# Patient Record
Sex: FEMALE | Race: WHITE | NOT HISPANIC OR LATINO | Employment: FULL TIME | URBAN - METROPOLITAN AREA
[De-identification: names, ages, dates, MRNs, and addresses within clinical notes are randomized per-mention and may not be internally consistent; named-entity substitution may affect disease eponyms.]

---

## 2023-11-27 ENCOUNTER — OFFICE VISIT (OUTPATIENT)
Dept: NEUROLOGY | Facility: CLINIC | Age: 38
End: 2023-11-27
Payer: COMMERCIAL

## 2023-11-27 VITALS
TEMPERATURE: 99.5 F | OXYGEN SATURATION: 97 % | SYSTOLIC BLOOD PRESSURE: 122 MMHG | WEIGHT: 197.1 LBS | HEART RATE: 100 BPM | BODY MASS INDEX: 29.11 KG/M2 | DIASTOLIC BLOOD PRESSURE: 76 MMHG

## 2023-11-27 DIAGNOSIS — Z3A.01 LESS THAN 8 WEEKS GESTATION OF PREGNANCY: ICD-10-CM

## 2023-11-27 DIAGNOSIS — G43.109 MIGRAINE WITH AURA AND WITHOUT STATUS MIGRAINOSUS, NOT INTRACTABLE: Primary | ICD-10-CM

## 2023-11-27 PROCEDURE — 99213 OFFICE O/P EST LOW 20 MIN: CPT

## 2023-11-27 NOTE — PROGRESS NOTES
[facility]  PATIENT:  Diego Vieira  MRN:  [id number]  :  1985  DATE OF SERVICE:  2023      Assessment/Plan:     Migraine with aura and without status migrainosus in pregnancy:    I had the pleasure of seeing Harjinder Wilhelm today in the office at [facility] and by phone. She is presenting for an office visit follow-up in regard to her migraine headaches. Patient had been previously followed by Dr. Mdayson Yañez in regards to treating her migraine headaches. At the last visit, there was some discussion regards to the patient becoming pregnant. Patient reports that at today's visit she is about 7 weeks gestation into her pregnancy. She reports that she had stopped taking all of her medications including naproxen and rizatriptan. She still continues to take a prenatal vitamin that has vitamin B2 in it, she also continues magnesium supplementation as well outside of this. Encouraged the patient that it is perfectly fine to continue with magnesium and B2 supplementation at this time. Patient states however she has only had 2 significant migraines since her last appointment. She notes that the naproxen was helping more so at relieving the headaches and the rizatriptan, however she is not able to take the naproxen now of course during pregnancy. Had advised her that she could just take about a 1000 mg of Tylenol she did have a significant migraine headache and see how that helps to lemonade. If she is having difficulty with her migraines or she has any increase in regard to her migraine frequency in the future, she can certainly reach out and let us know. Would be happy to assist her with any additional abortive or preventative medication if need be. At this time, patient should just continue with her supplementation and using Tylenol if needed and we will see how she does over the course of the next few months.   Plan is to follow-up with Dr. Madyson Yañez in about 6 months time. Patient Instructions:    Headache Calendar  Please maintain a headache calendar  Consider using phone applications such as Migraine Rafael or Migraine Diary    Headache/migraine treatment:     Rescue medications (for immediate treatment of a headache):     - Okay to take Tylenol 1000 mg at the onset of migraine if needed. Can repeat every 6 hours as need be for a migraine headache. - Avoid NSAIDs right now during pregnancy. - Continue current prenatal regimen and following with OB/GYN for pregnancy at this time. Over the counter preventive supplements for headaches/migraines (if you try, try for 3 months straight)  (to take every day to help prevent headaches - not to take at the time of headache): There are combo pills online of these - none of which regulated by FDA and double check dosing - take appropriate dose only once a day- prevent a migraine, migravent, mind ease, migrelief   [x] Magnesium 400mg daily (If any diarrhea or upset stomach, decrease dose  as tolerated)  [x] Riboflavin (Vitamin B2) 400mg daily (may make your urine bright/neon yellow)    Lifestyle Recommendations:  [x] SLEEP - Maintain a regular sleep schedule: Adults need at least 7-8 hours of uninterrupted a night. Maintain good sleep hygiene:  Going to bed and waking up at consistent times, avoiding excessive daytime naps, avoiding caffeinated beverages in the evening, avoid excessive stimulation in the evening and generally using bed primarily for sleeping. One hour before bedtime would recommend turning lights down lower, decreasing your activity (may read quietly, listen to music at a low volume). When you get into bed, should eliminate all technology (no texting, emailing, playing with your phone, iPad or tablet in bed). [x] HYDRATION - Maintain good hydration. Drink  2L of fluid a day (4 typical small water bottles)  [x] DIET - Maintain good nutrition.  In particular don't skip meals and try and eat healthy balanced meals regularly. [x] TRIGGERS - Look for other triggers and avoid them: Limit caffeine to 1-2 cups a day or less. Avoid dietary triggers that you have noticed bring on your headaches (this could include aged cheese, peanuts, MSG, aspartame and nitrates). [x] EXERCISE - physical exercise as we all know is good for you in many ways, and not only is good for your heart, but also is beneficial for your mental health, cognitive health and  chronic pain/headaches. I would encourage at the least 5 days of physical exercise weekly for at least 30 minutes. Education and Follow-up  [x] Please call with any questions or concerns. Of course if any new concerning symptoms go to the emergency department. [x] Follow up with 6 months with Dr. Romie Anderson      History of Present Illness: For Review: We had the pleasure of evaluating Amarjit Mitchell in neurological follow up  today for headaches. As you know,  she is a 30 y.o.   female with a past history of migraine headaches. Patient was last seen in the office at [facility] on 05/31/2020 by Dr. Romie Anderson. When the patient was last seen in the office, noted that she was doing exceptionally well with magnesium and B2 supplementation. Reporting that the frequency and intensity of her headaches had decreased. Patient was either using naproxen or rizatriptan at the time to help eliminate her headaches which seem to be working. It was noted at the last visit that the patient was planning on pregnancy after getting  in September. She had been recommended to provide us with any updates in regards to this in the future. There is recommended if she did become pregnant that she should talk with her OB/GYN about future treatment options if needed. At this time, still recommended to continue with naproxen and rizatriptan management for abortive therapy of her headaches.   Also recommended to continue with magnesium and B2 supplementation as well.        Current medical illnesses: migraines and anxiety       What medications do you take or have you taken for your headaches? Current Preventive:   Mg and B2  Current Abortive:   Maxalt 10 mg,     Prior Preventive:   Aimovig (stopped due to planning for pregnancy), Topamax (did not help), Candesartan (light headed), Gabapentin (worked initially, but then stopped)   Prior Abortive:   Compazine (did not help)     Interval updates as of 11/27/2023:    Still taking magnesium and prenatals have B2 in them. Maxalt did not work the greatest, was using Naproxen which was working but not taking now. Not taking Maxalt either with pregnancy. Recommended for the time being we stop taking Naproxen and Maxalt and just continue with Tylenol for abortive therapy if need be. 7 weeks gestation at this point in time. How often do the headaches occur? 2 migraines in total since last visit   Are you ever headache free? yes    What time of the day do the headaches start? Morning time usually     How long do the headaches last?   Usually a half an hour with naproxen to get rid of the migraine. Describe your usual headache ? Stabbing     Where is your headache located? Either temple on either side with additional neck pain     What is the intensity of pain? Average: 6 or 7/10     Associated symptoms:   Photophobia, phonophobia  Problem with concentration  light-headed or dizzy, stiff or sore neck,   prefer to be alone and in a dark room    Headache history with updates:  Headache are worse if the patient: bending over would make worse     Any positional change headaches? No positional change headaches     Headache triggers: weather changes, lack of sleep, dietary changes, lack of exercise     Aura/warning and how long does it last ? Yes, one aura in the past. Geometric patterns out of her peripheral vision. Was about two years ago when she had one. 15-30 minutes before pain came on.      What time of the year do headaches occur more frequently? are usually worse in the spring    Have you seen someone else for headaches or pain? Yes, had seen many neurologists in [place] and went to [place] in [facility]. Are you current pregnant or planning on getting pregnant? Yes    Have you ever had any Brain imaging? Yes, had been many years ago in the past.     Reviewed old notes from physician seen in the past- see above HPI for summary of previous encounters. Past Medical History:   Diagnosis Date    Cluster headache August 2009    Headache, tension-type August 2005    Migraine August 2009       Patient Active Problem List   Diagnosis    Migraine with aura and without status migrainosus, not intractable       Medications:      Current Outpatient Medications   Medication Sig Dispense Refill    Ascorbic Acid (vitamin C) 100 MG tablet Take 100 mg by mouth daily      Drospirenone 4 MG TABS Take 1 tablet by mouth in the morning (Patient not taking: Reported on 4/12/2023) 28 tablet 11    MAGNESIUM PO Take by mouth      naproxen (NAPROSYN) 500 mg tablet Take 1 tablet (500 mg total) by mouth daily as needed (migraine) 15 tablet 2    Riboflavin (VITAMIN B2 PO) Take by mouth      rizatriptan (MAXALT-MLT) 10 mg disintegrating tablet Take 1 tablet (10 mg total) by mouth once as needed for migraine for up to 1 dose May repeat in 2 hours if needed 10 tablet 3    sertraline (ZOLOFT) 50 mg tablet Take 1.5 tabs po daily for a total of 75 mg daily; avoid regular use of nsaids while taking 135 tablet 0     No current facility-administered medications for this visit.         Allergies:    No Known Allergies    Family History:     Family History   Problem Relation Age of Onset    Migraines Mother     Asthma Mother     Diabetes Mother     Dementia Maternal Grandmother     Migraines Maternal Grandmother     Cancer Father         prostate    Diabetes Father        Social History:     Social History     Socioeconomic History    Marital status: /Civil [employer]     Spouse name: Not on file    Number of children: Not on file    Years of education: Not on file    Highest education level: Not on file   Occupational History    Not on file   Tobacco Use    Smoking status: Never    Smokeless tobacco: Never   Vaping Use    Vaping Use: Never used   Substance and Sexual Activity    Alcohol use: Yes     Alcohol/week: 2.0 standard drinks of alcohol     Types: 2 Cans of beer per week     Comment: socially    Drug use: Yes     Types: Marijuana    Sexual activity: Yes     Partners: Male     Comment: birth control   Other Topics Concern    Not on file   Social History Narrative    Not on file     Social Determinants of Health     Financial Resource Strain: Not on file   Food Insecurity: Not on file   Transportation Needs: Not on file   Physical Activity: Not on file   Stress: Not on file   Social Connections: Not on file   Intimate Partner Violence: Not on file   Housing Stability: Not on file         Objective:     Physical Exam:                                                                 Vitals:            Constitutional:    There were no vitals taken for this visit. BP Readings from Last 3 Encounters:   06/07/23 119/74   05/31/23 116/78   04/12/23 120/84     Pulse Readings from Last 3 Encounters:   06/07/23 86   05/31/23 79   04/12/23 84         Well developed, well nourished, well groomed. No dysmorphic features. Psychiatric:  Normal behavior and appropriate affect        Neurological Examination:     Mental status/cognitive function:   Orientated to time, place and person. Recent and remote memory intact. Attention span and concentration as well as fund of knowledge are appropriate for age. Normal language and spontaneous speech. Cranial Nerves:  II-visual fields full. III, IV, VI-Pupils were equal, round, and reactive to light and accomodation. Extraocular movements were full and conjugate without nystagmus.  Conjugate gaze, normal smooth pursuits, normal saccades   V-facial sensation symmetric. VII-facial expression symmetric, intact forehead wrinkle, strong eye closure, symmetric smile    VIII-hearing grossly intact bilaterally   IX, X-palate elevation symmetric, no dysarthria. XI-shoulder shrug strength intact    XII-tongue protrusion midline. Motor Exam: symmetric bulk and tone throughout, no pronator drift. Power/strength 5/5 bilateral upper and lower extremities, no atrophy, fasciculations or abnormal movements noted. Sensory: grossly intact light touch in all extremities. Reflexes: brachioradialis 2+, biceps 2+, knee 2+ bilaterally  Coordination: Finger nose finger intact bilaterally, no apparent dysmetria, ataxia or tremor noted  Gait: steady casual and tandem gait. Review of Systems:     Review of Systems   Constitutional:  Negative for appetite change, fatigue and fever. HENT: Negative. Negative for hearing loss, tinnitus, trouble swallowing and voice change. Eyes: Negative. Negative for photophobia, pain and visual disturbance. Respiratory: Negative. Negative for shortness of breath. Cardiovascular: Negative. Negative for palpitations. Gastrointestinal: Negative. Negative for nausea and vomiting. Endocrine: Negative. Negative for cold intolerance. Genitourinary: Negative. Negative for dysuria, frequency and urgency. Musculoskeletal:  Negative for back pain, gait problem, myalgias and neck pain. Skin: Negative. Negative for rash. Allergic/Immunologic: Negative. Neurological: Negative. Negative for dizziness, tremors, seizures, syncope, facial asymmetry, speech difficulty, weakness, light-headedness, numbness and headaches. Hematological: Negative. Does not bruise/bleed easily. Psychiatric/Behavioral: Negative. Negative for confusion, hallucinations and sleep disturbance. All other systems reviewed and are negative.       I personally reviewed the ROS entered by the MA    I spent 20 minutes in total time for this visit.     Author:  Tanvir Ortiz PA-C 11/27/2023 7:35 AM

## 2023-11-27 NOTE — PATIENT INSTRUCTIONS
Patient Instructions:    Headache Calendar  Please maintain a headache calendar  Consider using phone applications such as Migraine Rafael or Migraine Diary    Headache/migraine treatment:     Rescue medications (for immediate treatment of a headache):     - Okay to take Tylenol 1000 mg at the onset of migraine if needed. Can repeat every 6 hours as need be for a migraine headache. - Avoid NSAIDs right now during pregnancy. - Continue current prenatal regimen and following with OB/GYN for pregnancy at this time. Over the counter preventive supplements for headaches/migraines (if you try, try for 3 months straight)  (to take every day to help prevent headaches - not to take at the time of headache): There are combo pills online of these - none of which regulated by FDA and double check dosing - take appropriate dose only once a day- prevent a migraine, migravent, mind ease, migrelief   [x] Magnesium 400mg daily (If any diarrhea or upset stomach, decrease dose  as tolerated)  [x] Riboflavin (Vitamin B2) 400mg daily (may make your urine bright/neon yellow)    Lifestyle Recommendations:  [x] SLEEP - Maintain a regular sleep schedule: Adults need at least 7-8 hours of uninterrupted a night. Maintain good sleep hygiene:  Going to bed and waking up at consistent times, avoiding excessive daytime naps, avoiding caffeinated beverages in the evening, avoid excessive stimulation in the evening and generally using bed primarily for sleeping. One hour before bedtime would recommend turning lights down lower, decreasing your activity (may read quietly, listen to music at a low volume). When you get into bed, should eliminate all technology (no texting, emailing, playing with your phone, iPad or tablet in bed). [x] HYDRATION - Maintain good hydration. Drink  2L of fluid a day (4 typical small water bottles)  [x] DIET - Maintain good nutrition.  In particular don't skip meals and try and eat healthy balanced meals regularly. [x] TRIGGERS - Look for other triggers and avoid them: Limit caffeine to 1-2 cups a day or less. Avoid dietary triggers that you have noticed bring on your headaches (this could include aged cheese, peanuts, MSG, aspartame and nitrates). [x] EXERCISE - physical exercise as we all know is good for you in many ways, and not only is good for your heart, but also is beneficial for your mental health, cognitive health and  chronic pain/headaches. I would encourage at the least 5 days of physical exercise weekly for at least 30 minutes. Education and Follow-up  [x] Please call with any questions or concerns. Of course if any new concerning symptoms go to the emergency department.   [x] Follow up with 6 months with Dr. Adia Garcia

## 2023-12-07 ENCOUNTER — ULTRASOUND (OUTPATIENT)
Dept: OBGYN CLINIC | Facility: CLINIC | Age: 38
End: 2023-12-07
Payer: COMMERCIAL

## 2023-12-07 ENCOUNTER — TELEPHONE (OUTPATIENT)
Dept: OBGYN CLINIC | Facility: CLINIC | Age: 38
End: 2023-12-07

## 2023-12-07 DIAGNOSIS — N91.1 SECONDARY AMENORRHEA: ICD-10-CM

## 2023-12-07 DIAGNOSIS — O21.9 NAUSEA/VOMITING IN PREGNANCY: ICD-10-CM

## 2023-12-07 DIAGNOSIS — Z34.90 EARLY STAGE OF PREGNANCY: Primary | ICD-10-CM

## 2023-12-07 PROCEDURE — 76817 TRANSVAGINAL US OBSTETRIC: CPT | Performed by: STUDENT IN AN ORGANIZED HEALTH CARE EDUCATION/TRAINING PROGRAM

## 2023-12-07 PROCEDURE — 99213 OFFICE O/P EST LOW 20 MIN: CPT | Performed by: STUDENT IN AN ORGANIZED HEALTH CARE EDUCATION/TRAINING PROGRAM

## 2023-12-07 NOTE — PROGRESS NOTES
Ultrasound Probe Disinfection    A transvaginal ultrasound was performed. Prior to use, disinfection was performed with High Level Disinfection Process ([employer]). Probe serial number. Probe serial number: [id number]    Nausea/vomiting in pregnancy  -recommend unisom and vit B 6     Early stage of pregnancy  37yo  at 8.5wks by LMP consistent with BSUS today presents for dating US, RAMILA 24    Pt denies pelvic cramping, vaginal bleeding, vaginal discharge.  Endorses occasional nausea and vomiting.     -continue PNVs   -bleeding precautions provided   -return for initial PNV and MFM US     ROS: denies headaches, changes in vision, chest pain, palpitations, shortness of breath, nausea, vomiting, fevers, chills     PE:  General: alert and oriented, resting comfortably, no acute distress  Cardiac: regular rate  Pulmonary: no respiratory distress  Abdomen: soft, nondistended, no rebound or guarding, nontender   : external vulva without lesions, redness, tenderness   Extremities: no edema or calf tenderness bilaterally

## 2023-12-07 NOTE — ASSESSMENT & PLAN NOTE
39yo  at 8.5wks by LMP consistent with BSUS today presents for dating US, RAMILA 24    Pt denies pelvic cramping, vaginal bleeding, vaginal discharge.  Endorses occasional nausea and vomiting.     -continue PNVs   -bleeding precautions provided   -return for initial PNV and MFM US

## 2023-12-11 ENCOUNTER — TELEPHONE (OUTPATIENT)
Facility: HOSPITAL | Age: 38
End: 2023-12-11

## 2023-12-11 NOTE — TELEPHONE ENCOUNTER
Called patient to schedule MFM appointment, based on referral issued to Maternal Fetal Medicine by [facility] office. Spoke w/PT and she stated she would like to hold off on scheduling for now. She is in the middle of switching OB's and wants to establish care before scheduling. PT verbalized understanding that she can call back to schedule at any time when she establishes care.

## 2023-12-14 ENCOUNTER — INITIAL PRENATAL (OUTPATIENT)
Dept: OBGYN CLINIC | Facility: CLINIC | Age: 38
End: 2023-12-14

## 2023-12-14 VITALS
RESPIRATION RATE: 18 BRPM | BODY MASS INDEX: 28.82 KG/M2 | HEIGHT: 69 IN | OXYGEN SATURATION: 99 % | SYSTOLIC BLOOD PRESSURE: 122 MMHG | DIASTOLIC BLOOD PRESSURE: 78 MMHG | HEART RATE: 84 BPM | WEIGHT: 194.6 LBS

## 2023-12-14 DIAGNOSIS — Z34.91 NORMAL PREGNANCY IN FIRST TRIMESTER: ICD-10-CM

## 2023-12-14 DIAGNOSIS — Z3A.09 9 WEEKS GESTATION OF PREGNANCY: Primary | ICD-10-CM

## 2023-12-14 PROCEDURE — NOBC

## 2023-12-14 NOTE — PROGRESS NOTES
Manoj Turner presents for OB intake interview. She is a pleasant 38yr old . She is currently taking Zoloft 50mg daily. She offers no concerns during today's visit. Patient oriented to practice, different practice providers, different practice locations. Reviewed expected prenatal visit schedule. Reviewed date and time of next scheduled appointment. PLAN-  -Prenatal labs ordered  -Referral given for MFM  -Reviewed Genetic Testing options  -Patient to contact us with any concerns  -RTO for OB visit with pap and cultures      OB History          2    Para        Term                AB   1    Living             SAB        IAB   1    Ectopic        Multiple        Live Births               Obstetric Comments   Menarche 17               Hx of  delivery prior to 36 weeks 6 days: No     Last Menstrual Period: Patient's last menstrual period was 10/07/2023. Confirmation ultrasound done on 2023: 8w5d       Estimated Date of Delivery: 2024                Signs/Symptoms of Pregnancy                            Breast tenderness:  YES              Fatigue:   YES              Cramping:   YES              Nausea:  YES  Vomiting:   YES     Pregravid BMI: Body mass index is 28.78  Discussed appropriate weight gain in pregnancy based on pre-gravid BMI.      Diabetes              Pregestational DM:  NO              hx of GDM: NO              BMI >35: NO              first degree relative with type 2 diabetes: YES              hx of PCOS: NO              current metformin use: NO               prior hx of LGA/macrosomia: NO                   Hypertension              Hx of chronic HTN: NO              hx of gestational HTN: NO              hx of preeclampsia, eclampsia, or HELLP syndrome: NO              Family h/o preeclampsia: NO              Age 28 or older: YES              Multifetal gestation: NO  Type 1 or Type 2 DM: NO  Renal Disease: NO  Autoimmune disease (systemic lupus erythematosus, antiphospholipid antibody syndrome): NO  Nulliparity: YES  Obesity (BMI over 30): NO  More than 10 year pregnancy interval: NO  Previous IUGR, low birthweight or small for gestational age:NO        Infection Screening              Does the pt have a hx of MRSA? NO              Recent travel outside of [place]?  NO       Immunizations:              Influenza vaccine given today: NO (vaccine given 10/10/23)              Discussed Tdap vaccine administration at 27-28 weeks   COVID vaccine discussed   RSV vaccine discussed     Interview education              [facility] Pregnancy Essentials Book reviewed and discussed                          Handouts given                          Nutrition During Pregnancy  Prenatal Genetic Screening Tests                          Immunization & Pregnancy                          Medications & Pregnancy                          Warning Signs During Pregnancy                          Baby and Me support center                          [facility] Childbirth and Parenting Classes  [address] in Pregnancy    Dental visit with last 6 months - YES  PHQ-2/9 score: 0         MyChart activated (not 1518 years of age)?: YES

## 2023-12-15 ENCOUNTER — APPOINTMENT (OUTPATIENT)
Dept: LAB | Facility: CLINIC | Age: 38
End: 2023-12-15
Payer: COMMERCIAL

## 2023-12-15 DIAGNOSIS — Z34.91 NORMAL PREGNANCY IN FIRST TRIMESTER: ICD-10-CM

## 2023-12-15 DIAGNOSIS — Z3A.09 9 WEEKS GESTATION OF PREGNANCY: ICD-10-CM

## 2023-12-15 LAB
ABO GROUP BLD: NORMAL
BASOPHILS # BLD AUTO: 0.02 THOUSANDS/ÂΜL (ref 0–0.1)
BASOPHILS NFR BLD AUTO: 0 % (ref 0–1)
BLD GP AB SCN SERPL QL: NEGATIVE
EOSINOPHIL # BLD AUTO: 0.16 THOUSAND/ÂΜL (ref 0–0.61)
EOSINOPHIL NFR BLD AUTO: 2 % (ref 0–6)
ERYTHROCYTE [DISTWIDTH] IN BLOOD BY AUTOMATED COUNT: 11.6 % (ref 11.6–15.1)
HBV SURFACE AG SER QL: NORMAL
HCT VFR BLD AUTO: 39.9 % (ref 34.8–46.1)
HCV AB SER QL: NORMAL
HGB BLD-MCNC: 13.6 G/DL (ref 11.5–15.4)
HIV 1+2 AB+HIV1 P24 AG SERPL QL IA: NORMAL
HIV 2 AB SERPL QL IA: NORMAL
HIV1 AB SERPL QL IA: NORMAL
HIV1 P24 AG SERPL QL IA: NORMAL
IMM GRANULOCYTES # BLD AUTO: 0.02 THOUSAND/UL (ref 0–0.2)
IMM GRANULOCYTES NFR BLD AUTO: 0 % (ref 0–2)
LYMPHOCYTES # BLD AUTO: 1.6 THOUSANDS/ÂΜL (ref 0.6–4.47)
LYMPHOCYTES NFR BLD AUTO: 23 % (ref 14–44)
MCH RBC QN AUTO: 30.7 PG (ref 26.8–34.3)
MCHC RBC AUTO-ENTMCNC: 34.1 G/DL (ref 31.4–37.4)
MCV RBC AUTO: 90 FL (ref 82–98)
MONOCYTES # BLD AUTO: 0.55 THOUSAND/ÂΜL (ref 0.17–1.22)
MONOCYTES NFR BLD AUTO: 8 % (ref 4–12)
NEUTROPHILS # BLD AUTO: 4.71 THOUSANDS/ÂΜL (ref 1.85–7.62)
NEUTS SEG NFR BLD AUTO: 67 % (ref 43–75)
NRBC BLD AUTO-RTO: 0 /100 WBCS
PLATELET # BLD AUTO: 289 THOUSANDS/UL (ref 149–390)
PMV BLD AUTO: 9.1 FL (ref 8.9–12.7)
RBC # BLD AUTO: 4.43 MILLION/UL (ref 3.81–5.12)
RH BLD: POSITIVE
RUBV IGG SERPL IA-ACNC: <10 IU/ML
SPECIMEN EXPIRATION DATE: NORMAL
TREPONEMA PALLIDUM IGG+IGM AB [PRESENCE] IN SERUM OR PLASMA BY IMMUNOASSAY: NORMAL
VZV IGG SER QL IA: ABNORMAL
WBC # BLD AUTO: 7.06 THOUSAND/UL (ref 4.31–10.16)

## 2023-12-15 PROCEDURE — 87086 URINE CULTURE/COLONY COUNT: CPT

## 2023-12-15 PROCEDURE — 85025 COMPLETE CBC W/AUTO DIFF WBC: CPT

## 2023-12-15 PROCEDURE — 87340 HEPATITIS B SURFACE AG IA: CPT

## 2023-12-15 PROCEDURE — 86901 BLOOD TYPING SEROLOGIC RH(D): CPT

## 2023-12-15 PROCEDURE — 86803 HEPATITIS C AB TEST: CPT

## 2023-12-15 PROCEDURE — 87389 HIV-1 AG W/HIV-1&-2 AB AG IA: CPT

## 2023-12-15 PROCEDURE — 36415 COLL VENOUS BLD VENIPUNCTURE: CPT

## 2023-12-15 PROCEDURE — 86762 RUBELLA ANTIBODY: CPT

## 2023-12-15 PROCEDURE — 86787 VARICELLA-ZOSTER ANTIBODY: CPT

## 2023-12-15 PROCEDURE — 86850 RBC ANTIBODY SCREEN: CPT

## 2023-12-15 PROCEDURE — 86780 TREPONEMA PALLIDUM: CPT

## 2023-12-15 PROCEDURE — 86900 BLOOD TYPING SEROLOGIC ABO: CPT

## 2023-12-17 LAB — BACTERIA UR CULT: NORMAL

## 2023-12-18 NOTE — RESULT ENCOUNTER NOTE
Overall your prenatal labs look very normal except you are not immune to chickenpox or rubella.  You should have a booster to both of these after you delivery

## 2023-12-20 ENCOUNTER — TELEMEDICINE (OUTPATIENT)
Dept: PERINATAL CARE | Facility: CLINIC | Age: 38
End: 2023-12-20

## 2023-12-20 DIAGNOSIS — Z31.430 ENCOUNTER OF FEMALE FOR TESTING FOR GENETIC DISEASE CARRIER STATUS FOR PROCREATIVE MANAGEMENT: ICD-10-CM

## 2023-12-20 DIAGNOSIS — Z31.5 ENCOUNTER FOR PROCREATIVE GENETIC COUNSELING: ICD-10-CM

## 2023-12-20 DIAGNOSIS — O09.521 MULTIGRAVIDA OF ADVANCED MATERNAL AGE IN FIRST TRIMESTER: Primary | ICD-10-CM

## 2023-12-20 PROCEDURE — NC001 PR NO CHARGE: Performed by: GENETIC COUNSELOR, MS

## 2023-12-20 NOTE — PROGRESS NOTES
Genetic Counseling   High-Risk Gestation Note    Appointment Date:  2023  Referred By: Irasema Nuñez*  YOB: 1985  Partner:  Venancio Johnston  Indication for Visit:  advanced maternal age and ethnicity  Pregnancy History:   Estimated Date of Delivery: 24  Estimated Gestational Age: 10w4d    Virtual Regular Visit    Verification of patient location:    Patient is located at Home in the following state in which I hold an active license [place]      Assessment/Plan:    Problem List Items Addressed This Visit    None  Visit Diagnoses       Multigravida of advanced maternal age in first trimester    -  Primary    Encounter of female for testing for genetic disease carrier status for procreative management        Encounter for procreative genetic counseling                     Reason for visit is   Chief Complaint   Patient presents with    Virtual Regular Visit          Encounter provider Kathy Wilson    Provider located at [address] [address]  [phone]      Recent Visits  No visits were found meeting these conditions.  Showing recent visits within past 7 days and meeting all other requirements  Today's Visits  Date Type Provider Dept   23 Telemedicine Kathy Wilson [facility]   Showing today's visits and meeting all other requirements  Future Appointments  No visits were found meeting these conditions.  Showing future appointments within next 150 days and meeting all other requirements       The patient was identified by name and date of birth. Priya Johnston was informed that this is a telemedicine visit and that the visit is being conducted through the Epic Embedded platform. She agrees to proceed..  My office door was closed. No one else was in the room.  She acknowledged consent and understanding of privacy and security of the video platform. The patient has agreed to participate and  understands they can discontinue the visit at any time.      Sylvia Powers is a 38 y.o. female who presented with her partner for genetic counseling to discuss age related risks for chromosome abnormalities.    The risk of Down syndrome at age 38 at delivery is 1/308, and the risk for any chromosomal abnormality at this age is 1/149.  The differences between full chromosome aneuploidies and copy number variants (microdeletions and microduplications) was also discussed.  We reviewed that copy number variants occur in about 0.4% of all pregnancies.  Therefore, for patients under age 36 the risk for copy number variants is higher than the risk for Down syndrome.      The risks, benefits, and limitations of amniocentesis were discussed with the patient.  Amniocentesis is performed starting at 16 weeks gestation, using direct real time ultrasound visualization to avoid both the fetus and the placenta.  Once amniotic fluid is withdrawn, laboratory analysis is performed and amniotic fluid alpha-fetoprotein, as well as chromosome and/or microarray analysis is undertaken.  The risk of genetic amniocentesis includes, but is not limited to less than 1 in 300 pregnancy loss rate or  delivery rate if 23 weeks or greater, infection, bleeding, rupture of membranes, failure of cells to grow, karyotype error, laboratory error, etc.  Occasionally a repeat amniocentesis is necessary due to cell culture failure.  Chromosome/microarray analysis from amniocentesis is 99.9% accurate and alpha-fetoprotein analysis can detect approximately 95% of open neural tube defects.    Chorionic villus sampling (CVS) is another diagnostic testing option that is available earlier than amniocentesis, between 10-14 weeks gestation.  Like amniocentesis, CVS is 99% accurate for detecting chromosomal problems.  Unlike amniocentesis, CVS cannot detect alpha-fetoprotein levels in order to determine the risk for open neural tube defects.  [facility]  testing would need to be performed at 15-20 weeks gestation for this purpose.  The risk of CVS includes, but is not limited to, less than a 1 in 300 risk for pregnancy loss.  There is also a 1% risk for maternal cell contamination and cell culture failure, in which case the CVS would need to be followed-up with amniocentesis.    We reviewed the testing option of cell free fetal DNA screening (also known as noninvasive prenatal testing or NIPT).  We discussed that it is a serum test to identify fragments of placental DNA in maternal blood.  We reviewed the benefits and limitations of cell free fetal DNA screening in detecting Down syndrome, Trisomy 13, Trisomy 18 and sex chromosome aneuploidies.  We also discussed that cell free fetal DNA screening does not detect additional chromosomal abnormalities and the possibility of a failed test result.  As cell free fetal DNA screening does not detect open neural tube defects, MSAFP screening is available at 15-20 weeks gestation.    Sequential screening consists of first trimester measurement of nuchal translucency combined with first trimester biochemical analysis, as well as second trimester biochemical analysis.  It is able to detect approximately 95% of pregnancies in which the fetus has Down syndrome, 90% of pregnancies in which the fetus has trisomy 18 and 80% of pregnancies which the fetus has an open neural tube defect.  It can also indirectly identify other chromosomal abnormalities, copy number variants, genetic syndromes, or adverse pregnancy outcomes if serum analyte levels are abnormal.    We discussed the availability of an ultrasound between 11-14 weeks gestation to measure the nuchal translucency (NT), which can assess for chromosome abnormalities, cardiac defects, and other adverse pregnancy outcomes.  We reviewed that level II anatomy ultrasound is typically performed at approximately 20 weeks gestation.  Level II ultrasound evaluation is between 60-80%  accurate in detecting major physical birth defects and variations in fetal development that may be associated with chromosome/genetic abnormalities.  Level II ultrasound evaluation is not able to detect all birth defects or health problems.    After discussing the available prenatal screening and testing options Priya elected to pursue cell free fetal DNA screening.  A Nurse visit for an [facility] blood draw was scheduled for 12/21/23 at our [facility] location. Results take approximately 7-10 days.  She was informed that the results will disclose the fetal sex and will be available in her MyChart to review.  The patient declined CVS and amniocentesis secondary to procedural related complications.  She may reconsider diagnostic testing should the cell free fetal DNA screening come back abnormal.  Priya is also planning on pursuing NT ultrasound, MSAFP screening and Level II ultrasound at the appropriate times.    Histories for the patient and her partner's family were taken during our session and was noncontributory.  The family history was not significant for genetic diseases or disorders, intellectual disability, birth defects, fetal loss, or consanguinity.    Patient reports being of [place] descent and that her  is of Ashkenazi Jew ([place]) descent.  We reviewed that certain diseases are more common in individuals of Ashkenazi Jew descent and therefore couples are at an increased risk of offspring having one of these conditions.  Many of these disorders are lethal in childhood or carry a significant risk for morbidity and mortality.  These diseases are inherited in an autosomal recessive pattern, thus both parents must carry a gene mutation in order for there to be a risk for the pregnancy to be affected.  Carrier screening based on Ashkenazi Jew ethnic background was offered to Venancio and Priya.  The benefits and limitations of ACOG standard screening for Cystic  fibrosis (CF), Spinal muscular atrophy (SMA), and hemoglobinopathy was discussed.  We also reviewed the option of expanded carrier screening.  We discussed that the panels can test for carrier status for over 500 autosomal recessive and X-linked diseases (including the recommended Mandaen conditions). All of the diseases included on the panel are life threatening, life limiting, or have treatments available. The couple were unsure of which carrier screening they would pursue and elected to further consider the options, sent via [employer].  Should they decide to have any of the testing performed they will contact me directly.    Lastly, we discussed the fact that everyone in the general population regardless of age, family history, or medical background has approximately a 3-5% risk of having a child with some type of congenital anomaly, genetic disease or intellectual disability. Currently there are no tests available to rule out all birth defects or health problems.    Priya was provided with our contact information.  I encouraged her to call with any questions or concerns.    Plan/Tests Ordered:  1) Patient declined CVS, amniocentesis, and Sequential screen.  2) Patient elected NIPS - [facility] blood draw scheduled for 12/21/23 at our [facility] location.  3) Patient and partner considering carrier screening options - will reach out to [facility] if interested in testing.  4) NT ultrasound scheduled for 1/8/24.  5) MSAFP screening at 15-20 weeks gestation - to be ordered by patient OB office.  6) Level II anatomy ultrasound at approximately 20 weeks gestation.      HPI     Past Medical History:   Diagnosis Date    Cluster headache August 2009    Headache, tension-type August 2005    Migraine August 2009    Varicella     disease       Past Surgical History:   Procedure Laterality Date    WISDOM TOOTH EXTRACTION N/A 2007       Current Outpatient Medications   Medication Sig Dispense Refill    MAGNESIUM PO Take by mouth       [id number] (PRENA1 PO) Take by mouth      sertraline (ZOLOFT) 50 mg tablet Take 1.5 tabs po daily for a total of 75 mg daily; avoid regular use of nsaids while taking 135 tablet 0     No current facility-administered medications for this visit.        No Known Allergies    Review of Systems    Video Exam    There were no vitals filed for this visit.    Physical Exam     Visit Time  Total Visit Duration: 60 minutes

## 2023-12-21 ENCOUNTER — TELEPHONE (OUTPATIENT)
Dept: OBGYN CLINIC | Facility: CLINIC | Age: 38
End: 2023-12-21

## 2023-12-21 ENCOUNTER — CLINICAL SUPPORT (OUTPATIENT)
Facility: HOSPITAL | Age: 38
End: 2023-12-21

## 2023-12-21 DIAGNOSIS — O09.511 SUPERVISION OF ELDERLY PRIMIGRAVIDA IN FIRST TRIMESTER: Primary | ICD-10-CM

## 2023-12-21 NOTE — TELEPHONE ENCOUNTER
Patient called, stating that she feels like she isn't emptying her bladder fully, like she has to constantly urinate. She said that it doesn't feel like she has a UTI. Aware that anatomically, her bladder is directly under her uterus and that as her uterus is growing with the baby, it can put extra pressure on her. Recommended to do the cat / cow to help alleviate any pelvic pressure. Recommended to send for a urine culture and pelvic floor PT but patient declined on them at this time. Patient is agreeable to call back if her symptoms do not improve and will consider the other recommended interventions at that time.

## 2023-12-21 NOTE — PROGRESS NOTES
"Attempted to draw pts Inv[facility]ae blood kit.  Unsuccessful attempt x1 in pts right AC.  PT states she has a history of passing out and was not feeling well.  Blood draw attempt discontinued and pt was monitored in a recliner until pt felt better.  Pt provided Kit, with test form and fed ex  to have specimen drawn at out pt lab.  Pt states she may attempt to go today.      Patient chose to have [facility]ae Non-invasive Prenatal Screen with fetal sex.   Patient choose billed through insurance.   Patient assistance program (PAP) application provided to patient no.  PAP sent with specimen No.     Patient given brochure and is aware [employer] will contact patients insurance and coordinate coverage.  Patient made aware she will receive a text message and e-mail from [employer].   Patient informed text message will come from area code  \"415\".   Provided [employer] Client Services # [phone] and web site at [email or web address].    Blood collection tubes labeled with patient identifiers (name, date of birth).     Printed [facility]ae lab order and test kit given with FED EX packaging (Tracking # [id number]). Patient instructed to take to a [facility] outpatient lab for blood collection.  Requested patient notify M (via phone call or [employer] message) when blood collected so office can follow up on results.     Copy of lab order scanned to Epic media.    Maternal Fetal Medicine will have results in approximately 7-10 business days and will call patient or notify via [employer] [address].  Patient aware viewing lab result online will reveal fetal sex If ordered.    Patient verbalized understanding of all instructions and no questions at this time.          "

## 2023-12-22 ENCOUNTER — APPOINTMENT (OUTPATIENT)
Dept: LAB | Facility: CLINIC | Age: 38
End: 2023-12-22
Payer: COMMERCIAL

## 2023-12-22 ENCOUNTER — TRANSCRIBE ORDERS (OUTPATIENT)
Dept: LAB | Facility: CLINIC | Age: 38
End: 2023-12-22

## 2023-12-22 DIAGNOSIS — O09.511 SUPERVISION OF ELDERLY PRIMIGRAVIDA IN FIRST TRIMESTER: ICD-10-CM

## 2023-12-22 DIAGNOSIS — O09.511 SUPERVISION OF ELDERLY PRIMIGRAVIDA IN FIRST TRIMESTER: Primary | ICD-10-CM

## 2023-12-22 PROCEDURE — 36415 COLL VENOUS BLD VENIPUNCTURE: CPT

## 2023-12-23 ENCOUNTER — TELEPHONE (OUTPATIENT)
Dept: OTHER | Facility: OTHER | Age: 38
End: 2023-12-23

## 2023-12-23 NOTE — TELEPHONE ENCOUNTER
Pt wanted to make the office aware that she is in the ER in [place]. She said she was unable to urinate and they can't figure out why.

## 2023-12-24 DIAGNOSIS — F41.8 DEPRESSION WITH ANXIETY: ICD-10-CM

## 2023-12-26 NOTE — TELEPHONE ENCOUNTER
Patient states her ob/gyn is aware of the prescription and dosage and states it would be worse to discontinue rather than take the medication. Pt is unsure if you need a statement from OB in writing? Please advise.

## 2023-12-26 NOTE — TELEPHONE ENCOUNTER
Please call pt  It looks like she is pregnant  She is asking for a refill on her zoloft  Please ask her to discuss refill with her ob/gyn dr at this time  Thanks.

## 2023-12-29 ENCOUNTER — ROUTINE PRENATAL (OUTPATIENT)
Dept: OBGYN CLINIC | Facility: CLINIC | Age: 38
End: 2023-12-29

## 2023-12-29 VITALS — WEIGHT: 192.2 LBS | DIASTOLIC BLOOD PRESSURE: 70 MMHG | BODY MASS INDEX: 28.38 KG/M2 | SYSTOLIC BLOOD PRESSURE: 122 MMHG

## 2023-12-29 DIAGNOSIS — Z3A.11 11 WEEKS GESTATION OF PREGNANCY: Primary | ICD-10-CM

## 2023-12-29 DIAGNOSIS — R33.9 URINARY RETENTION: ICD-10-CM

## 2023-12-29 DIAGNOSIS — O34.531: ICD-10-CM

## 2023-12-29 DIAGNOSIS — O09.521 MULTIGRAVIDA OF ADVANCED MATERNAL AGE IN FIRST TRIMESTER: ICD-10-CM

## 2023-12-29 PROCEDURE — PNV: Performed by: OBSTETRICS & GYNECOLOGY

## 2023-12-29 NOTE — PROGRESS NOTES
38 y.o.  female at 11w6d (Estimated Date of Delivery: 24) for PNV.    Pre-Shilo Vitals      Flowsheet Row Most Recent Value   Prenatal Assessment    Fetal Heart Rate 160   Movement Absent   Prenatal Vitals    Blood Pressure 122/70   Weight - Scale 87.2 kg (192 lb 3.2 oz)   Urine Albumin/Glucose    Dilation/Effacement/Station    Cervical Dilation 0   Vaginal Drainage    Edema           TWG: -1.27 kg (-2 lb 12.8 oz)    Leakage of fluid: no  Vaginal bleeding: no  Contractions/Cramping: no  Fetal movement: no      Patient being seen for f/u due to urinary retention. She was out of town and had to present to ED due to inability to empty her bladder. She was sent home with a catheter that was removed Tuesday. She is able to urinate, but does not feel she empties her bladder completely. She was told in the past that she has a retroflexed uterus. We discussed uterine incarceration. She does not have much pain outside of when she can't urinate. She does report pressure in vaginal area. On exam, the uterus feels retroflexed but mobile. Will monitor closely. Consider pelvic floor PT (will send patient information, occurred to me after she left that she may benefit).  If symptoms recur, patient to call office. Discussed self cath at home earlier this week, patient would not be comfortable with this.     She has NIPT pending. She has NT scan scheduled.     RTO in 2 weeks.

## 2023-12-29 NOTE — PATIENT INSTRUCTIONS
Cory Powers,     After you left, I thought that you might benefit from seeing our pelvic floor team. Here is there information, I will place a consult order.     [email or web address]    Physical Therapy at [facility] team of trained female therapists offer a wide variety of services designed to address the special healthcare needs of women. Our specially trained clinicians work with you to address your concerns and come beside you to support and encourage you through the healing process. Our offices are designed to keep your sensitive treatments private at all times. We are here to ensure your comfort and mentor you through our pelvic floor therapy programs at your pace.    Our female team of experts treat the following conditions faced by women:      Urinary or bowel incontinence  Urinary urgency or frequency  Painful Bonneau  Painful Bladder Syndrome  Overactive Bladder  Constipation  Pelvic Girdle Pain             Sacroiliac Dysfunction   Gynecological Cancers    Pregnancy & Postpartum related discomfort  Buttock/Tailbone Pain                       Lumbar/Thoracic Pain  Hip Abnormalities/Pain    Scar Adhesions  Diastasis Recti  Osteoporosis          /Episiotomy Recovery  Vulvodynia  Pelvic Pain

## 2024-01-05 NOTE — PATIENT INSTRUCTIONS
Thank you for choosing us for your  care today.  If you have any questions about your ultrasound or care, please do not hesitate to contact us or your primary obstetrician.        Some general instructions for your pregnancy are:    Exercise: Aim for 22 minutes per day (150 minutes per week) of regular exercise.  Walking is great!  Nutrition: Choose healthy sources of calcium, iron, and protein.  Learn about Preeclampsia: preeclampsia is a common, serious high blood pressure complication in pregnancy.  A blood pressure of 140mmHg (systolic or top number) or 90mmHg (diastolic or bottom number) is not normal and needs evaluation by your doctor.  Aspirin is sometimes prescribed in early pregnancy to prevent preeclampsia in women with risk factors - ask your obstetrician if you should be on this medication.  For more resources, visit:  https://www.highriskpregnancyinfo.org/preeclampsia  Consider the RSV vaccine (Abrysvo) between 32 weeks 0 days and 36 weeks 6 days to protect your baby.  If you smoke, try to reduce how many cigarettes you smoke or try to quit completely.  Do not vape.    Other warning signs to watch out for in pregnancy or postpartum: chest pain, obstructed breathing or shortness of breath, seizures, thoughts of hurting yourself or your baby, bleeding, a painful or swollen leg, fever, or headache (see [facility] POST-[facility] Warning Signs campaign).  If these happen call 911.  Itching is also not normal in pregnancy and if you experience this, especially over your hands and feet, potentially worse at night, notify your doctors.    Low dose aspirin, when started early in pregnancy, can reduce your risk of (prevent) preeclampsia.  General dose recommendation is 81mg or 162mg daily.  Side effects are generally none to mild, however, if you experience what you think may be an allergic reaction after starting aspirin, immediately stop it and notify your doctor.  If you have asthma or acid reflux and notice  an increase in symptom frequency or severity, consider stopping this medication, and notify your doctor.  If you have had bariatric surgery, you may need a different dose of medication with or without acid-reducing medication.  We advise routine discontinuation of this medication at 36 weeks.  For more resources, visit:  https://[email or web address]/fact-sheets/low-dose-aspirin/  https://www.preeclampsia.org/aspirin  https://www.highriskpregnancyinfo.org/preeclampsia

## 2024-01-08 ENCOUNTER — ROUTINE PRENATAL (OUTPATIENT)
Facility: HOSPITAL | Age: 39
End: 2024-01-08
Attending: OBSTETRICS & GYNECOLOGY
Payer: COMMERCIAL

## 2024-01-08 VITALS
WEIGHT: 190.2 LBS | HEIGHT: 69 IN | BODY MASS INDEX: 28.17 KG/M2 | DIASTOLIC BLOOD PRESSURE: 76 MMHG | HEART RATE: 118 BPM | SYSTOLIC BLOOD PRESSURE: 120 MMHG

## 2024-01-08 DIAGNOSIS — O09.521 MULTIGRAVIDA OF ADVANCED MATERNAL AGE IN FIRST TRIMESTER: ICD-10-CM

## 2024-01-08 DIAGNOSIS — Z3A.09 9 WEEKS GESTATION OF PREGNANCY: ICD-10-CM

## 2024-01-08 DIAGNOSIS — Z3A.13 13 WEEKS GESTATION OF PREGNANCY: ICD-10-CM

## 2024-01-08 DIAGNOSIS — Z36.82 ENCOUNTER FOR ANTENATAL SCREENING FOR NUCHAL TRANSLUCENCY: Primary | ICD-10-CM

## 2024-01-08 DIAGNOSIS — Z34.91 NORMAL PREGNANCY IN FIRST TRIMESTER: ICD-10-CM

## 2024-01-08 DIAGNOSIS — O34.511 INCARCERATED GRAVID UTERUS IN FIRST TRIMESTER: ICD-10-CM

## 2024-01-08 PROCEDURE — 76813 OB US NUCHAL MEAS 1 GEST: CPT | Performed by: OBSTETRICS & GYNECOLOGY

## 2024-01-08 PROCEDURE — 99203 OFFICE O/P NEW LOW 30 MIN: CPT | Performed by: OBSTETRICS & GYNECOLOGY

## 2024-01-08 PROCEDURE — 76801 OB US < 14 WKS SINGLE FETUS: CPT | Performed by: OBSTETRICS & GYNECOLOGY

## 2024-01-08 NOTE — LETTER
"   Date: 2024    Irasema Nuñez MD   [facility] [address]    Patient: Priya Johnston   YOB: 1985   Date of Visit: 2024   Gestational age 12w6d   Nature of this communication: Routine though please note she no longer has inability to void       Dear Irasema,    This patient was seen recently in our  office.  Please see ultrasound report under \"OB Procedures\" tab.  Please don't hesitate to contact our office with any concerns or questions.      Sincerely,      Vita Neumann MD  Attending Physician, Maternal-Fetal Medicine  [facility]      "

## 2024-01-08 NOTE — PROGRESS NOTES
"[facility]: Ms. Johnston was seen today for nuchal translucency ultrasound.  See ultrasound report under \"OB Procedures\" tab.      MDM:   I. Diagnoses/Problems addressed:  Acute uncomplicated illness/injury: possible history of uterine incarceration (ex. GDMA1, MO, UTI)  II.  Data: I reviewed NIPS lab tests ordered by another provider.  III.  Risk of morbidity: Moderate (Rx management)    Please don't hesitate to contact our office with any concerns or questions.  -Vita Neumann MD    "

## 2024-01-10 ENCOUNTER — ROUTINE PRENATAL (OUTPATIENT)
Dept: OBGYN CLINIC | Facility: CLINIC | Age: 39
End: 2024-01-10

## 2024-01-10 VITALS — DIASTOLIC BLOOD PRESSURE: 80 MMHG | SYSTOLIC BLOOD PRESSURE: 120 MMHG | WEIGHT: 191.8 LBS | BODY MASS INDEX: 28.32 KG/M2

## 2024-01-10 DIAGNOSIS — O09.521 MULTIGRAVIDA OF ADVANCED MATERNAL AGE IN FIRST TRIMESTER: Primary | ICD-10-CM

## 2024-01-10 DIAGNOSIS — O34.511 INCARCERATED GRAVID UTERUS IN FIRST TRIMESTER: ICD-10-CM

## 2024-01-10 DIAGNOSIS — Z3A.13 13 WEEKS GESTATION OF PREGNANCY: ICD-10-CM

## 2024-01-10 PROCEDURE — 87491 CHLMYD TRACH DNA AMP PROBE: CPT | Performed by: OBSTETRICS & GYNECOLOGY

## 2024-01-10 PROCEDURE — G0145 SCR C/V CYTO,THINLAYER,RESCR: HCPCS | Performed by: OBSTETRICS & GYNECOLOGY

## 2024-01-10 PROCEDURE — G0476 HPV COMBO ASSAY CA SCREEN: HCPCS | Performed by: OBSTETRICS & GYNECOLOGY

## 2024-01-10 PROCEDURE — PNV: Performed by: OBSTETRICS & GYNECOLOGY

## 2024-01-10 PROCEDURE — 87591 N.GONORRHOEAE DNA AMP PROB: CPT | Performed by: OBSTETRICS & GYNECOLOGY

## 2024-01-10 NOTE — PROGRESS NOTES
Routine prenatal 13 weeks, pap and gc due. Pt would like clarification on uterine incarceration and pelvic floor pt and labs (rubella and varicella). Denies vb,lof,and ctx.

## 2024-01-10 NOTE — TELEPHONE ENCOUNTER
PLEASE CALL PT  I HAVE SENT IN REFILL ON HER ZOLOFT - AS OB/GYN  STATED OK TO CONTINUE WITH MED THRU PREGNANCY (SEE BELOW)  CONFIRM THAT PT IS STILL TAKING 75 MG DAILY (BY TAKING 1 1/2 OF THE 50 MG TABS)  AND THEN ASK HER TO SCHEDULE A FOLLOWUP VISIT WITH ME WITHIN THE NEXT MONTH  AS I LAST SAW HER IN 5/2023 AND ASKED HER TO RETURN IN 6 MOS  I WOULD LIKE TO ASSESS EFFICACY OF ZOLOFT IN PREGNANCY AS WELL AS ANY OTHER POTENTIAL CONCERNS.      I received a staff note from pt's ob/gyn that pt can safely continue zoloft thru her pregnancy    I have cc and pasted:    Leigh Estrella, DO Carole Jiménez, Priya Lorenzo can and should continue her Zoloft through pregnancy. If you are not comfortable filling it, I am happy to send it in for her. Zoloft is the ssri with the most information/studies related to pregnancy and if patient's are stable on it prior to pregnancy, we will continue it.    Thanks,  Leigh Estrella

## 2024-01-10 NOTE — PROGRESS NOTES
38 y.o.  female at 13w4d (Estimated Date of Delivery: 24) for PNV.    Pre-Shilo Vitals      Flowsheet Row Most Recent Value   Prenatal Assessment    Fetal Heart Rate 160   Movement Absent   Prenatal Vitals    Blood Pressure 120/80   Weight - Scale 87 kg (191 lb 12.8 oz)   Urine Albumin/Glucose    Dilation/Effacement/Station    Vaginal Drainage    Edema           TWG: -1.452 kg (-3 lb 3.2 oz)    Cramping: no  Bleeding: no  LOF: no  NT/13 week scan scheduled: yes  AFP ordered if indicated: yes  Prenatal labs complete (including Heb B, HIV): yes; date completed 12/15/2023  Flu vaccine up to date: yes  Covid vaccine up to date: yes    Pap collected: yes  GC collected:yes  Pelvis feels adequate for SHONDA: yes  Weight gain discussed. Exercise encouraged.   Oriented to practice/delivery location.     Discussed her episode of urinary retention that was likely secondary to uterine incarceration.  Her most recent ultrasound was performed did not show any uterine incarceration and the uterus was in the appropriate position.  We discussed that the recurrence of uterine incarceration is low as it has resolved and the uterus is beginning to move out of the pelvic rim and into the abdominal cavity.  Precautions were provided for urinary retention if that was to recur she should seek immediate care in the emergency department here at the [facility]. .  Provided note to allow for parking in closer proximity to her office.     RTO in 4 weeks.

## 2024-01-11 LAB
HPV HR 12 DNA CVX QL NAA+PROBE: NEGATIVE
HPV16 DNA CVX QL NAA+PROBE: NEGATIVE
HPV18 DNA CVX QL NAA+PROBE: NEGATIVE

## 2024-01-11 NOTE — TELEPHONE ENCOUNTER
Pt stated she only takes 50 mg daily. She has an appt in April and will like to come in at once then because she f/u with her OB every month.

## 2024-01-12 LAB
C TRACH DNA SPEC QL NAA+PROBE: NEGATIVE
N GONORRHOEA DNA SPEC QL NAA+PROBE: NEGATIVE

## 2024-01-12 NOTE — TELEPHONE ENCOUNTER
I would like at least a virtual visit prior to April.  Please help her to schedule  And I resent in rx for zoloft for 50 mg daily

## 2024-01-15 NOTE — TELEPHONE ENCOUNTER
1/15 3:13pm: [facility] for PT to call back to schedule Virtual appt w/PCP before her appt in April.

## 2024-01-16 LAB
LAB AP GYN PRIMARY INTERPRETATION: NORMAL
LAB AP LMP: NORMAL
Lab: NORMAL

## 2024-01-18 NOTE — PATIENT INSTRUCTIONS
Cory Powers,     Part of routine screening in pregnancy is a maternal AFP level, which is a blood test that should be collected between 16-18 weeks of pregnancy, but can be collected up to 21weeks 6days.  We have placed an order for this lab in your chart. If you can use the [facility]'s lab, you can report to the lab and they will be able to access the order. If you need to use an outside lab, please contact the office for a copy of the lab slip.     Alpha-fetoprotein (AFP) is a protein produced in the liver of a developing fetus. During a baby's development, some AFP passes through the placenta and into the mother's blood. An AFP test measures the level of AFP in pregnant women during the second trimester of pregnancy. Abnormal levels of AFP in a mother's blood may be sign of a neural tube defect, a condition that causes abnormal development of a developing baby's brain and/or spine.     Please contact the office at [phone] with any questions.

## 2024-01-25 ENCOUNTER — TELEMEDICINE (OUTPATIENT)
Dept: FAMILY MEDICINE CLINIC | Facility: CLINIC | Age: 39
End: 2024-01-25
Payer: COMMERCIAL

## 2024-01-25 DIAGNOSIS — F32.A ANXIETY AND DEPRESSION: Primary | ICD-10-CM

## 2024-01-25 DIAGNOSIS — F41.9 ANXIETY AND DEPRESSION: Primary | ICD-10-CM

## 2024-01-25 PROCEDURE — 99214 OFFICE O/P EST MOD 30 MIN: CPT | Performed by: FAMILY MEDICINE

## 2024-01-25 NOTE — PROGRESS NOTES
Virtual Regular Visit    Verification of patient location:    Patient is located at Home in the following state in which I hold an active license [place]      Assessment/Plan:  1. Anxiety and depression  Discussed benefits and risks of taking zoloft during pregnancy  Pt is doing great in terms of controlling anxiety with zoloft - so opting to remain on   If any dose increase needed- pt will reach out.  Continue with lifestyle interventions -ie: mindfulness, walking/exercise.  MURRAY-7 today of 3    Had flu shot  Make sure all family members are vaccinated with flu and adacel vaccines as well.    Will have pt check with OB to see if she can start on pepcid daily to protect GI tract - as combination of ASA and SSRI can increase risk of GI bleed        Problem List Items Addressed This Visit    None           Reason for visit is   Chief Complaint   Patient presents with    Follow-up    Virtual Regular Visit          Encounter provider Carole Jiménez DO    Provider located at [place] [address] [address] [address] [phone]      Recent Visits  No visits were found meeting these conditions.  Showing recent visits within past 7 days and meeting all other requirements  Today's Visits  Date Type Provider Dept   01/25/24 Telemedicine Carole Jiménez DO [facility]   Showing today's visits and meeting all other requirements  Future Appointments  No visits were found meeting these conditions.  Showing future appointments within next 150 days and meeting all other requirements       The patient was identified by name and date of birth. Priya Penaloza was informed that this is a telemedicine visit and that the visit is being conducted through the Epic Embedded platform. She agrees to proceed..  My office door was closed. No one else was in the room.  She acknowledged consent and understanding of privacy and security of the video  platform. The patient has agreed to participate and understands they can discontinue the visit at any time.    Patient is aware this is a billable service.     Sylvia Penaloza is a 38 y.o. female  .      HPI   Pt presents today for visit to discuss ongoing use of zoloft during pregnancy   - 15 weeks  Of note - complication over the holidays of an incarcerated uterus  Prompting use of catheter for urination    Past Medical History:   Diagnosis Date    Cluster headache 2009    Headache, tension-type 2005    Migraine 2009    Varicella     disease       Past Surgical History:   Procedure Laterality Date    WISDOM TOOTH EXTRACTION N/A        Current Outpatient Medications   Medication Sig Dispense Refill    aspirin (ECOTRIN LOW STRENGTH) 81 mg EC tablet Take 2 tablets (162 mg total) by mouth daily Stop at 36 weeks.  Contact your OB or MFM with any side effects.  See [email or web address] 60 tablet 3    MAGNESIUM PO Take by mouth      Prenatal Vit-Fe Fumarate-FA (PRENATAL PO) Take by mouth      Riboflavin (VITAMIN B-2 PO) Take by mouth      sertraline (ZOLOFT) 50 mg tablet TAKE 1 tab po daily; AVOID REGULAR USE OF NSAIDS WHILE TAKING; note - pt states she is taking 50 mg daily 90 tablet 0    Msgnap-[id number]-FM (PRENA1 PO) Take by mouth (Patient not taking: Reported on 2023)       No current facility-administered medications for this visit.        No Known Allergies    Review of Systems   Genitourinary:  Negative for dysuria.   Neurological:  Positive for headaches.        Headaches come and go  Still taking mag and vit B2 - under care of neuro         Video Exam    There were no vitals filed for this visit.    Physical Exam  Constitutional:       General: She is not in acute distress.     Appearance: Normal appearance. She is not ill-appearing or toxic-appearing.   Pulmonary:      Effort: Pulmonary effort is normal. No respiratory distress.   Neurological:       Mental Status: She is alert and oriented to person, place, and time.   Psychiatric:         Mood and Affect: Mood normal.         Behavior: Behavior normal.         Thought Content: Thought content normal.         Judgment: Judgment normal.          Visit Time  Total Visit Duration: 20 min

## 2024-01-25 NOTE — PATIENT INSTRUCTIONS
Ask OB if you can take pepcid during pregnancy - as I would like to protect stomach while on combination of aspirin and zoloft.

## 2024-02-07 ENCOUNTER — ROUTINE PRENATAL (OUTPATIENT)
Dept: OBGYN CLINIC | Facility: CLINIC | Age: 39
End: 2024-02-07

## 2024-02-07 VITALS — SYSTOLIC BLOOD PRESSURE: 124 MMHG | BODY MASS INDEX: 28.06 KG/M2 | WEIGHT: 190 LBS | DIASTOLIC BLOOD PRESSURE: 78 MMHG

## 2024-02-07 DIAGNOSIS — Z3A.17 17 WEEKS GESTATION OF PREGNANCY: ICD-10-CM

## 2024-02-07 DIAGNOSIS — F41.1 GENERALIZED ANXIETY DISORDER: ICD-10-CM

## 2024-02-07 DIAGNOSIS — O09.522 MULTIGRAVIDA OF ADVANCED MATERNAL AGE IN SECOND TRIMESTER: Primary | ICD-10-CM

## 2024-02-07 DIAGNOSIS — O99.342 OTHER MENTAL DISORDERS COMPLICATING PREGNANCY, SECOND TRIMESTER: ICD-10-CM

## 2024-02-07 PROCEDURE — PNV

## 2024-02-07 NOTE — PROGRESS NOTES
Patient is a 39 YO  female presenting to the office at 17w4d for routine OB care.   Patient is feeling well today. Would like to take Pepcid daily for GI protection while on combination Zoloft and ASA therapy per PCP. Patient is also having heartburn and acid reflux, more so at night before bed. Discussed use of Pepcid for symptoms and GI protection. May take 20mg twice daily, may increase to 40mg at night for increase in reflux.   Has noticed a few sharper, right-sided pains that come and go. Typically last for a second or two when they occur. Pain resolves with changing position/walking around. Likely round ligament in nature, discussed pelvic floor physical therapy. Patient also notes muscle fatigue and shoulder joint aches. Has been using heating pads/massaging area.   Afp previously ordered, not yet completed. Plans to go today.  Fetal heart rate: 150  BP: 124/78  TWG: -5lb  Fetal Movement: maybe a flutter or two here and there  LOF: no  VB: no  CTX: no  Reviewed precautions  Call for concerns  RTO 4 weeks

## 2024-02-15 ENCOUNTER — APPOINTMENT (OUTPATIENT)
Dept: LAB | Facility: CLINIC | Age: 39
End: 2024-02-15
Payer: COMMERCIAL

## 2024-02-15 DIAGNOSIS — O34.511 INCARCERATED GRAVID UTERUS IN FIRST TRIMESTER: ICD-10-CM

## 2024-02-15 DIAGNOSIS — O09.521 MULTIGRAVIDA OF ADVANCED MATERNAL AGE IN FIRST TRIMESTER: ICD-10-CM

## 2024-02-15 DIAGNOSIS — Z3A.13 13 WEEKS GESTATION OF PREGNANCY: ICD-10-CM

## 2024-02-15 PROCEDURE — 36415 COLL VENOUS BLD VENIPUNCTURE: CPT

## 2024-02-15 PROCEDURE — 82105 ALPHA-FETOPROTEIN SERUM: CPT

## 2024-02-19 ENCOUNTER — TELEPHONE (OUTPATIENT)
Dept: OBGYN CLINIC | Facility: CLINIC | Age: 39
End: 2024-02-19

## 2024-02-19 DIAGNOSIS — R30.0 DYSURIA: Primary | ICD-10-CM

## 2024-02-19 LAB
2ND TRIMESTER 4 SCREEN SERPL-IMP: NORMAL
AFP ADJ MOM SERPL: 1.68
AFP INTERP AMN-IMP: NORMAL
AFP INTERP SERPL-IMP: NORMAL
AFP INTERP SERPL-IMP: NORMAL
AFP SERPL-MCNC: 69.4 NG/ML
AGE AT DELIVERY: 36.6 YR
GA METHOD: NORMAL
GA: 18.7 WEEKS
IDDM PATIENT QL: NO
MULTIPLE PREGNANCY: NO
NEURAL TUBE DEFECT RISK FETUS: 1713 %

## 2024-02-25 NOTE — PROGRESS NOTES
Please refer to the [facility] ultrasound report in Ob Procedures for additional information regarding today's visit

## 2024-02-26 ENCOUNTER — ROUTINE PRENATAL (OUTPATIENT)
Facility: HOSPITAL | Age: 39
End: 2024-02-26
Payer: COMMERCIAL

## 2024-02-26 VITALS
BODY MASS INDEX: 28.73 KG/M2 | SYSTOLIC BLOOD PRESSURE: 130 MMHG | HEART RATE: 98 BPM | DIASTOLIC BLOOD PRESSURE: 74 MMHG | HEIGHT: 69 IN | WEIGHT: 194 LBS

## 2024-02-26 DIAGNOSIS — O99.342 ANXIETY DURING PREGNANCY, ANTEPARTUM, SECOND TRIMESTER: ICD-10-CM

## 2024-02-26 DIAGNOSIS — Z36.86 ENCOUNTER FOR ANTENATAL SCREENING FOR CERVICAL LENGTH: ICD-10-CM

## 2024-02-26 DIAGNOSIS — Z3A.20 20 WEEKS GESTATION OF PREGNANCY: ICD-10-CM

## 2024-02-26 DIAGNOSIS — F41.9 ANXIETY DURING PREGNANCY, ANTEPARTUM, SECOND TRIMESTER: ICD-10-CM

## 2024-02-26 DIAGNOSIS — O09.522 ELDERLY MULTIGRAVIDA, SECOND TRIMESTER: Primary | ICD-10-CM

## 2024-02-26 PROCEDURE — 76817 TRANSVAGINAL US OBSTETRIC: CPT | Performed by: OBSTETRICS & GYNECOLOGY

## 2024-02-26 PROCEDURE — 76811 OB US DETAILED SNGL FETUS: CPT | Performed by: OBSTETRICS & GYNECOLOGY

## 2024-02-26 PROCEDURE — 99213 OFFICE O/P EST LOW 20 MIN: CPT | Performed by: OBSTETRICS & GYNECOLOGY

## 2024-02-26 NOTE — LETTER
February 26, 2024     Cesia Gray MD  [address]  [address]  [place] [address]    Patient: Priya Penaloza   YOB: 1985   Date of Visit: 2/26/2024       Dear Dr. Gray:    Thank you for referring Priya Pnealoza to me for evaluation. Below are my notes for this consultation.    If you have questions, please do not hesitate to call me. I look forward to following your patient along with you.         Sincerely,        Amadou Dykes MD        CC: No Recipients    Amadou Dykes MD  2/26/2024  1:04 PM  Sign when Signing Visit  Please refer to the [facility] ultrasound report in Ob Procedures for additional information regarding today's visit

## 2024-02-26 NOTE — PROGRESS NOTES
Ultrasound Probe Disinfection    A transvaginal ultrasound was performed.   Prior to use, disinfection was performed with High Level Disinfection Process ([employer]).  Probe serial number A3: [id number] was used.      Krysten Duarte  02/26/24  12:38 PM

## 2024-03-04 ENCOUNTER — TELEPHONE (OUTPATIENT)
Age: 39
End: 2024-03-04

## 2024-03-04 ENCOUNTER — LAB (OUTPATIENT)
Dept: LAB | Facility: CLINIC | Age: 39
End: 2024-03-04
Payer: COMMERCIAL

## 2024-03-04 DIAGNOSIS — R30.0 DYSURIA: ICD-10-CM

## 2024-03-04 LAB
BACTERIA UR QL AUTO: ABNORMAL /HPF
BILIRUB UR QL STRIP: NEGATIVE
CLARITY UR: ABNORMAL
COLOR UR: ABNORMAL
GLUCOSE UR STRIP-MCNC: NEGATIVE MG/DL
HGB UR QL STRIP.AUTO: NEGATIVE
HYALINE CASTS #/AREA URNS LPF: ABNORMAL /LPF
KETONES UR STRIP-MCNC: NEGATIVE MG/DL
LEUKOCYTE ESTERASE UR QL STRIP: ABNORMAL
MUCOUS THREADS UR QL AUTO: ABNORMAL
NITRITE UR QL STRIP: NEGATIVE
NON-SQ EPI CELLS URNS QL MICRO: ABNORMAL /HPF
PH UR STRIP.AUTO: 6.5 [PH]
PROT UR STRIP-MCNC: ABNORMAL MG/DL
RBC #/AREA URNS AUTO: ABNORMAL /HPF
SP GR UR STRIP.AUTO: 1.02 (ref 1–1.03)
URATE CRY URNS QL MICRO: ABNORMAL /HPF
UROBILINOGEN UR STRIP-ACNC: <2 MG/DL
WBC #/AREA URNS AUTO: ABNORMAL /HPF

## 2024-03-04 PROCEDURE — 81001 URINALYSIS AUTO W/SCOPE: CPT

## 2024-03-04 PROCEDURE — 87086 URINE CULTURE/COLONY COUNT: CPT

## 2024-03-05 NOTE — TELEPHONE ENCOUNTER
Placed call to the patient- aware that we are awaiting her full culture result (typically takes 24hr to get those results). Aware that we will give her a call once those results come through.

## 2024-03-06 LAB — BACTERIA UR CULT: NORMAL

## 2024-03-07 ENCOUNTER — ROUTINE PRENATAL (OUTPATIENT)
Dept: OBGYN CLINIC | Facility: CLINIC | Age: 39
End: 2024-03-07
Payer: COMMERCIAL

## 2024-03-07 VITALS — WEIGHT: 192.3 LBS | SYSTOLIC BLOOD PRESSURE: 136 MMHG | DIASTOLIC BLOOD PRESSURE: 74 MMHG | BODY MASS INDEX: 28.4 KG/M2

## 2024-03-07 DIAGNOSIS — O09.512 AMA (ADVANCED MATERNAL AGE) PRIMIGRAVIDA 35+, SECOND TRIMESTER: ICD-10-CM

## 2024-03-07 DIAGNOSIS — Z3A.21 21 WEEKS GESTATION OF PREGNANCY: Primary | ICD-10-CM

## 2024-03-07 LAB
SL AMB  POCT GLUCOSE, UA: NEGATIVE
SL AMB POCT URINE PROTEIN: NORMAL

## 2024-03-07 PROCEDURE — 81003 URINALYSIS AUTO W/O SCOPE: CPT | Performed by: PHYSICIAN ASSISTANT

## 2024-03-07 PROCEDURE — PNV: Performed by: PHYSICIAN ASSISTANT

## 2024-03-07 NOTE — PROGRESS NOTES
Patient is a 37 YO  female presenting to the office at 21.5 weeks for routine OB care.   BP: 136/74  TWG: -2lb  Fetal Movement: flutters  LOF: no  VB: no  CTX: no  Last weekend had some right sided sharp groin pain, bothered her while sitting in the car for long drive. Discussed possible round ligament pain. Recommend stretching, yoga  Discussed eating popsicles, waterice   Reviewed precautions  Call for concerns  RTO 4 weeks

## 2024-03-11 ENCOUNTER — CLINICAL SUPPORT (OUTPATIENT)
Age: 39
End: 2024-03-11

## 2024-03-11 DIAGNOSIS — Z32.2 ENCOUNTER FOR CHILDBIRTH INSTRUCTION: Primary | ICD-10-CM

## 2024-04-04 ENCOUNTER — ROUTINE PRENATAL (OUTPATIENT)
Dept: OBGYN CLINIC | Facility: CLINIC | Age: 39
End: 2024-04-04

## 2024-04-04 VITALS — DIASTOLIC BLOOD PRESSURE: 78 MMHG | SYSTOLIC BLOOD PRESSURE: 132 MMHG | WEIGHT: 195 LBS | BODY MASS INDEX: 28.8 KG/M2

## 2024-04-04 DIAGNOSIS — F41.8 DEPRESSION WITH ANXIETY: ICD-10-CM

## 2024-04-04 DIAGNOSIS — Z36.9 ANTENATAL SCREENING ENCOUNTER: ICD-10-CM

## 2024-04-04 DIAGNOSIS — Z34.03 PRENATAL CARE, FIRST PREGNANCY IN THIRD TRIMESTER: Primary | ICD-10-CM

## 2024-04-04 PROCEDURE — PNV: Performed by: PHYSICIAN ASSISTANT

## 2024-04-04 NOTE — PROGRESS NOTES
Pt is here for her 25w prenatal. Labs ordered. Pt denies any leakage,bleeding,cramping, or contractions.urine dip is neg/neg

## 2024-04-12 NOTE — PROGRESS NOTES
38 y.o.  female at 26w6d (Estimated Date of Delivery: 24) for PNV.    Pre-Shilo Vitals      Flowsheet Row Most Recent Value   Prenatal Assessment    Movement Present   Prenatal Vitals    Blood Pressure 132/78   Weight - Scale 88.5 kg (195 lb)   Urine Albumin/Glucose    Dilation/Effacement/Station    Vaginal Drainage    Edema           TW kg (0 lb)    Leakage of fluid: no  Vaginal bleeding: no  Contractions/Cramping: no  Fetal movement: yes  Feeling well  Rx 28 week labs  O pos    RTO in 4 weeks.

## 2024-04-13 ENCOUNTER — APPOINTMENT (OUTPATIENT)
Dept: LAB | Facility: CLINIC | Age: 39
End: 2024-04-13
Payer: COMMERCIAL

## 2024-04-13 DIAGNOSIS — Z36.9 ANTENATAL SCREENING ENCOUNTER: ICD-10-CM

## 2024-04-13 DIAGNOSIS — Z34.03 PRENATAL CARE, FIRST PREGNANCY IN THIRD TRIMESTER: ICD-10-CM

## 2024-04-13 LAB
ERYTHROCYTE [DISTWIDTH] IN BLOOD BY AUTOMATED COUNT: 13.1 % (ref 11.6–15.1)
HCT VFR BLD AUTO: 37.2 % (ref 34.8–46.1)
HGB BLD-MCNC: 12.2 G/DL (ref 11.5–15.4)
MCH RBC QN AUTO: 30.4 PG (ref 26.8–34.3)
MCHC RBC AUTO-ENTMCNC: 32.8 G/DL (ref 31.4–37.4)
MCV RBC AUTO: 93 FL (ref 82–98)
PLATELET # BLD AUTO: 242 THOUSANDS/UL (ref 149–390)
PMV BLD AUTO: 9.4 FL (ref 8.9–12.7)
RBC # BLD AUTO: 4.01 MILLION/UL (ref 3.81–5.12)
WBC # BLD AUTO: 9.9 THOUSAND/UL (ref 4.31–10.16)

## 2024-04-13 PROCEDURE — 82950 GLUCOSE TEST: CPT

## 2024-04-13 PROCEDURE — 36415 COLL VENOUS BLD VENIPUNCTURE: CPT

## 2024-04-13 PROCEDURE — 86780 TREPONEMA PALLIDUM: CPT

## 2024-04-13 PROCEDURE — 85027 COMPLETE CBC AUTOMATED: CPT

## 2024-04-14 LAB
GLUCOSE 1H P 50 G GLC PO SERPL-MCNC: 128 MG/DL (ref 70–134)
TREPONEMA PALLIDUM IGG+IGM AB [PRESENCE] IN SERUM OR PLASMA BY IMMUNOASSAY: NORMAL

## 2024-04-25 ENCOUNTER — ROUTINE PRENATAL (OUTPATIENT)
Dept: OBGYN CLINIC | Facility: CLINIC | Age: 39
End: 2024-04-25
Payer: COMMERCIAL

## 2024-04-25 VITALS — WEIGHT: 196 LBS | DIASTOLIC BLOOD PRESSURE: 76 MMHG | SYSTOLIC BLOOD PRESSURE: 122 MMHG | BODY MASS INDEX: 28.94 KG/M2

## 2024-04-25 DIAGNOSIS — O09.513 AMA (ADVANCED MATERNAL AGE) PRIMIGRAVIDA 35+, THIRD TRIMESTER: Primary | ICD-10-CM

## 2024-04-25 DIAGNOSIS — Z3A.28 28 WEEKS GESTATION OF PREGNANCY: ICD-10-CM

## 2024-04-25 DIAGNOSIS — Z23 NEED FOR TDAP VACCINATION: ICD-10-CM

## 2024-04-25 PROCEDURE — 90715 TDAP VACCINE 7 YRS/> IM: CPT | Performed by: PHYSICIAN ASSISTANT

## 2024-04-25 PROCEDURE — PNV: Performed by: PHYSICIAN ASSISTANT

## 2024-04-25 PROCEDURE — 90471 IMMUNIZATION ADMIN: CPT | Performed by: PHYSICIAN ASSISTANT

## 2024-04-25 NOTE — PROGRESS NOTES
Red folder due today.  TDAP offered and accepted. Vaccine given in left deltoid without difficulty, patient tolerated shot well.   Breast pump-already has one.   Patient is doing well, she has no concerns at this time.   Urine dip neg/neg.

## 2024-04-25 NOTE — PROGRESS NOTES
Patient is a 37 YO  female presenting to the office at 28.5 weeks for routine OB care.   BP: 122/76  TWlb  Fetal Movement: yes good movement  Feeling well today  LOF: no  VB: no  CTX: no  Discussed third trimester teaching  Reviewed fetal kick counts, normal FM  Reviewed signs of PTL  Reviewed red folder, consents, birth plan  Delivery consent obtained   Breastfeeding: plans to  Breast Pump: has one  TDAP: received today  FLU Vaccine: recommend  COVID Vaccine: recommend  Rhogam: not indicated  28 Week Labs: completed, WNL  RTO 2 weeks for routine OB F/U

## 2024-05-01 ENCOUNTER — CLINICAL SUPPORT (OUTPATIENT)
Dept: POSTPARTUM | Facility: CLINIC | Age: 39
End: 2024-05-01

## 2024-05-01 DIAGNOSIS — Z32.2 ENCOUNTER FOR CHILDBIRTH INSTRUCTION: Primary | ICD-10-CM

## 2024-05-06 ENCOUNTER — ROUTINE PRENATAL (OUTPATIENT)
Dept: OBGYN CLINIC | Facility: CLINIC | Age: 39
End: 2024-05-06

## 2024-05-06 VITALS — BODY MASS INDEX: 29.98 KG/M2 | SYSTOLIC BLOOD PRESSURE: 128 MMHG | DIASTOLIC BLOOD PRESSURE: 76 MMHG | WEIGHT: 203 LBS

## 2024-05-06 DIAGNOSIS — O09.523 MULTIGRAVIDA OF ADVANCED MATERNAL AGE IN THIRD TRIMESTER: Primary | ICD-10-CM

## 2024-05-06 DIAGNOSIS — Z3A.30 30 WEEKS GESTATION OF PREGNANCY: ICD-10-CM

## 2024-05-06 DIAGNOSIS — M54.50 LEFT-SIDED LOW BACK PAIN WITHOUT SCIATICA, UNSPECIFIED CHRONICITY: ICD-10-CM

## 2024-05-06 PROCEDURE — PNV: Performed by: OBSTETRICS & GYNECOLOGY

## 2024-05-06 NOTE — PROGRESS NOTES
38 y.o.  female at 30w2d (Estimated Date of Delivery: 24) for PNV.    Pre-Shilo Vitals      Flowsheet Row Most Recent Value   Prenatal Assessment    Fetal Heart Rate 160   Fundal Height (cm) 30 cm   Movement Present   Prenatal Vitals    Blood Pressure 128/76   Weight - Scale 92.1 kg (203 lb)   Urine Albumin/Glucose    Dilation/Effacement/Station    Vaginal Drainage    Edema           TWG: 3.629 kg (8 lb)    Leakage of fluid: no  Vaginal bleeding: no  Contractions/Cramping: no  Fetal movement: yes    Growth US in 2 weeks.   Some low back pain on one side. Started intermittently, now is constant. Discussed pelvic floor PT - referral placed.   Also c/o restless legs. Discussed warm baths, yoga. Magnesium, benadryl at bedtime.     RTO in 2 weeks.

## 2024-05-06 NOTE — PATIENT INSTRUCTIONS
[email or web address]    Physical Therapy at [facility]’s team of trained female therapists offer a wide variety of services designed to address the special healthcare needs of women. Our specially trained clinicians work with you to address your concerns and come beside you to support and encourage you through the healing process. Our offices are designed to keep your sensitive treatments private at all times. We are here to ensure your comfort and mentor you through our pelvic floor therapy programs at your pace.    Our female team of experts treat the following conditions faced by women:      Urinary or bowel incontinence  Urinary urgency or frequency  Painful [place]  Painful Bladder Syndrome  Overactive Bladder  Constipation  Pelvic Girdle Pain             Sacroiliac Dysfunction   Gynecological Cancers    Pregnancy & Postpartum related discomfort  Buttock/Tailbone Pain                       Lumbar/Thoracic Pain  Hip Abnormalities/Pain    Scar Adhesions  Diastasis Recti  Osteoporosis          /Episiotomy Recovery  Vulvodynia  Pelvic Pain

## 2024-05-21 ENCOUNTER — ROUTINE PRENATAL (OUTPATIENT)
Dept: OBGYN CLINIC | Facility: CLINIC | Age: 39
End: 2024-05-21

## 2024-05-21 VITALS — BODY MASS INDEX: 30.27 KG/M2 | DIASTOLIC BLOOD PRESSURE: 78 MMHG | WEIGHT: 205 LBS | SYSTOLIC BLOOD PRESSURE: 124 MMHG

## 2024-05-21 DIAGNOSIS — Z3A.32 32 WEEKS GESTATION OF PREGNANCY: ICD-10-CM

## 2024-05-21 DIAGNOSIS — O09.513 AMA (ADVANCED MATERNAL AGE) PRIMIGRAVIDA 35+, THIRD TRIMESTER: Primary | ICD-10-CM

## 2024-05-21 PROCEDURE — PNV: Performed by: OBSTETRICS & GYNECOLOGY

## 2024-05-21 NOTE — PROGRESS NOTES
38 y.o.   female at 32.3 wga for PNV. BP : 124/78. TW.3  + FM, - LOF, - Ctxn, - bleeding  Feeling well.  No complaints  PNC on Friday  Reviewed PTL precautions and FKCs  F/u 2 weeks

## 2024-05-21 NOTE — PROGRESS NOTES
Patient has a few things she would like to discuss with you today-pelvic and back pain. Urine dip neg/neg.

## 2024-05-24 ENCOUNTER — ULTRASOUND (OUTPATIENT)
Facility: HOSPITAL | Age: 39
End: 2024-05-24
Payer: COMMERCIAL

## 2024-05-24 VITALS
WEIGHT: 208.11 LBS | HEART RATE: 94 BPM | SYSTOLIC BLOOD PRESSURE: 124 MMHG | DIASTOLIC BLOOD PRESSURE: 78 MMHG | BODY MASS INDEX: 30.82 KG/M2 | HEIGHT: 69 IN

## 2024-05-24 DIAGNOSIS — Z36.89 ENCOUNTER FOR ULTRASOUND TO ASSESS FETAL GROWTH: ICD-10-CM

## 2024-05-24 DIAGNOSIS — Z3A.32 32 WEEKS GESTATION OF PREGNANCY: Primary | ICD-10-CM

## 2024-05-24 DIAGNOSIS — Z36.2 ENCOUNTER FOR FOLLOW-UP ULTRASOUND OF FETAL ANATOMY: ICD-10-CM

## 2024-05-24 DIAGNOSIS — O09.513 AMA (ADVANCED MATERNAL AGE) PRIMIGRAVIDA 35+, THIRD TRIMESTER: ICD-10-CM

## 2024-05-24 PROBLEM — O09.523 MULTIGRAVIDA OF ADVANCED MATERNAL AGE IN THIRD TRIMESTER: Status: RESOLVED | Noted: 2023-12-29 | Resolved: 2024-05-24

## 2024-05-24 PROCEDURE — 76816 OB US FOLLOW-UP PER FETUS: CPT | Performed by: OBSTETRICS & GYNECOLOGY

## 2024-05-24 NOTE — LETTER
"May 24, 2024     Radha Francisco PA-C  [address] [facility] [facility] [address]    Patient: Priya Johnston   YOB: 1985   Date of Visit: 2024       Dear JACQUI Francisco:    Thank you for referring Priya Johnston to me for evaluation. Below are my notes for this consultation.    If you have questions, please do not hesitate to call me. I look forward to following your patient along with you.         Sincerely,        Vanessa Nieves MD        CC: No Recipients    Vanessa Nieves MD  2024  2:18 PM  Sign when Signing Visit  [facility]: Priya Johnston was seen today at 32w6d for fetal growth and followup missed anatomy ultrasound.  See ultrasound report under \"OB Procedures\" tab.  Please don't hesitate to contact our office with any concerns or questions.  -Vanessa Nieves MD    "

## 2024-05-24 NOTE — PROGRESS NOTES
"[facility]: Priya Johnston was seen today at 32w6d for fetal growth and followup missed anatomy ultrasound.  See ultrasound report under \"OB Procedures\" tab.  Please don't hesitate to contact our office with any concerns or questions.  -Vanessa Nieves MD    "

## 2024-05-24 NOTE — LETTER
May 24, 2024     Priya Johnston    Patient: Priya Johnston   YOB: 1985   Date of Visit: 5/24/2024       Dear Priya,    I reviewed your ultrasound images from today, which looked excellent. Full details should be visible to you in your Mychart under results. Baby weighs approximately 4lb5oz today, which is 27th percentile (normal) for this gestational age. Amniotic fluid is normal, baby is head down and in position for delivery. All of baby's parts look normal. Estimated birth weight is around 7lb at your due date based on today's ultrasound. Please continue counting kicks and receiving routine prenatal care. Your low dose aspirin should be discontinued once you reach 36 weeks pregnant. You do not need additional ultrasounds unless your OB/GYN recommends anything else. Best wishes for a healthy remainder of the pregnancy.    Sincerely,  Vanessa Nieves MD

## 2024-05-29 ENCOUNTER — OFFICE VISIT (OUTPATIENT)
Dept: NEUROLOGY | Facility: CLINIC | Age: 39
End: 2024-05-29
Payer: COMMERCIAL

## 2024-05-29 ENCOUNTER — TELEPHONE (OUTPATIENT)
Age: 39
End: 2024-05-29

## 2024-05-29 VITALS
OXYGEN SATURATION: 97 % | BODY MASS INDEX: 30.47 KG/M2 | TEMPERATURE: 98 F | WEIGHT: 205.7 LBS | SYSTOLIC BLOOD PRESSURE: 122 MMHG | HEIGHT: 69 IN | DIASTOLIC BLOOD PRESSURE: 86 MMHG | HEART RATE: 101 BPM

## 2024-05-29 DIAGNOSIS — E66.9 OBESITY (BMI 30-39.9): ICD-10-CM

## 2024-05-29 DIAGNOSIS — F41.9 ANXIETY DISORDER: ICD-10-CM

## 2024-05-29 DIAGNOSIS — G43.109 MIGRAINE WITH AURA AND WITHOUT STATUS MIGRAINOSUS, NOT INTRACTABLE: Primary | ICD-10-CM

## 2024-05-29 DIAGNOSIS — O26.899 HEADACHE IN PREGNANCY: ICD-10-CM

## 2024-05-29 DIAGNOSIS — R51.9 HEADACHE IN PREGNANCY: ICD-10-CM

## 2024-05-29 PROCEDURE — 99214 OFFICE O/P EST MOD 30 MIN: CPT | Performed by: STUDENT IN AN ORGANIZED HEALTH CARE EDUCATION/TRAINING PROGRAM

## 2024-05-29 NOTE — PROGRESS NOTES
Review of Systems   Constitutional:  Negative for appetite change, fatigue and fever.   HENT: Negative.  Negative for hearing loss, tinnitus, trouble swallowing and voice change.    Eyes: Negative.  Negative for photophobia, pain and visual disturbance.   Respiratory: Negative.  Negative for shortness of breath.    Cardiovascular: Negative.  Negative for palpitations.   Gastrointestinal: Negative.  Negative for nausea and vomiting.   Endocrine: Negative.  Negative for cold intolerance.   Genitourinary: Negative.  Negative for dysuria, frequency and urgency.   Musculoskeletal:  Negative for back pain, gait problem, myalgias, neck pain and neck stiffness.   Skin: Negative.  Negative for rash.   Allergic/Immunologic: Negative.    Neurological:  Positive for headaches. Negative for dizziness, tremors, seizures, syncope, facial asymmetry, speech difficulty, weakness, light-headedness and numbness.   Hematological: Negative.  Does not bruise/bleed easily.   Psychiatric/Behavioral: Negative.  Negative for confusion, hallucinations and sleep disturbance.    All other systems reviewed and are negative.    Since your last visit are your headaches Worsened    Any change to the headache type? No     What is your current headache frequency: 2-3 per month     Are you taking your current medications as prescribed? N/A    Do you have any side effects? N/A    How may days per week do you take an abortive medicine? 0/7

## 2024-05-29 NOTE — PATIENT INSTRUCTIONS
Headache Calendar  Please maintain a headache calendar  Consider using phone applications such as Migraine Rafael or [place] Migraine Tracker     Headache/migraine treatment:   Acute medications (for immediate treatment of a headache):   It is ok to take acetaminophen (Tylenol) if they help your headaches you should limit these to No more than 2-3 times a week to avoid medication overuse/rebound headaches.      Over the counter preventive supplements for headaches/migraines  [x] Magnesium 400mg daily (If any diarrhea or upset stomach, decrease dose  as tolerated) Oxide or glycinate  [x] Riboflavin (Vitamin B2) 400mg daily - (FYI B2 may make your urine bright/neon yellow)     Lifestyle Recommendations:  [x] SLEEP - Maintain a regular sleep schedule: Adults need at least 7-8 hours of uninterrupted a night. Maintain good sleep hygiene:  Going to bed and waking up at consistent times, avoiding excessive daytime naps, avoiding caffeinated beverages in the evening, avoid excessive stimulation in the evening and generally using bed primarily for sleeping.  One hour before bedtime would recommend turning lights down lower, decreasing your activity (may read quietly, listen to music at a low volume). When you get into bed, should eliminate all technology (no texting, emailing, playing with your phone, iPad or tablet in bed).  [x] HYDRATION - Maintain good hydration.  Drink  2L of fluid a day (4 typical small water bottles)  [x] DIET - Maintain good nutrition. In particular don't skip meals and try and eat healthy balanced meals regularly.  [x] TRIGGERS - Look for other triggers and avoid them: Limit caffeine to 1-2 cups a day or less. Avoid dietary triggers that you have noticed bring on your headaches (this could include aged cheese, peanuts, MSG, aspartame and nitrates).  [x] EXERCISE - physical exercise as we all know is good for you in many ways, and not only is good for your heart, but also is beneficial for your mental  health, cognitive health and  chronic pain/headaches. I would encourage at the least 5 days of physical exercise weekly for at least 30 minutes.      Education and Follow-up  [x] Please call with any questions or concerns. Of course if any new concerning symptoms go to the emergency department.  [x] Follow up in 4 months

## 2024-05-29 NOTE — TELEPHONE ENCOUNTER
[facility] is requesting an insurance referral for the following specialty:      Test Name / Order Name:  [facility]    DX Code: O09.523, Z3A.30, M54.50      Date Of Service: 5/31/2024    Location/Facility Name/Address/Phone #:   [facility] - [address]  [place], [place]    Location / Facility NPI: [id number]    Best Phone # To Reach The Patient:  [phone]    Please fax copy to [phone]

## 2024-05-29 NOTE — PROGRESS NOTES
[facility] Consult - Follow up   PATIENT:  Priya Johnston  MRN:  [id number]  :  1985  DATE OF SERVICE:  2024  REFERRED BY: No ref. provider found  PMD: Carole Jiménez DO    Assessment/Plan:   Priya Penaloza is a delightful 38 y.o. female with a past medical history that includes migraines and anxiety here for f/u evaluation of headache.     At today's visit, she continues to report about 2-3 headache days per month.  She feels that they have slightly worsened due to the fact that Tylenol is not as effective as it used to be.  She continues with magnesium and B2 supplements daily.  We discussed other potential treatment options, but she preferred to keep her regimen the same as it is.  She is planning on breast-feeding and will keep me updated post delivery on how she is doing.  She is due around the middle of July and will reach out if she needs to discuss treatment options prior to her follow-up around September.    Workup:  - Neurologic assessment reveals unremarkable neurological exam.  - With no new or concerning symptoms, no red flags and an unremarkable neurologic exam, there is no specific indication for further evaluation with MRI brain.  However, this could be obtained at any time if indicated.      Preventative:  - we discussed headache hygiene and lifestyle factors that may improve headaches  - Mg and B2  - Currently on through other providers: Sertraline  - Past/ failed/contraindicated: Aimovig (stopped due to planning for pregnancy), Topamax (did not help), Candesartan (light headed), Gabapentin (worked initially, but then stopped)  - future options: Memantine, Diamox, Propranolol, Botox, cyproheptadine, CGRP     Acute:  - discussed not taking over-the-counter or prescription pain medications more than 2-3 days per week to prevent medication overuse/rebound headache  - Currently on through other providers: None  - Past/ failed/contraindicated:  Compazine (did not help), Rizatriptan  - future options:  Triptan, Toradol IM or p.o., dexamethasone 2 mg daily for prolonged migraine, ubrelvy, reyvow, nurtec, zavzpret  Patient instructions   Headache Calendar  Please maintain a headache calendar  Consider using phone applications such as Migraine Rafael or Russian Migraine Tracker     Headache/migraine treatment:   Acute medications (for immediate treatment of a headache):   It is ok to take acetaminophen (Tylenol) if they help your headaches you should limit these to No more than 2-3 times a week to avoid medication overuse/rebound headaches.      Over the counter preventive supplements for headaches/migraines  [x] Magnesium 400mg daily (If any diarrhea or upset stomach, decrease dose  as tolerated) Oxide or glycinate  [x] Riboflavin (Vitamin B2) 400mg daily - (FYI B2 may make your urine bright/neon yellow)     Lifestyle Recommendations:  [x] SLEEP - Maintain a regular sleep schedule: Adults need at least 7-8 hours of uninterrupted a night. Maintain good sleep hygiene:  Going to bed and waking up at consistent times, avoiding excessive daytime naps, avoiding caffeinated beverages in the evening, avoid excessive stimulation in the evening and generally using bed primarily for sleeping.  One hour before bedtime would recommend turning lights down lower, decreasing your activity (may read quietly, listen to music at a low volume). When you get into bed, should eliminate all technology (no texting, emailing, playing with your phone, iPad or tablet in bed).  [x] HYDRATION - Maintain good hydration.  Drink  2L of fluid a day (4 typical small water bottles)  [x] DIET - Maintain good nutrition. In particular don't skip meals and try and eat healthy balanced meals regularly.  [x] TRIGGERS - Look for other triggers and avoid them: Limit caffeine to 1-2 cups a day or less. Avoid dietary triggers that you have noticed bring on your headaches (this could include aged cheese,  peanuts, MSG, aspartame and nitrates).  [x] EXERCISE - physical exercise as we all know is good for you in many ways, and not only is good for your heart, but also is beneficial for your mental health, cognitive health and  chronic pain/headaches. I would encourage at the least 5 days of physical exercise weekly for at least 30 minutes.      Education and Follow-up  [x] Please call with any questions or concerns. Of course if any new concerning symptoms go to the emergency department.  [x] Follow up in 4 months  Subjective:   5/29/24: Since our last visit, she was seen by Gigi in November 2023 at which time she reported that she was about 7 weeks pregnant.  She had stopped all of her medications at that time, but continued with magnesium and B2 supplements.  At today's visit, she reports about 2-3 headache days per month. She does not find Tylenol to be as helpful. She continues with Mg and B2 supplements daily.     Previous History:  5/31/23: Since her last visit, she reports that her headaches have improved.  Currently experiencing about 1-4 headache days per month.  She is taking magnesium and B2 supplements daily. Reports that the severity of the headaches have decreased as well. Naproxen works for abortive management, but when she needs to take Maxalt it works as well.    11/1/22: Ms. Penaloza reports a longstanding history of migraines for which she has previously followed with Neurology.  She was most recently seen at the [facility] but because she moved she was lost to follow-up.  She reports that she used to be on Aimovig but stopped it as she is considering pregnancy within the next 6 months.  From an abortive standpoint she has been taking naproxen about 2-3 times per week but finds that it may be losing its efficacy.  We discussed that due to her infrequent headaches at this time I do not think she needs a prescription preventive medication especially with the potential for pregnancy in the  near future.  However, I suggested that she try magnesium and riboflavin supplements as they are safe in pregnancy as well.  From an abortive standpoint, I emphasized that while triptans are considered somewhat safe in pregnancy depending on each individual case there is always a risk of birth defects.  We discussed that it is likely safe pre pregnancy but as soon as she finds that she is pregnant she should stop it.  I have asked her to keep me updated as time goes on and if we consider any medications in the future it will be a joint discussion between myself, the patient, and OBGYN.    Past Medical History:     Past Medical History:   Diagnosis Date    Cluster headache August 2009    Headache, tension-type August 2005    Migraine August 2009    Varicella     disease       Patient Active Problem List   Diagnosis    Migraine with aura and without status migrainosus, not intractable    Nausea/vomiting in pregnancy    Incarcerated gravid uterus in first trimester    Anxiety during pregnancy, antepartum, second trimester    AMA (advanced maternal age) primigravida 35+, third trimester    32 weeks gestation of pregnancy       Medications:      Current Outpatient Medications   Medication Sig Dispense Refill    aspirin (ECOTRIN LOW STRENGTH) 81 mg EC tablet Take 2 tablets (162 mg total) by mouth daily Stop at 36 weeks.  Contact your OB or MFM with any side effects.  See [email or web address] 60 tablet 3    MAGNESIUM PO Take by mouth      Prenatal Vit-Fe Fumarate-FA (PRENATAL PO) Take by mouth      Riboflavin (VITAMIN B-2 PO) Take by mouth      sertraline (ZOLOFT) 50 mg tablet TAKE 1 tab po daily; AVOID REGULAR USE OF NSAIDS WHILE TAKING; note - pt states she is taking 50 mg daily 90 tablet 1     No current facility-administered medications for this visit.        Allergies:    No Known Allergies    Family History:     Family History   Problem Relation Age of Onset    Osteoporosis Mother     Migraines Mother      Asthma Mother     Diabetes Mother     Cancer Father         prostate    Diabetes Father     Dementia Maternal Grandmother     Migraines Maternal Grandmother     No Known Problems Half-Brother     No Known Problems Half-Sister        Social History:     Social History     Socioeconomic History    Marital status: /Civil Union     Spouse name: Not on file    Number of children: Not on file    Years of education: Not on file    Highest education level: Not on file   Occupational History    Not on file   Tobacco Use    Smoking status: Never    Smokeless tobacco: Never   Vaping Use    Vaping status: Never Used   Substance and Sexual Activity    Alcohol use: Not Currently     Alcohol/week: 2.0 standard drinks of alcohol     Types: 2 Cans of beer per week     Comment: socially    Drug use: Not Currently     Comment: nothing within the past 2 years (THC in past)    Sexual activity: Yes     Partners: Male   Other Topics Concern    Not on file   Social History Narrative    Not on file     Social Determinants of Health     Financial Resource Strain: Not on file   Food Insecurity: No Food Insecurity (5/6/2024)    Hunger Vital Sign     Worried About Running Out of Food in the Last Year: Never true     Ran Out of Food in the Last Year: Never true   Transportation Needs: No Transportation Needs (5/6/2024)    PRAPARE - Transportation     Lack of Transportation (Medical): No     Lack of Transportation (Non-Medical): No   Physical Activity: Not on file   Stress: Not on file   Social Connections: Not on file   Intimate Partner Violence: Not on file   Housing Stability: Low Risk  (5/6/2024)    Housing Stability Vital Sign     Unable to Pay for Housing in the Last Year: No     Number of Places Lived in the Last Year: 1     Unstable Housing in the Last Year: No         Objective:   Physical Exam:                                                               Vitals:            Constitutional:  /86 (BP Location: Left arm,  "Patient Position: Sitting, Cuff Size: Adult)   Pulse 101   Temp 98 °F (36.7 °C) (Temporal)   Ht 5' 9\" (1.753 m)   Wt 93.3 kg (205 lb 11.2 oz)   LMP 10/07/2023   SpO2 97%   BMI 30.38 kg/m²   BP Readings from Last 3 Encounters:   05/29/24 122/86   05/24/24 124/78   05/21/24 124/78     Pulse Readings from Last 3 Encounters:   05/29/24 101   05/24/24 94   02/26/24 98         Well developed, well nourished, well groomed. No dysmorphic features.       HEENT:  Normocephalic atraumatic. See neuro exam   Chest:  Respirations appear regular and unlabored.    Cardiovascular:  no observed significant swelling.    Musculoskeletal:  (see below under neurologic exam for evaluation of motor function and gait)   Skin:  warm and dry, not diaphoretic.    Psychiatric:  Normal behavior and appropriate affect       Neurological Examination:     Mental status/cognitive function:   Recent and remote memory intact. Attention span and concentration as well as fund of knowledge are appropriate for age. Normal language and spontaneous speech.  Cranial Nerves:  III, IV, VI-Pupils were equal, round. Extraocular movements were full and conjugate   VII-facial expression symmetric  VIII-hearing grossly intact bilaterally   Motor Exam: symmetric bulk throughout. no atrophy, fasciculations or abnormal movements noted.   Coordination:  no apparent dysmetria, ataxia or tremor noted  Gait: steady casual gait   Review of Systems:   Constitutional:  Negative for appetite change, fatigue and fever.   HENT: Negative.  Negative for hearing loss, tinnitus, trouble swallowing and voice change.    Eyes: Negative.  Negative for photophobia, pain and visual disturbance.   Respiratory: Negative.  Negative for shortness of breath.    Cardiovascular: Negative.  Negative for palpitations.   Gastrointestinal: Negative.  Negative for nausea and vomiting.   Endocrine: Negative.  Negative for cold intolerance.   Genitourinary: Negative.  Negative for dysuria, " frequency and urgency.   Musculoskeletal:  Negative for back pain, gait problem, myalgias, neck pain and neck stiffness.   Skin: Negative.  Negative for rash.   Allergic/Immunologic: Negative.    Neurological:  Positive for headaches. Negative for dizziness, tremors, seizures, syncope, facial asymmetry, speech difficulty, weakness, light-headedness and numbness.   Hematological: Negative.  Does not bruise/bleed easily.   Psychiatric/Behavioral: Negative.  Negative for confusion, hallucinations and sleep disturbance.    All other systems reviewed and are negative.    I have spent 15 minutes with Patient  today in which greater than 50% of this time was spent in counseling/coordination of care regarding Prognosis, Risks and benefits of tx options, Patient and family education, Importance of tx compliance, Impressions, Documenting in the medical record, Reviewing / ordering tests, medicine, procedures  , and Obtaining or reviewing history  . I also spent 15 minutes non face to face for this patient the same day.     Activity Minutes   Precharting/reviewing 10   Patient care/counseling 15   Postcharting/care coordination 5       Author:  Stu Tineo DO 5/29/2024 9:07 AM

## 2024-05-30 ENCOUNTER — PATIENT MESSAGE (OUTPATIENT)
Dept: OBGYN CLINIC | Facility: CLINIC | Age: 39
End: 2024-05-30

## 2024-05-31 ENCOUNTER — EVALUATION (OUTPATIENT)
Dept: PHYSICAL THERAPY | Facility: REHABILITATION | Age: 39
End: 2024-05-31
Payer: COMMERCIAL

## 2024-05-31 DIAGNOSIS — O09.523 MULTIGRAVIDA OF ADVANCED MATERNAL AGE IN THIRD TRIMESTER: ICD-10-CM

## 2024-05-31 DIAGNOSIS — M54.50 LEFT-SIDED LOW BACK PAIN WITHOUT SCIATICA, UNSPECIFIED CHRONICITY: ICD-10-CM

## 2024-05-31 DIAGNOSIS — Z3A.30 30 WEEKS GESTATION OF PREGNANCY: ICD-10-CM

## 2024-05-31 PROCEDURE — 97162 PT EVAL MOD COMPLEX 30 MIN: CPT

## 2024-05-31 PROCEDURE — 97110 THERAPEUTIC EXERCISES: CPT

## 2024-05-31 NOTE — PROGRESS NOTES
"PT Evaluation     Today's date: 2024  Patient name: Priya Johnston  : 1985  MRN: [id number]  Referring provider: Leigh Estrella, *  Dx:   Encounter Diagnosis     ICD-10-CM    1. Multigravida of advanced maternal age in third trimester  O09.523 Ambulatory Referral to Physical Therapy      2. 30 weeks gestation of pregnancy  Z3A.30 Ambulatory Referral to Physical Therapy      3. Left-sided low back pain without sciatica, unspecified chronicity  M54.50 Ambulatory Referral to Physical Therapy          Chief Complaint: Patient reports pelvic pain in the anterior and lateral portion of the pelvis. Patient reports onset of sciatic pain around 25 weeks that then reduced.   HPI: Pt presents to PT with reports of pelvic pain beginning around 20 weeks gestation.   Aggravating factors: Prolonged sitting, rolling over in bed, ambulation, stair navigation.   Relieving factors: None.   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   Social: Lives in a house with her .   PMH: Reviewed.      Urinary:     Onset: Patient denies any urinary leakage at this time.   Urinary Frequency: 5-8x/day  Fluid intake: Patient consumes 2-3 Daniel cups a day.    Bowel: Patient denies straining or constipation at this time.    Defecates 1x/day .   GYN:         Sexual Function: Patient denies any history of internal pelvic pain.        MSK:        Current exercise: Patient used to run, but is now walking.    Pain:      Location: lateral and anterior  Onset: 20 weeks gestation  Description: \"If I turn over I feel like my pelvic is going to crack.\"  0 current; 0 at best; 8 at worst with rolling over and with prolonged sitting.    Goals:     Turn over in bed without discomfort.                  Assessment  Impairments: abnormal coordination, abnormal muscle firing, abnormal muscle tone, abnormal or restricted ROM, impaired physical strength and pain with function  Symptom irritability: moderate    Assessment " details: Priya Johnston is a 38 y.o. female presenting to outpatient pelvic health physical therapy with referral from provider. Patient presents  at 33 weeks gestation with  Right sided low back pain without sciatica. Patient reports right lower back pain and anterior/lateral pelvic pain beginning around 20 weeks gestation. Patient's chief complaint is pain with turning over in bed and with prolonged sitting. Examination reveals delayed TrA (transverse abdominis) engagement, (+) TTP along PS, (+) testing for posterior pelvic girdle pain, adductor hypertonicity, and pain with transitional movements. S/S consistent with PS dysfunction and pelvic girdle pain. Patient will benefit from skilled PT services to address these deficits and improve function. Based on patients age and motivation,  patient is a positive candidate to respond favorably to physical therapy with a good prognosis.  Patient was educated on examination and techniques, plan of care, goals of therapy, and HEP. Patient was provided an opportunity to ask any questions. Provided Pt initial HEP. Patient demonstrated and verbalized understanding. Verbally reported to hold exercises if increased pain or discomfort.  Educated patient on the importance of monitoring BP. Pt will be seen 1-2x/week for 6-12 weeks. Patient will be re-assessed regularly in order to document progress and limit any regression. The goal of PT is to progress patient towards independence of self care management.     Understanding of Dx/Px/POC: good     Prognosis: good    Goals  In 2 weeks, patient will perform supine to sit with appropriate log roll mechanics without pain onset and appropriate core engagement  In 2 weeks, patient will sit for 30 minutes without pelvic pain onset  In 4 weeks, patient will reports 10% improvement in pelvic girdle questionnaire.   In 4 weeks, patient will improve side-lying hip abduction to 5/5 to assist with SLS and reduced falls risk  In 4 weeks,  patient will lift #15 lb off from the ground and display appropriate hip mechanics to limit future back pain  In 4 weeks, patient will display appropriate feeding mechanics to limit onset of neck and shoulder pain   In 6 weeks, patient will display appropriate carrying of child both in and out of the car sea  In 6 weeks, patient will display appropriate TrA engagement in sitting, quadraped, and standing for appropriate transitional movements.   n 6 weeks, patient display comfort with various MSK positions to assist with laboring without reports of pain            Pelvic Floor Subjective     Objective     Active Range of Motion     Lumbar   Flexion:  Restriction level: minimal  Extension:  Restriction level: minimal  Left rotation:  Restriction level: minimal  Right rotation:  Restriction level: minimal  Left Hip   External rotation (90/90): 27 degrees   Internal rotation (90/90): 13 degrees     Right Hip   External rotation (90/90): 38 degrees   Internal rotation (90/90): 15 degrees     Additional Active Range of Motion Details  Hamstring Mobility Supine 90 degrees hip flexion: Right LE 60 degrees from 90. Left LE 45 degrees from 90.     Strength/Myotome Testing     Left Hip   Planes of Motion   Abduction: 4- (sidelying - pain)    Right Hip   Planes of Motion   Abduction: 4- (sidelying - pain)    Additional Strength Details  Pain noted with sidelying hip abduction into posterior pelvic girdle.     Tests     Left Hip   Positive CARLITO and FADIR.     Right Hip   Positive CARLITO and FADIR.     Additional Tests Details  (+) thigh thrust B/L   (+) TTP Pubic Symphysis (with patient consent) - noticed reduced hip abduction ROM B/L ~30 degrees.   (+) response to soft tissue along B/L Adductors to assist with a reduction in pubic symphysis pain.     Functional Assessment        Comments  Log Roll: Reviewed - noticed reduced log rolling mechanics with bed mobility. Practiced in the clinic and patient demonstrated understanding.    Education: Using BP cuff at home to monitor for symptoms. Educated on S/S of Pre-eclampsia.       Abdominal Assessment:      Abdominal Assessment: Abdominal Doming Noted:  7 cm proximal to umbilicus and 2 cm wide.       Graphical documentation:                  Precautions: Patient is currently pregnant.     Chief Complaint Anterior and lateral pelvic pain. Hx of right sided sciatic symptoms. PS pain.    Patient Subjective includes:  Pain with transitional movements   Patient Goals        Objective Impairments to address   Pelvic Floor Strength    Power N/A   Endurance  N/A   Fast Twitch  N/A   % relaxation    External Hip Strength        Lumbar Screen          PLAN OF CARE EXPIRATION:     AUTHORIZATION EXPIRATION:     Date Authorization     Number of visits provided        Date 5/31/24        Visit Number 1        /84 - no s/s of malaise.         Neuro Re-Ed         Urge deferral         Breathing Mechanics         PFM Coordination         PFM Down Training                            Ther Ex         PFM Strengthening         Hip strengthening Hooklying HOB elevated hip abduction isometric  5'' x 12         Functional Strengthening         Dilator Training         Standing TrA progression Standing at wall with forearms:  3'' x 12    Standing with downward pressure on ball  3'' x 12     Sidelying TrA engagement  1 x 12                 Ther Activity         PFM Education POC, HEP, log roll mechanics to minimize abdominal doming.         Voiding Diaries          Defecation Mechanics                  Manual Ther         PFM exam         Ortho exam         PFM Manuals                           Modalities                           Patient Education         Fluid Intake

## 2024-06-03 ENCOUNTER — APPOINTMENT (OUTPATIENT)
Dept: PHYSICAL THERAPY | Facility: REHABILITATION | Age: 39
End: 2024-06-03
Payer: COMMERCIAL

## 2024-06-06 ENCOUNTER — ROUTINE PRENATAL (OUTPATIENT)
Dept: OBGYN CLINIC | Facility: CLINIC | Age: 39
End: 2024-06-06

## 2024-06-06 VITALS — DIASTOLIC BLOOD PRESSURE: 84 MMHG | WEIGHT: 208 LBS | BODY MASS INDEX: 30.72 KG/M2 | SYSTOLIC BLOOD PRESSURE: 132 MMHG

## 2024-06-06 DIAGNOSIS — O09.513 AMA (ADVANCED MATERNAL AGE) PRIMIGRAVIDA 35+, THIRD TRIMESTER: ICD-10-CM

## 2024-06-06 DIAGNOSIS — Z34.03 PRENATAL CARE, FIRST PREGNANCY IN THIRD TRIMESTER: Primary | ICD-10-CM

## 2024-06-06 PROCEDURE — PNV: Performed by: PHYSICIAN ASSISTANT

## 2024-06-11 ENCOUNTER — OFFICE VISIT (OUTPATIENT)
Dept: PHYSICAL THERAPY | Facility: REHABILITATION | Age: 39
End: 2024-06-11
Payer: COMMERCIAL

## 2024-06-11 DIAGNOSIS — M54.50 LEFT-SIDED LOW BACK PAIN WITHOUT SCIATICA, UNSPECIFIED CHRONICITY: Primary | ICD-10-CM

## 2024-06-11 DIAGNOSIS — O09.523 MULTIGRAVIDA OF ADVANCED MATERNAL AGE IN THIRD TRIMESTER: ICD-10-CM

## 2024-06-11 PROCEDURE — 97110 THERAPEUTIC EXERCISES: CPT

## 2024-06-11 NOTE — PROGRESS NOTES
Daily Note     Today's date: 2024  Patient name: Priya Johnston  : 1985  MRN: [id number]  Referring provider: Leigh Estrella, *  Dx:   Encounter Diagnosis     ICD-10-CM    1. Left-sided low back pain without sciatica, unspecified chronicity  M54.50       2. Multigravida of advanced maternal age in third trimester  O09.523                      Subjective: Patient reports positive response from exercises. At this time, patient reports she feels her baby is sitting lower.       Objective: See treatment diary below      Assessment: Tolerated treatment well. Progressed manual technique to assist with reducing PS pain today, along with further stabilization therex. Patient required verbal cueing for sidelying TrA activation and appropriate synchrony and coordination of breathing mechanics, hip strengthening, and deep core.  Introduced hinge  to assist with appropriate lifting (lifting of son when born). Patient tolerated well Observation during treatment noted (+) hinge mechanics with min VC required.  Overall, patient requires continued physical therapy services to assist with further reduction in PS pain, along with providing MSK positions for comfort . Home exercise program updated today, advising patient to discontinue with any increased pain or discomfort. Patient was agreeable.  Patient would benefit from continued PT      Plan: Continue per plan of care.      Precautions: Patient is currently pregnant.     Chief Complaint Anterior and lateral pelvic pain. Hx of right sided sciatic symptoms. PS pain.    Patient Subjective includes:  Pain with transitional movements   Patient Goals        Objective Impairments to address   Pelvic Floor Strength    Power N/A   Endurance  N/A   Fast Twitch  N/A   % relaxation    External Hip Strength        Lumbar Screen          PLAN OF CARE EXPIRATION:     AUTHORIZATION EXPIRATION:     Date Authorization     Number of visits provided        Date 24  06/11/2024       Visit Number 1 2       /84 - no s/s of malaise.  120/84 no s/s of malaise.        Neuro Re-Ed         Urge deferral         Breathing Mechanics         PFM Coordination         PFM Down Training                            Ther Ex         PFM Strengthening         Hip strengthening Hooklying HOB elevated hip abduction isometric  5'' x 12  Sidelying TrA + clamshell   1 x 10 B/L     Hooklying HOB elevated  Ball squeeze 25% hip ADDuction   5'' x 12     Hooklying HOB elevated hip ABDuction isometric with TrA + PFM engagement.  3'' x 12        Functional Strengthening  Mini squats with appropriate breathing mechanics    Higne mechanics at wall  1 x 15    Lifting item from 14 inches  #15 lb   1 x 12     Sit to stand with abduction isometric and PFM engagement prior to rise  1 x 15        Dilator Training         Standing TrA progression Standing at wall with forearms:  3'' x 12    Standing with downward pressure on ball  3'' x 12     Sidelying TrA engagement  1 x 12                 Ther Activity         PFM Education POC, HEP, log roll mechanics to minimize abdominal doming.         Voiding Diaries          Defecation Mechanics                  Manual Ther         PFM exam         Ortho exam         PFM Manuals         Soft tissue  B/L adductors to assist with reduction in pubic symphysis pain   8 minutes.                 Modalities                           Patient Education         Fluid Intake

## 2024-06-18 ENCOUNTER — OFFICE VISIT (OUTPATIENT)
Dept: PHYSICAL THERAPY | Facility: REHABILITATION | Age: 39
End: 2024-06-18
Payer: COMMERCIAL

## 2024-06-18 DIAGNOSIS — M54.50 LEFT-SIDED LOW BACK PAIN WITHOUT SCIATICA, UNSPECIFIED CHRONICITY: Primary | ICD-10-CM

## 2024-06-18 DIAGNOSIS — O09.523 MULTIGRAVIDA OF ADVANCED MATERNAL AGE IN THIRD TRIMESTER: ICD-10-CM

## 2024-06-18 PROCEDURE — 97110 THERAPEUTIC EXERCISES: CPT

## 2024-06-18 PROCEDURE — 97140 MANUAL THERAPY 1/> REGIONS: CPT

## 2024-06-18 NOTE — PROGRESS NOTES
"Daily Note     Today's date: 2024  Patient name: Priya Johnston  : 1985  MRN: [id number]  Referring provider: Leigh Estrella, *  Dx:   Encounter Diagnosis     ICD-10-CM    1. Left-sided low back pain without sciatica, unspecified chronicity  M54.50       2. Multigravida of advanced maternal age in third trimester  O09.523             Start Time: 1545  Stop Time: 1630  Total time in clinic (min): 45 minutes    Subjective: Presents with more discomfort this week.     Objective: See treatment diary below      Assessment: Tolerated treatment well. Due to symptoms introduced pregnancy pillow.  Performed fascia decompression and gentle hip stretching.  Also practiced \"hug the baby\" with all transfers.  Pt experienced some relief of symptoms with rebozo techniques (shared info). Is doing perineal massage, next session review labor and birth positions.    Patient would benefit from continued PT      Plan: Continue per plan of care.      Precautions: Patient is currently pregnant.     Chief Complaint Anterior and lateral pelvic pain. Hx of right sided sciatic symptoms. PS pain.    Patient Subjective includes:  Pain with transitional movements   Patient Goals        Objective Impairments to address   Pelvic Floor Strength    Power N/A   Endurance  N/A   Fast Twitch  N/A   % relaxation    External Hip Strength        Lumbar Screen          PLAN OF CARE EXPIRATION:     AUTHORIZATION EXPIRATION:     Date Authorization     Number of visits provided        Date 2024      Visit Number 1 2 3      /84 - no s/s of malaise.  120/84 no s/s of malaise.  130/78 after session      Neuro Re-Ed         Urge deferral         Breathing Mechanics         PFM Coordination         PFM Down Training                            Ther Ex         PFM Strengthening         Hip strengthening Hooklying HOB elevated hip abduction isometric  5'' x 12  Sidelying TrA + clamshell   1 x 10 B/L     Hooklying HOB " elevated  Ball squeeze 25% hip ADDuction   5'' x 12     Hooklying HOB elevated hip ABDuction isometric with TrA + PFM engagement.  3'' x 12  Sidelying TrA + clamshell   1 x 10 B/L     Hooklying HOB elevated  Ball squeeze 25% hip ADDuction   5'' x 12     Hooklying HOB elevated hip ABDuction isometric with TrA + PFM engagement.  3'' x 12       Functional Strengthening  Mini squats with appropriate breathing mechanics    Higne mechanics at wall  1 x 15    Lifting item from 14 inches  #15 lb   1 x 12     Sit to stand with abduction isometric and PFM engagement prior to rise  1 x 15  Squats w/ physio ball 2x10    Q-ped hip 2x10      Dilator Training         Standing TrA progression Standing at wall with forearms:  3'' x 12    Standing with downward pressure on ball  3'' x 12     Sidelying TrA engagement  1 x 12                 Ther Activity         PFM Education POC, HEP, log roll mechanics to minimize abdominal doming.         Voiding Diaries          Defecation Mechanics                  Manual Ther         PFM exam         Ortho exam         PFM Manuals         Soft tissue  B/L adductors to assist with reduction in pubic symphysis pain   8 minutes.  20' pregancy pillow  + rebozo               Modalities                           Patient Education         Fluid Intake

## 2024-06-19 ENCOUNTER — ROUTINE PRENATAL (OUTPATIENT)
Dept: OBGYN CLINIC | Facility: CLINIC | Age: 39
End: 2024-06-19

## 2024-06-19 VITALS — WEIGHT: 209 LBS | BODY MASS INDEX: 30.86 KG/M2 | SYSTOLIC BLOOD PRESSURE: 122 MMHG | DIASTOLIC BLOOD PRESSURE: 80 MMHG

## 2024-06-19 DIAGNOSIS — O09.513 AMA (ADVANCED MATERNAL AGE) PRIMIGRAVIDA 35+, THIRD TRIMESTER: ICD-10-CM

## 2024-06-19 DIAGNOSIS — Z36.9 ANTENATAL SCREENING ENCOUNTER: ICD-10-CM

## 2024-06-19 DIAGNOSIS — Z34.03 PRENATAL CARE, FIRST PREGNANCY IN THIRD TRIMESTER: Primary | ICD-10-CM

## 2024-06-19 PROCEDURE — 87491 CHLMYD TRACH DNA AMP PROBE: CPT | Performed by: PHYSICIAN ASSISTANT

## 2024-06-19 PROCEDURE — 87150 DNA/RNA AMPLIFIED PROBE: CPT | Performed by: PHYSICIAN ASSISTANT

## 2024-06-19 PROCEDURE — 87591 N.GONORRHOEAE DNA AMP PROB: CPT | Performed by: PHYSICIAN ASSISTANT

## 2024-06-19 PROCEDURE — PNV: Performed by: PHYSICIAN ASSISTANT

## 2024-06-19 NOTE — PROGRESS NOTES
38 y.o.  female at 36w4d (Estimated Date of Delivery: 24) for PNV.    Pre-Shilo Vitals      Flowsheet Row Most Recent Value   Prenatal Assessment    Movement Present   Prenatal Vitals    Blood Pressure 122/80   Weight - Scale 94.8 kg (209 lb)   Urine Albumin/Glucose    Dilation/Effacement/Station    Vaginal Drainage    Edema           TW.35 kg (14 lb)    Leakage of fluid: no  Vaginal bleeding: no  Contractions/Cramping: no  Fetal movement: yes  Overall well  Some b/l LE swelling  Advised hydrate, elevate  GBS done today    RTO in 1 weeks.

## 2024-06-19 NOTE — PROGRESS NOTES
GBS and third trimester gc/chlam testing due today.   Patient states she has been noticing a lot more swelling hands and feet and is wondering if there is anything g she should be doing for it. Urine dip neg/neg.

## 2024-06-20 LAB
C TRACH DNA SPEC QL NAA+PROBE: NEGATIVE
N GONORRHOEA DNA SPEC QL NAA+PROBE: NEGATIVE

## 2024-06-21 LAB — GP B STREP DNA SPEC QL NAA+PROBE: NEGATIVE

## 2024-06-25 ENCOUNTER — APPOINTMENT (OUTPATIENT)
Dept: PHYSICAL THERAPY | Facility: REHABILITATION | Age: 39
End: 2024-06-25
Payer: COMMERCIAL

## 2024-06-26 ENCOUNTER — HOSPITAL ENCOUNTER (INPATIENT)
Facility: HOSPITAL | Age: 39
LOS: 4 days | Discharge: HOME | End: 2024-06-30
Attending: OBSTETRICS & GYNECOLOGY | Admitting: OBSTETRICS & GYNECOLOGY
Payer: COMMERCIAL

## 2024-06-26 ENCOUNTER — ROUTINE PRENATAL (OUTPATIENT)
Dept: OBGYN CLINIC | Facility: CLINIC | Age: 39
End: 2024-06-26

## 2024-06-26 VITALS — DIASTOLIC BLOOD PRESSURE: 90 MMHG | SYSTOLIC BLOOD PRESSURE: 142 MMHG | BODY MASS INDEX: 31.01 KG/M2 | WEIGHT: 210 LBS

## 2024-06-26 DIAGNOSIS — O14.13 SEVERE PRE-ECLAMPSIA IN THIRD TRIMESTER: Primary | ICD-10-CM

## 2024-06-26 DIAGNOSIS — O16.3 ELEVATED BLOOD PRESSURE AFFECTING PREGNANCY IN THIRD TRIMESTER, ANTEPARTUM: Primary | ICD-10-CM

## 2024-06-26 DIAGNOSIS — O09.513 AMA (ADVANCED MATERNAL AGE) PRIMIGRAVIDA 35+, THIRD TRIMESTER: ICD-10-CM

## 2024-06-26 DIAGNOSIS — Z3A.37 37 WEEKS GESTATION OF PREGNANCY: ICD-10-CM

## 2024-06-26 PROBLEM — O13.9 GESTATIONAL HYPERTENSION: Status: ACTIVE | Noted: 2024-06-26

## 2024-06-26 PROBLEM — Z34.90 ENCOUNTER FOR INDUCTION OF LABOR: Status: ACTIVE | Noted: 2024-06-26

## 2024-06-26 LAB
ABO GROUP BLD: NORMAL
ALBUMIN SERPL BCG-MCNC: 3.7 G/DL (ref 3.5–5)
ALP SERPL-CCNC: 230 U/L (ref 34–104)
ALT SERPL W P-5'-P-CCNC: 13 U/L (ref 7–52)
ANION GAP SERPL CALCULATED.3IONS-SCNC: 7 MMOL/L (ref 4–13)
AST SERPL W P-5'-P-CCNC: 20 U/L (ref 13–39)
BILIRUB SERPL-MCNC: 0.31 MG/DL (ref 0.2–1)
BLD GP AB SCN SERPL QL: NEGATIVE
BUN SERPL-MCNC: 9 MG/DL (ref 5–25)
CALCIUM SERPL-MCNC: 10.7 MG/DL (ref 8.4–10.2)
CHLORIDE SERPL-SCNC: 103 MMOL/L (ref 96–108)
CO2 SERPL-SCNC: 25 MMOL/L (ref 21–32)
CREAT SERPL-MCNC: 0.86 MG/DL (ref 0.6–1.3)
CREAT UR-MCNC: 49.5 MG/DL
ERYTHROCYTE [DISTWIDTH] IN BLOOD BY AUTOMATED COUNT: 14.4 % (ref 11.6–15.1)
GFR SERPL CREATININE-BSD FRML MDRD: 87 ML/MIN/1.73SQ M
GLUCOSE SERPL-MCNC: 91 MG/DL (ref 65–140)
HCT VFR BLD AUTO: 39 % (ref 34.8–46.1)
HGB BLD-MCNC: 12.9 G/DL (ref 11.5–15.4)
MCH RBC QN AUTO: 30.1 PG (ref 26.8–34.3)
MCHC RBC AUTO-ENTMCNC: 33.1 G/DL (ref 31.4–37.4)
MCV RBC AUTO: 91 FL (ref 82–98)
PLATELET # BLD AUTO: 221 THOUSANDS/UL (ref 149–390)
PMV BLD AUTO: 10.5 FL (ref 8.9–12.7)
POTASSIUM SERPL-SCNC: 3.5 MMOL/L (ref 3.5–5.3)
PROT SERPL-MCNC: 6.3 G/DL (ref 6.4–8.4)
PROT UR-MCNC: 8 MG/DL
PROT/CREAT UR: 0.16 MG/G{CREAT} (ref 0–0.1)
RBC # BLD AUTO: 4.28 MILLION/UL (ref 3.81–5.12)
RH BLD: POSITIVE
SODIUM SERPL-SCNC: 135 MMOL/L (ref 135–147)
SPECIMEN EXPIRATION DATE: NORMAL
WBC # BLD AUTO: 14.35 THOUSAND/UL (ref 4.31–10.16)

## 2024-06-26 PROCEDURE — 4A1HXCZ MONITORING OF PRODUCTS OF CONCEPTION, CARDIAC RATE, EXTERNAL APPROACH: ICD-10-PCS | Performed by: OBSTETRICS & GYNECOLOGY

## 2024-06-26 PROCEDURE — 86780 TREPONEMA PALLIDUM: CPT

## 2024-06-26 PROCEDURE — 86900 BLOOD TYPING SEROLOGIC ABO: CPT

## 2024-06-26 PROCEDURE — PNV: Performed by: OBSTETRICS & GYNECOLOGY

## 2024-06-26 PROCEDURE — 84156 ASSAY OF PROTEIN URINE: CPT

## 2024-06-26 PROCEDURE — NC001 PR NO CHARGE: Performed by: OBSTETRICS & GYNECOLOGY

## 2024-06-26 PROCEDURE — 86901 BLOOD TYPING SEROLOGIC RH(D): CPT

## 2024-06-26 PROCEDURE — 80053 COMPREHEN METABOLIC PANEL: CPT

## 2024-06-26 PROCEDURE — 86850 RBC ANTIBODY SCREEN: CPT

## 2024-06-26 PROCEDURE — 85027 COMPLETE CBC AUTOMATED: CPT

## 2024-06-26 PROCEDURE — 82570 ASSAY OF URINE CREATININE: CPT

## 2024-06-26 RX ORDER — PREDNISONE 20 MG/1
40 TABLET ORAL DAILY
Status: COMPLETED | OUTPATIENT
Start: 2024-06-27 | End: 2024-06-27

## 2024-06-26 RX ORDER — CALCIUM CARBONATE 500 MG/1
1000 TABLET, CHEWABLE ORAL 3 TIMES DAILY PRN
Status: DISCONTINUED | OUTPATIENT
Start: 2024-06-26 | End: 2024-06-28

## 2024-06-26 RX ORDER — SODIUM CHLORIDE, SODIUM LACTATE, POTASSIUM CHLORIDE, CALCIUM CHLORIDE 600; 310; 30; 20 MG/100ML; MG/100ML; MG/100ML; MG/100ML
50 INJECTION, SOLUTION INTRAVENOUS CONTINUOUS
Status: DISCONTINUED | OUTPATIENT
Start: 2024-06-26 | End: 2024-06-28

## 2024-06-26 RX ORDER — ACETAMINOPHEN 325 MG/1
975 TABLET ORAL EVERY 8 HOURS PRN
Status: DISCONTINUED | OUTPATIENT
Start: 2024-06-26 | End: 2024-06-26

## 2024-06-26 RX ORDER — METOCLOPRAMIDE HYDROCHLORIDE 5 MG/ML
10 INJECTION INTRAMUSCULAR; INTRAVENOUS ONCE
Status: COMPLETED | OUTPATIENT
Start: 2024-06-26 | End: 2024-06-26

## 2024-06-26 RX ORDER — ACETAMINOPHEN 325 MG/1
975 TABLET ORAL EVERY 8 HOURS PRN
Status: DISCONTINUED | OUTPATIENT
Start: 2024-06-26 | End: 2024-06-28

## 2024-06-26 RX ORDER — DIPHENHYDRAMINE HYDROCHLORIDE 50 MG/ML
25 INJECTION INTRAMUSCULAR; INTRAVENOUS ONCE
Status: COMPLETED | OUTPATIENT
Start: 2024-06-26 | End: 2024-06-26

## 2024-06-26 RX ORDER — ONDANSETRON 2 MG/ML
4 INJECTION INTRAMUSCULAR; INTRAVENOUS EVERY 6 HOURS PRN
Status: DISCONTINUED | OUTPATIENT
Start: 2024-06-26 | End: 2024-06-28

## 2024-06-26 RX ORDER — BUPIVACAINE HYDROCHLORIDE 2.5 MG/ML
30 INJECTION, SOLUTION EPIDURAL; INFILTRATION; INTRACAUDAL ONCE AS NEEDED
Status: DISCONTINUED | OUTPATIENT
Start: 2024-06-26 | End: 2024-06-28

## 2024-06-26 RX ORDER — PREDNISONE 20 MG/1
20 TABLET ORAL DAILY
Status: DISCONTINUED | OUTPATIENT
Start: 2024-06-28 | End: 2024-06-28

## 2024-06-26 RX ADMIN — DIPHENHYDRAMINE HYDROCHLORIDE 25 MG: 50 INJECTION, SOLUTION INTRAMUSCULAR; INTRAVENOUS at 21:50

## 2024-06-26 RX ADMIN — METOCLOPRAMIDE 10 MG: 5 INJECTION, SOLUTION INTRAMUSCULAR; INTRAVENOUS at 21:50

## 2024-06-26 RX ADMIN — ACETAMINOPHEN 975 MG: 325 TABLET, FILM COATED ORAL at 21:36

## 2024-06-26 NOTE — PROGRESS NOTES
38 y.o.  female at 37w4d (Estimated Date of Delivery: 24) for PNV.    Pre-Shilo Vitals      Flowsheet Row Most Recent Value   Prenatal Assessment    Fetal Heart Rate 152   Movement Present   Prenatal Vitals    Blood Pressure 142/90   Weight - Scale 95.3 kg (210 lb)   Urine Albumin/Glucose    Dilation/Effacement/Station    Cervical Dilation 0   Cervical Effacement 0   Fetal Station -3   Vaginal Drainage    Edema           TW.804 kg (15 lb)    Leakage of fluid: no  Vaginal bleeding: no  Contractions/Cramping: no  Fetal movement: yes  Elevated BP in office -- 142/90. Repeat after 15 minutes was still elevated.    -Discussed with patient hypertensive disorders in pregnancy which include gestational hypertension.  Patient has not had elevated pressures during this pregnancy until now.  We discussed the development of gestational hypertension later in pregnancy and the recommendation for delivery if that is being developed after 37 weeks.  We discussed diagnostic criteria for gestational hypertension and recommendation for evaluation on labor and delivery for preeclampsia with lab work and urine protein/creatinine ratio assessment.  We discussed that if she is released from labor and delivery today I would recommend repeat blood pressure check on Friday before the weekend.  We discussed that if that is elevated, recommendation will be for delivery and induction on Friday is recommended.  We reviewed induction of labor procedures discussing cervical ripening including use of Franks balloon and Cytotec as well as Pitocin infusion and titration for induction of labor.  Patient and FOB expressed understanding of all that was discussed.    RTO in 2 days if released from L&D after evaluation. BP check scheduled for 8AM on 2024.

## 2024-06-26 NOTE — ASSESSMENT & PLAN NOTE
Patient met criteria in triage with mild range blood pressures q4h apart, then met criteria for pre-eclampsia with severe features due to persistent headache  S/p 12 hours of Mag gtt  Asymptomatic at this time  CBC/CMP wnl  UPC 0.16  Systolic (12hrs), Av , Min:115 , Max:133   Diastolic (12hrs), Av, Min:67, Max:84

## 2024-06-26 NOTE — PROGRESS NOTES
Undressed for cervical check.   Pt would like to discuss prednisone and heart burn suggestions. Denies vb,lof,and ctx.   Swelling present in the lower extremities     Bp 142/90

## 2024-06-26 NOTE — PROGRESS NOTES
38 y.o.  female at 37w4d (Estimated Date of Delivery: 24) for PNV.    Pre-Shilo Vitals      Flowsheet Row Most Recent Value   Prenatal Assessment    Movement Present   Prenatal Vitals    Blood Pressure 132/84   Weight - Scale 94.3 kg (208 lb)   Urine Albumin/Glucose    Dilation/Effacement/Station    Vaginal Drainage    Edema           TW.897 kg (13 lb)    Leakage of fluid: no  Vaginal bleeding: no  Contractions/Cramping: no  Fetal movement: yes  Feeling well  GBS next visit    RTO in 1 weeks.     
Priya Johnston is 57cgv3i, here for her routine ob appt; pt denies any LOF,  VB, or CTXs.    +FM?: yes         UA neg/neg  
Statement Selected

## 2024-06-26 NOTE — H&P
H & P- Obstetrics   Priya Johnston 38 y.o. female MRN: [id number]  Unit/Bed#: LD TRIAGE 3 Encounter: [id number]      Assessment/Plan:    Priya is a 38 y.o.  at 37w4d presenting from prenatal visit for evaluation in the setting of elevated BP in the office. BP was 142/90. This is the first documented elevated blood pressure in this pregnancy. She denies headache, vision changes, RUQ pain, LE pain. She endorses mild swelling in ankles and hands that resolves with rest. She met criteria for gestational hypertension with elevated blood pressures 4 hours apart. CBC/CMP wnl. UPC 0.16. She is being admitted for an induction of labor with the indication of gestational hypertension at term.      SVE: Cervical Dilation: 1  Cervical Effacement: 50  Cervical Consistency: Firm  Fetal Station: Ballotable  Presentation: Vertex  Position: Unknown  Method: Manual  OB Examiner: Avi    37 weeks gestation of pregnancy  Assessment & Plan  Pt has received routine prenatal care   EFW: 27% at 32 weeks  Negative glucola     AMA (advanced maternal age) primigravida 35+, third trimester  Assessment & Plan  NIPS is low risk    Migraine with aura and without status migrainosus, not intractable  Assessment & Plan  Currently on prednisone for migraine flare  Denies current headache or vision changes    * Gestational hypertension  Assessment & Plan  Patient met criteria in triage with mild range blood pressures q4h apart  CBC/CMP wnl  UPC 0.16  Plan for induction  - Continue to monitor blood pressures and for signs/symptoms of preeclampsia  - Cephalic by ultrasound. Clinical EFW by Leopold's: 8lbs  - GBS negative status  - Plan for duron balloon/cytotec   - Follow up CBC, Syphilis screening, blood type & antibody screen         Patient of: [facility]  This patient will be an INPATIENT  and I certify the anticipated length of stay is >2 Midnights  Discussed with Dr. Josue      SUBJECTIVE:    Chief Complaint: My  blood pressure was high in the office.     HPI: Priay Johnston is a 38 y.o.  with an RAMILA of 2024, by Last Menstrual Period at 37w4d who is being admitted for induction of labor in the setting of newly diagnosed gestational hypertension. She presented from prenatal visit for further evaluation in the setting of elevated BP. She had a BP of 142/90. First elevated BP of this pregnancy. She denies headache, vision changes, RUQ pain, chest pain, SOB, abdominal pain. Reports mild ankle swelling that improves with rest. She has felt that her hands have become more swollen, but improve with movement. She denies having uterine contractions, has no LOF, and reports no VB. She states that the baby moves as usual. This pregnancy is complicated by AMA, uterine incarceration. All other review of systems is negative.       Pregnancy Plan:  Pregnancy: Lan  Fetal sex: Male  Support person: Venancio Johnston     Delivery Plans  Planned delivery method: Vaginal  Planned anesthesia: Epidural  Acceptable blood products: All     Post-Delivery Plans  Feeding intentions: Breast Milk  Circumcision requested: No      Patient Active Problem List   Diagnosis    Migraine with aura and without status migrainosus, not intractable    Nausea/vomiting in pregnancy    Incarcerated gravid uterus in first trimester    Anxiety during pregnancy, antepartum, second trimester    AMA (advanced maternal age) primigravida 35+, third trimester    37 weeks gestation of pregnancy    Gestational hypertension    Encounter for induction of labor       OB History    Para Term  AB Living   2       1     SAB IAB Ectopic Multiple Live Births     1            # Outcome Date GA Lbr [place]/2nd Weight Sex Type Anes PTL Lv   2 Current            1 IAB               Obstetric Comments   Menarche 16       Past Medical History:   Diagnosis Date    Cluster headache 2009    Headache, tension-type 2005    Migraine 2009    Varicella      disease       Past Surgical History:   Procedure Laterality Date    WISDOM TOOTH EXTRACTION N/A 2007       Social History     Tobacco Use    Smoking status: Never    Smokeless tobacco: Never   Substance Use Topics    Alcohol use: Not Currently     Alcohol/week: 2.0 standard drinks of alcohol     Types: 2 Cans of beer per week     Comment: socially       No Known Allergies      Medications Prior to Admission:     aspirin (ECOTRIN LOW STRENGTH) 81 mg EC tablet    MAGNESIUM PO    predniSONE 20 mg tablet    Prenatal Vit-Fe Fumarate-FA (PRENATAL PO)    Riboflavin (VITAMIN B-2 PO)    sertraline (ZOLOFT) 50 mg tablet      OBJECTIVE:  Vitals:  Temp:  [99.1 °F (37.3 °C)] 99.1 °F (37.3 °C)  HR:  [76-87] 79  Resp:  [18] 18  BP: (133-148)/(65-90) 134/83  There is no height or weight on file to calculate BMI.     Physical Exam:  General: Well appearing, no distress  Respiratory: Unlabored breathing, clear to auscultation bilaterally  Cardiovascular: Regular rate and rhythm  Abdomen: Soft, gravid, nontender  Fundal Height: Appropriate for gestational age.  Extremities: Warm and well perfused.  Non tender.  Psychiatric: Behavioral normal    FHT:  Baseline Rate (FHR): 140 bpm  Variability: Moderate  Accelerations: 15 x 15 or greater  Decelerations: None  NST reactive    TOCO:   Contraction Frequency (minutes): 2-7  Contraction Duration (seconds): 40-60  Contraction Intensity: Mild      Prenatal Labs: I have personally reviewed pertinent reports.  , Blood Type:   Lab Results   Component Value Date/Time    ABO Grouping O 12/15/2023 12:43 PM     , D (Rh type):   Lab Results   Component Value Date/Time    Rh Factor Positive 12/15/2023 12:43 PM     , Antibody Screen: Negative     HCT/HGB:   Lab Results   Component Value Date/Time    Hematocrit 39.0 06/26/2024 02:41 PM    Hemoglobin 12.9 06/26/2024 02:41 PM      , MCV:   Lab Results   Component Value Date/Time    MCV 91 06/26/2024 02:41 PM      , Platelets:   Lab Results   Component  "Value Date/Time    Platelets 221 06/26/2024 02:41 PM      , 1 hour Glucola:   Lab Results   Component Value Date/Time    Glucose 128 04/13/2024 09:02 AM   , Varicella:   Lab Results   Component Value Date/Time    Varicella IgG NON-IMMUNE (A) 12/15/2023 12:43 PM       , Rubella:   Lab Results   Component Value Date/Time    Rubella IgG Quant <10.0 (L) 12/15/2023 12:43 PM        , VDRL/RPR: Non-reactive     , Urine Culture/Screen:   Lab Results   Component Value Date/Time    Urine Culture 10,000-19,000 cfu/ml 03/04/2024 08:05 AM       , Hep B:   Lab Results   Component Value Date/Time    Hepatitis B Surface Ag Non-reactive 12/15/2023 12:43 PM     , Hep C:Non-reactive    , HIV: Non-reactive    , Chlamydia: Negative    , Gonorrhea:   Lab Results   Component Value Date/Time    N gonorrhoeae, DNA Probe Negative 06/19/2024 01:21 PM     , Group B Strep:    Lab Results   Component Value Date/Time    Strep Grp B PCR Negative 06/19/2024 01:21 PM            Jessica Cárdenas MD  6/26/2024  6:43 PM        Portions of the record may have been created with voice recognition software.  Occasional wrong word or \"sound a like\" substitutions may have occurred due to the inherent limitations of voice recognition software.  Read the chart carefully and recognize, using context, where substitutions have occurred    "

## 2024-06-27 ENCOUNTER — APPOINTMENT (OUTPATIENT)
Dept: PHYSICAL THERAPY | Facility: REHABILITATION | Age: 39
End: 2024-06-27
Payer: COMMERCIAL

## 2024-06-27 LAB — TREPONEMA PALLIDUM IGG+IGM AB [PRESENCE] IN SERUM OR PLASMA BY IMMUNOASSAY: NORMAL

## 2024-06-27 PROCEDURE — 96374 THER/PROPH/DIAG INJ IV PUSH: CPT

## 2024-06-27 PROCEDURE — 99205 OFFICE O/P NEW HI 60 MIN: CPT

## 2024-06-27 PROCEDURE — 3E033VJ INTRODUCTION OF OTHER HORMONE INTO PERIPHERAL VEIN, PERCUTANEOUS APPROACH: ICD-10-PCS | Performed by: OBSTETRICS & GYNECOLOGY

## 2024-06-27 RX ORDER — CALCIUM GLUCONATE 94 MG/ML
1 INJECTION, SOLUTION INTRAVENOUS ONCE AS NEEDED
Status: DISCONTINUED | OUTPATIENT
Start: 2024-06-27 | End: 2024-06-28

## 2024-06-27 RX ORDER — METOCLOPRAMIDE HYDROCHLORIDE 5 MG/ML
5 INJECTION INTRAMUSCULAR; INTRAVENOUS EVERY 6 HOURS PRN
Status: DISCONTINUED | OUTPATIENT
Start: 2024-06-27 | End: 2024-06-28

## 2024-06-27 RX ORDER — MAGNESIUM SULFATE HEPTAHYDRATE 40 MG/ML
2 INJECTION, SOLUTION INTRAVENOUS CONTINUOUS
Status: DISCONTINUED | OUTPATIENT
Start: 2024-06-27 | End: 2024-06-29

## 2024-06-27 RX ORDER — OXYTOCIN/RINGER'S LACTATE 30/500 ML
1-30 PLASTIC BAG, INJECTION (ML) INTRAVENOUS
Status: DISCONTINUED | OUTPATIENT
Start: 2024-06-27 | End: 2024-06-28

## 2024-06-27 RX ORDER — MAGNESIUM SULFATE HEPTAHYDRATE 40 MG/ML
2 INJECTION, SOLUTION INTRAVENOUS ONCE
Status: COMPLETED | OUTPATIENT
Start: 2024-06-27 | End: 2024-06-27

## 2024-06-27 RX ORDER — MAGNESIUM SULFATE HEPTAHYDRATE 40 MG/ML
2 INJECTION, SOLUTION INTRAVENOUS ONCE
Status: COMPLETED | OUTPATIENT
Start: 2024-06-27 | End: 2024-06-28

## 2024-06-27 RX ORDER — MAGNESIUM SULFATE HEPTAHYDRATE 40 MG/ML
4 INJECTION, SOLUTION INTRAVENOUS ONCE
Status: COMPLETED | OUTPATIENT
Start: 2024-06-27 | End: 2024-06-28

## 2024-06-27 RX ADMIN — ACETAMINOPHEN 975 MG: 325 TABLET, FILM COATED ORAL at 10:10

## 2024-06-27 RX ADMIN — ACETAMINOPHEN 975 MG: 325 TABLET, FILM COATED ORAL at 18:38

## 2024-06-27 RX ADMIN — SERTRALINE HYDROCHLORIDE 50 MG: 50 TABLET ORAL at 10:07

## 2024-06-27 RX ADMIN — OXYTOCIN 2 MILLI-UNITS/MIN: 10 INJECTION INTRAVENOUS at 18:21

## 2024-06-27 RX ADMIN — SODIUM CHLORIDE, SODIUM LACTATE, POTASSIUM CHLORIDE, AND CALCIUM CHLORIDE 50 ML/HR: .6; .31; .03; .02 INJECTION, SOLUTION INTRAVENOUS at 23:40

## 2024-06-27 RX ADMIN — METOCLOPRAMIDE 5 MG: 5 INJECTION, SOLUTION INTRAMUSCULAR; INTRAVENOUS at 16:59

## 2024-06-27 RX ADMIN — MAGNESIUM SULFATE HEPTAHYDRATE 4 G: 40 INJECTION, SOLUTION INTRAVENOUS at 23:42

## 2024-06-27 RX ADMIN — PREDNISONE 40 MG: 20 TABLET ORAL at 10:07

## 2024-06-27 RX ADMIN — MAGNESIUM SULFATE HEPTAHYDRATE 2 G: 40 INJECTION, SOLUTION INTRAVENOUS at 16:55

## 2024-06-27 RX ADMIN — METOCLOPRAMIDE 5 MG: 5 INJECTION, SOLUTION INTRAMUSCULAR; INTRAVENOUS at 23:15

## 2024-06-27 NOTE — QUICK NOTE
Due to acuity of the floor, patient's induction has not been able to be started as a labor room and labor nurse are not available.  Her vitals have been stable with intermittent mildly elevated blood pressures but nothing severe.  She remains asymptomatic for signs and symptoms of preeclampsia.  She has had an NST every 1-2 hours all of which have been reactive with no concerns.  Acuity of the floor still high and it appears as though patient's induction will not be able to be started until the morning.  For both patient's safety as well as patient comfort, patient will be moved upstairs to the antepartum floor's.  Due to multiple hours of stability of both mom and baby, monitoring of both will be spaced.  Her next NST will be for 8 AM this morning.  We will do normal pregnancy-induced hypertensive vital checks of every 4 hours.  All of this can change if clinical status of mom or baby indicates closer monitoring.  Patient will be brought back downstairs and her induction will be started as soon as possible.    Discussed with charge nurse, nurse manager, Dr. Josue.  All are in agreement.

## 2024-06-27 NOTE — UTILIZATION REVIEW
Initial Clinical Review    Admission: Date/Time/Statement:   Admission Orders (From admission, onward)       Ordered        06/26/24 1832  Inpatient Admission  Once                          Orders Placed This Encounter   Procedures    Inpatient Admission     Standing Status:   Standing     Number of Occurrences:   1     Order Specific Question:   Level of Care     Answer:   Med Surg [16]     Order Specific Question:   Estimated length of stay     Answer:   More than 2 Midnights     Order Specific Question:   Certification     Answer:   I certify that inpatient services are medically necessary for this patient for a duration of greater than two midnights. See H&P and MD Progress Notes for additional information about the patient's course of treatment.       Chief Complaint   Patient presents with    Hypertension       Initial Presentation: 38 y.o. female presented to L&D as inpatient admission for elevated BP's G 2 P 0 @ 37 4/7 weeks.T his is the first documented elevated blood pressure in this pregnancy. She denies headache, vision changes, RUQ pain, LE pain. She endorses mild swelling in ankles and hands that resolves with rest. She met criteria for gestational hypertension with elevated blood pressures 4 hours apart. Plan monitor BP's continuous external monitoring state IOL 06-27-24  FHT:  Baseline Rate (FHR): 140 bpm  Variability: Moderate  Accelerations: 15 x 15 or greater  Decelerations: None  NST reactive     TOCO:   Contraction Frequency (minutes): 2-7  Contraction Duration (seconds): 40-60  Contraction Intensity: Mild         Date: 06-27-24   Day 2: will start IOL  @0940  Franks balloon inserted   Contraction Frequency (minutes): 2-5.5  Contraction Intensity: Mild  Uterine Activity Characteristics: Irregular  Cervical Dilation: 1  Cervical Effacement: 50  Fetal Station: Ballotable  Baseline Rate (FHR): 155 bpm  Fetal Heart Rate (FHT): 150 BPM      OB Triage Vitals   Temperature Pulse Respirations Blood  Pressure SpO2 Pain Score   06/26/24 1443 06/26/24 1456 06/26/24 1456 06/26/24 1456 06/26/24 1443 06/26/24 1456   99.1 °F (37.3 °C) 83 18 133/84 98 % No Pain     Weight (last 2 days)       Date/Time    06/27/24 0131    Comment rows:    OBSERV: pt allowed to rest at this time, next NST at 0800 per Dr. Cárdenas's note. Q4VS for GHTN at 06/27/24 0131            Vital Signs (last 3 days)       Date/Time Temp Pulse Resp BP SpO2 Patient Position - Orthostatic VS Pain    06/27/24 1256 97.5 °F (36.4 °C) 75 18 134/74 -- Sitting 2    06/27/24 1010 -- -- -- -- -- -- 8    06/27/24 0844 97.9 °F (36.6 °C) 95 18 141/84 -- Lying 8    06/27/24 0529 98.3 °F (36.8 °C) 64 18 139/75 98 % Lying No Pain    06/27/24 0131 -- -- -- -- -- -- --    OBSERV: pt allowed to rest at this time, next NST at 0800 per Dr. Cárdenas's note. Q4VS for GHTN at 06/27/24 0131    06/27/24 0100 -- 74 -- 140/79 -- -- --    06/26/24 2328 -- 86 -- 144/78 -- -- --    06/26/24 2230 -- 77 -- 135/83 -- -- --    06/26/24 2136 -- -- -- -- -- -- 9    06/26/24 2103 -- 67 -- 137/74 -- -- --    06/26/24 1830 -- 86 -- 138/81 -- -- --    06/26/24 1815 -- 81 -- 140/79 -- -- --    06/26/24 1800 -- 85 -- 154/87 -- -- --    06/26/24 1745 -- 88 -- 141/87 -- -- --    06/26/24 1730 -- 79 -- 134/83 -- -- --    06/26/24 1715 -- 82 -- 145/87 -- -- --    06/26/24 1700 -- 87 -- 141/89 -- -- --    06/26/24 1645 -- 83 -- 144/85 -- -- --    06/26/24 1630 -- 83 -- 147/90 -- -- --    06/26/24 1615 -- 86 -- 140/84 -- -- --    06/26/24 1556 -- 82 -- 143/85 -- -- --    06/26/24 1541 -- 82 -- 138/84 -- -- --    06/26/24 1526 -- 77 -- 143/85 -- -- --    06/26/24 1511 -- 76 -- 148/65 -- -- --    06/26/24 1456 -- 83 18 133/84 -- Sitting No Pain    06/26/24 1443 99.1 °F (37.3 °C) -- -- -- 98 % -- --              Pertinent Labs/Diagnostic Test Results:     Results from last 7 days   Lab Units 06/26/24  1441   WBC Thousand/uL 14.35*   HEMOGLOBIN g/dL 12.9   HEMATOCRIT % 39.0   PLATELETS Thousands/uL 221          Results from last 7 days   Lab Units 06/26/24  1441   SODIUM mmol/L 135   POTASSIUM mmol/L 3.5   CHLORIDE mmol/L 103   CO2 mmol/L 25   ANION GAP mmol/L 7   BUN mg/dL 9   CREATININE mg/dL 0.86   EGFR ml/min/1.73sq m 87   CALCIUM mg/dL 10.7*     Results from last 7 days   Lab Units 06/26/24  1441   AST U/L 20   ALT U/L 13   ALK PHOS U/L 230*   TOTAL PROTEIN g/dL 6.3*   ALBUMIN g/dL 3.7   TOTAL BILIRUBIN mg/dL 0.31         Results from last 7 days   Lab Units 06/26/24  1441   GLUCOSE RANDOM mg/dL 91       Results from last 7 days   Lab Units 06/26/24  1441   CREATININE UR mg/dL 49.5   PROTEIN UR mg/dL 8   PROT/CREAT RATIO UR  0.16*            Past Medical History:   Diagnosis Date    Cluster headache August 2009    Headache, tension-type August 2005    Migraine August 2009    Varicella     disease     Present on Admission:   Migraine with aura and without status migrainosus, not intractable   AMA (advanced maternal age) primigravida 35+, third trimester      Admitting Diagnosis: Preeclampsia [O14.90]  Age/Sex: 38 y.o. female  Admission Orders:  Scheduled Medications:  [START ON 6/28/2024] predniSONE, 20 mg, Oral, Daily  sertraline, 50 mg, Oral, Daily      Continuous IV Infusions:  lactated ringers, 125 mL/hr, Intravenous, Continuous      PRN Meds:  acetaminophen, 975 mg, Oral, Q8H PRN  bupivacaine (PF), 30 mL, Infiltration, Once PRN  calcium carbonate, 1,000 mg, Oral, TID PRN  ondansetron, 4 mg, Intravenous, Q6H PRN        None    Network Utilization Review Department  ATTENTION: Please call with any questions or concerns to [phone] and carefully listen to the prompts so that you are directed to the right person. All voicemails are confidential.   For Discharge needs, contact Care Management DC Support Team at [phone] opt. 2  Send all requests for admission clinical reviews, approved or denied determinations and any other requests to dedicated fax number below belonging to the campus where the patient is  receiving treatment. List of dedicated fax numbers for the Facilities:  FACILITY NAME UR FAX NUMBER   ADMISSION DENIALS (Administrative/Medical Necessity) [phone]   DISCHARGE SUPPORT TEAM (NETWORK) [phone]   PARENT CHILD HEALTH (Maternity/NICU/Pediatrics) [phone]   [facility] [phone]   [facility] [phone]   [facility] [phone]   [facility] [phone]   [facility] [phone]   [facility] [phone]   [facility] [phone]   [facility] [phone]   [facility] [phone]   [facility] [phone]   [facility] [phone]   [facility] [phone]

## 2024-06-27 NOTE — OB LABOR/OXYTOCIN SAFETY PROGRESS
Labor Progress Note - Priya Johnston 38 y.o. female MRN: [id number]    Unit/Bed#: -01 Encounter: [id number]       Contraction Frequency (minutes): 2-5.5  Contraction Intensity: Mild  Uterine Activity Characteristics: Irregular  Cervical Dilation: 1        Cervical Effacement: 50  Fetal Station: Ballotable  Baseline Rate (FHR): 155 bpm  Fetal Heart Rate (FHT): 150 BPM                 Vital Signs:   Vitals:    06/27/24 0844   BP: 141/84   Pulse: 95   Resp: 18   Temp: 97.9 °F (36.6 °C)   SpO2:        A 24F duron with a 30cc balloon was selected. SVE was performed and cervix was located. Duron was introduced over sterile gloved hands under direct visualization using a speculum. Balloon advanced through cervix beyond the internal cervical os. A small amount amount of sterile normal saline solution was instilled in the balloon to confirm placement. Placement was confirmed to be beyond the internal cervical os. A total of 60cc of sterile normal saline solution was instilled into the balloon. Patient tolerated well. Instructions left with RN to place duron to gravity with a 1L bag of IV fluid. Notify MD when duron dislodged.    Amanda Tilley MD 6/27/2024 9:40 AM

## 2024-06-27 NOTE — PLAN OF CARE
Problem: BIRTH - VAGINAL/ SECTION  Goal: Fetal and maternal status remain reassuring during the birth process  Description: INTERVENTIONS:  - Monitor vital signs  - Monitor fetal heart rate  - Monitor uterine activity  - Monitor labor progression (vaginal delivery)  - DVT prophylaxis  - Antibiotic prophylaxis  Outcome: Progressing  Goal: Emotionally satisfying birthing experience for mother/fetus  Description: Interventions:  - Assess, plan, implement and evaluate the nursing care given to the patient in labor  - Advocate the philosophy that each childbirth experience is a unique experience and support the family's chosen level of involvement and control during the labor process   - Actively participate in both the patient's and family's teaching of the birth process  - Consider cultural, [employer] and age-specific factors and plan care for the patient in labor  Outcome: Progressing     Problem: Knowledge Deficit  Goal: Verbalizes understanding of labor plan  Description: Assess patient/family/caregiver's baseline knowledge level and ability to understand information.  Provide education via patient/family/caregiver's preferred learning method at appropriate level of understanding.     1. Provide teaching at level of understanding.  2. Provide teaching via preferred learning method(s).  Outcome: Progressing  Goal: Patient/family/caregiver demonstrates understanding of disease process, treatment plan, medications, and discharge instructions  Description: Complete learning assessment and assess knowledge base.  Interventions:  - Provide teaching at level of understanding  - Provide teaching via preferred learning methods  Outcome: Progressing     Problem: Labor & Delivery  Goal: Manages discomfort  Description: Assess and monitor for signs and symptoms of discomfort.  Assess patient's pain level regularly and per hospital policy.  Administer medications as ordered. Support use of nonpharmacological methods to  help control pain such as distraction, imagery, relaxation, and application of heat and cold.  Collaborate with interdisciplinary team and patient to determine appropriate pain management plan.    1. Include patient in decisions related to comfort.  2. Offer non-pharmacological pain management interventions.  3. Report ineffective pain management to physician.  Outcome: Progressing  Goal: Patient vital signs are stable  Description: 1. Assess vital signs - vaginal delivery.  Outcome: Progressing     Problem: PAIN - ADULT  Goal: Verbalizes/displays adequate comfort level or baseline comfort level  Description: Interventions:  - Encourage patient to monitor pain and request assistance  - Assess pain using appropriate pain scale  - Administer analgesics based on type and severity of pain and evaluate response  - Implement non-pharmacological measures as appropriate and evaluate response  - Consider cultural and social influences on pain and pain management  - Notify physician/advanced practitioner if interventions unsuccessful or patient reports new pain  Outcome: Progressing     Problem: INFECTION - ADULT  Goal: Absence or prevention of progression during hospitalization  Description: INTERVENTIONS:  - Assess and monitor for signs and symptoms of infection  - Monitor lab/diagnostic results  - Monitor all insertion sites, i.e. indwelling lines, tubes, and drains  - Monitor endotracheal if appropriate and nasal secretions for changes in amount and color  - [place] appropriate cooling/warming therapies per order  - Administer medications as ordered  - Instruct and encourage patient and family to use good hand hygiene technique  - Identify and instruct in appropriate isolation precautions for identified infection/condition  Outcome: Progressing  Goal: Absence of fever/infection during neutropenic period  Description: INTERVENTIONS:  - Monitor WBC    Outcome: Progressing     Problem: SAFETY ADULT  Goal: Patient will  remain free of falls  Description: INTERVENTIONS:  - Educate patient/family on patient safety including physical limitations  - Instruct patient to call for assistance with activity   - Consult OT/PT to assist with strengthening/mobility   - Keep Call bell within reach  - Keep bed low and locked with side rails adjusted as appropriate  - Keep care items and personal belongings within reach  - Initiate and maintain comfort rounds  - Make Fall Risk Sign visible to staff  - Apply yellow socks and bracelet for high fall risk patients  - Consider moving patient to room near nurses station  Outcome: Progressing  Goal: Maintain or return to baseline ADL function  Description: INTERVENTIONS:  -  Assess patient's ability to carry out ADLs; assess patient's baseline for ADL function and identify physical deficits which impact ability to perform ADLs (bathing, care of mouth/teeth, toileting, grooming, dressing, etc.)  - Assess/evaluate cause of self-care deficits   - Assess range of motion  - Assess patient's mobility; develop plan if impaired  - Assess patient's need for assistive devices and provide as appropriate  - Encourage maximum independence but intervene and supervise when necessary  - Involve family in performance of ADLs  - Assess for home care needs following discharge   - Consider OT consult to assist with ADL evaluation and planning for discharge  - Provide patient education as appropriate  Outcome: Progressing  Goal: Maintains/Returns to pre admission functional level  Description: INTERVENTIONS:  - Perform AM-PAC 6 Click Basic Mobility/ Daily Activity assessment daily.  - Set and communicate daily mobility goal to care team and patient/family/caregiver.   - Collaborate with rehabilitation services on mobility goals if consulted  - Out of bed for toileting  - Record patient progress and toleration of activity level   Outcome: Progressing     Problem: DISCHARGE PLANNING  Goal: Discharge to home or other facility  with appropriate resources  Description: INTERVENTIONS:  - Identify barriers to discharge w/patient and caregiver  - Arrange for needed discharge resources and transportation as appropriate  - Identify discharge learning needs (meds, wound care, etc.)  - Arrange for interpretive services to assist at discharge as needed  - Refer to Case Management Department for coordinating discharge planning if the patient needs post-hospital services based on physician/advanced practitioner order or complex needs related to functional status, cognitive ability, or social support system  Outcome: Progressing

## 2024-06-27 NOTE — OB LABOR/OXYTOCIN SAFETY PROGRESS
Labor Progress Note - Priya Johnston 38 y.o. female MRN: [id number]    Unit/Bed#: -01 Encounter: [id number]       Contraction Frequency (minutes): 3-9  Contraction Intensity: Mild  Uterine Activity Characteristics: Irritability  Cervical Dilation: 1-2        Cervical Effacement: 30  Fetal Station: -3  Baseline Rate (FHR): 140 bpm  Fetal Heart Rate (FHT): 150 BPM  FHR Category: 1               Vital Signs:   Vitals:    06/27/24 1256   BP: 134/74   Pulse: 75   Resp: 18   Temp: 97.5 °F (36.4 °C)   SpO2:        Notes/comments:   SVE by Dr. Hernandez to evaluate progression of Lewis balloon expulsion.  On exam Lewis balloon noted to be in vagina and cervical exam noted to be on changed.  Suspect that Lewis balloon was never fully passed through cervical os.  SVE remains 1-2/30/-3.  Lewis balloon replaced as described below.  Will plan to initiate Pitocin titration.      PROCEDURE:  LEWIS BALLOON PLACEMENT    A 24F lewis with a 30cc balloon was selected, SVE was performed and cervix was located, lewis was introduced over sterile gloved hands. Balloon advanced through cervix beyond the internal cervical os. A small amount amount of sterile saline solution was instilled in the balloon to confirm placement. Placement was confirmed to be beyond the internal cervical os. A total of 60cc of sterile saline solution was placed into the balloon. Pt tolerated well.  Notify MD when lewis dislodged.    MD Marly Gray MD 6/27/2024 4:55 PM

## 2024-06-27 NOTE — PLAN OF CARE
Problem: BIRTH - VAGINAL/ SECTION  Goal: Fetal and maternal status remain reassuring during the birth process  Description: INTERVENTIONS:  - Monitor vital signs  - Monitor fetal heart rate  - Monitor uterine activity  - Monitor labor progression (vaginal delivery)  - DVT prophylaxis  - Antibiotic prophylaxis  Outcome: Progressing  Goal: Emotionally satisfying birthing experience for mother/fetus  Description: Interventions:  - Assess, plan, implement and evaluate the nursing care given to the patient in labor  - Advocate the philosophy that each childbirth experience is a unique experience and support the family's chosen level of involvement and control during the labor process   - Actively participate in both the patient's and family's teaching of the birth process  - Consider cultural, [employer] and age-specific factors and plan care for the patient in labor  Outcome: Progressing     Problem: Knowledge Deficit  Goal: Verbalizes understanding of labor plan  Description: Assess patient/family/caregiver's baseline knowledge level and ability to understand information.  Provide education via patient/family/caregiver's preferred learning method at appropriate level of understanding.     1. Provide teaching at level of understanding.  2. Provide teaching via preferred learning method(s).  Outcome: Progressing  Goal: Patient/family/caregiver demonstrates understanding of disease process, treatment plan, medications, and discharge instructions  Description: Complete learning assessment and assess knowledge base.  Interventions:  - Provide teaching at level of understanding  - Provide teaching via preferred learning methods  Outcome: Progressing     Problem: Labor & Delivery  Goal: Manages discomfort  Description: Assess and monitor for signs and symptoms of discomfort.  Assess patient's pain level regularly and per hospital policy.  Administer medications as ordered. Support use of nonpharmacological methods to  help control pain such as distraction, imagery, relaxation, and application of heat and cold.  Collaborate with interdisciplinary team and patient to determine appropriate pain management plan.    1. Include patient in decisions related to comfort.  2. Offer non-pharmacological pain management interventions.  3. Report ineffective pain management to physician.  Outcome: Progressing  Goal: Patient vital signs are stable  Description: 1. Assess vital signs - vaginal delivery.  Outcome: Progressing     Problem: PAIN - ADULT  Goal: Verbalizes/displays adequate comfort level or baseline comfort level  Description: Interventions:  - Encourage patient to monitor pain and request assistance  - Assess pain using appropriate pain scale  - Administer analgesics based on type and severity of pain and evaluate response  - Implement non-pharmacological measures as appropriate and evaluate response  - Consider cultural and social influences on pain and pain management  - Notify physician/advanced practitioner if interventions unsuccessful or patient reports new pain  Outcome: Progressing     Problem: INFECTION - ADULT  Goal: Absence or prevention of progression during hospitalization  Description: INTERVENTIONS:  - Assess and monitor for signs and symptoms of infection  - Monitor lab/diagnostic results  - Monitor all insertion sites, i.e. indwelling lines, tubes, and drains  - Monitor endotracheal if appropriate and nasal secretions for changes in amount and color  - [place] appropriate cooling/warming therapies per order  - Administer medications as ordered  - Instruct and encourage patient and family to use good hand hygiene technique  - Identify and instruct in appropriate isolation precautions for identified infection/condition  Outcome: Progressing  Goal: Absence of fever/infection during neutropenic period  Description: INTERVENTIONS:  - Monitor WBC    Outcome: Progressing     Problem: SAFETY ADULT  Goal: Patient will  remain free of falls  Description: INTERVENTIONS:  - Educate patient/family on patient safety including physical limitations  - Instruct patient to call for assistance with activity   - Consult OT/PT to assist with strengthening/mobility   - Keep Call bell within reach  - Keep bed low and locked with side rails adjusted as appropriate  - Keep care items and personal belongings within reach  - Initiate and maintain comfort rounds  - Make Fall Risk Sign visible to staff  - Apply yellow socks and bracelet for high fall risk patients  - Consider moving patient to room near nurses station  Outcome: Progressing  Goal: Maintain or return to baseline ADL function  Description: INTERVENTIONS:  -  Assess patient's ability to carry out ADLs; assess patient's baseline for ADL function and identify physical deficits which impact ability to perform ADLs (bathing, care of mouth/teeth, toileting, grooming, dressing, etc.)  - Assess/evaluate cause of self-care deficits   - Assess range of motion  - Assess patient's mobility; develop plan if impaired  - Assess patient's need for assistive devices and provide as appropriate  - Encourage maximum independence but intervene and supervise when necessary  - Involve family in performance of ADLs  - Assess for home care needs following discharge   - Consider OT consult to assist with ADL evaluation and planning for discharge  - Provide patient education as appropriate  Outcome: Progressing  Goal: Maintains/Returns to pre admission functional level  Description: INTERVENTIONS:  - Perform AM-PAC 6 Click Basic Mobility/ Daily Activity assessment daily.  - Set and communicate daily mobility goal to care team and patient/family/caregiver.   - Collaborate with rehabilitation services on mobility goals if consulted  - Out of bed for toileting  - Record patient progress and toleration of activity level   Outcome: Progressing     Problem: DISCHARGE PLANNING  Goal: Discharge to home or other facility  with appropriate resources  Description: INTERVENTIONS:  - Identify barriers to discharge w/patient and caregiver  - Arrange for needed discharge resources and transportation as appropriate  - Identify discharge learning needs (meds, wound care, etc.)  - Arrange for interpretive services to assist at discharge as needed  - Refer to Case Management Department for coordinating discharge planning if the patient needs post-hospital services based on physician/advanced practitioner order or complex needs related to functional status, cognitive ability, or social support system  Outcome: Progressing

## 2024-06-28 ENCOUNTER — ANESTHESIA EVENT (INPATIENT)
Dept: ANESTHESIOLOGY | Facility: HOSPITAL | Age: 39
End: 2024-06-28
Payer: COMMERCIAL

## 2024-06-28 ENCOUNTER — ANESTHESIA (INPATIENT)
Dept: ANESTHESIOLOGY | Facility: HOSPITAL | Age: 39
End: 2024-06-28
Payer: COMMERCIAL

## 2024-06-28 LAB
BASE EXCESS BLDCOA CALC-SCNC: -5.2 MMOL/L (ref 3–11)
BASE EXCESS BLDCOV CALC-SCNC: -6.2 MMOL/L (ref 1–9)
HCO3 BLDCOA-SCNC: 22.4 MMOL/L (ref 17.3–27.3)
HCO3 BLDCOV-SCNC: 19.3 MMOL/L (ref 12.2–28.6)
O2 CT VFR BLDCOA CALC: 8.5 ML/DL
OXYHGB MFR BLDCOA: 41.4 %
OXYHGB MFR BLDCOV: 60.9 %
PCO2 BLDCOA: 52 MM[HG] (ref 30–60)
PCO2 BLDCOV: 38.4 MM HG (ref 27–43)
PH BLDCOA: 7.25 [PH] (ref 7.23–7.43)
PH BLDCOV: 7.32 [PH] (ref 7.19–7.49)
PO2 BLDCOA: 22.1 MM HG (ref 5–25)
PO2 BLDCOV: 27 MM HG (ref 15–45)
SAO2 % BLDCOV: 12.6 ML/DL

## 2024-06-28 PROCEDURE — 82805 BLOOD GASES W/O2 SATURATION: CPT | Performed by: OBSTETRICS & GYNECOLOGY

## 2024-06-28 PROCEDURE — 0HQ9XZZ REPAIR PERINEUM SKIN, EXTERNAL APPROACH: ICD-10-PCS | Performed by: OBSTETRICS & GYNECOLOGY

## 2024-06-28 PROCEDURE — NC001 PR NO CHARGE: Performed by: OBSTETRICS & GYNECOLOGY

## 2024-06-28 PROCEDURE — 59400 OBSTETRICAL CARE: CPT | Performed by: OBSTETRICS & GYNECOLOGY

## 2024-06-28 RX ORDER — SENNOSIDES 8.6 MG
1 TABLET ORAL DAILY
Status: DISCONTINUED | OUTPATIENT
Start: 2024-06-28 | End: 2024-06-30 | Stop reason: HOSPADM

## 2024-06-28 RX ORDER — ONDANSETRON 2 MG/ML
4 INJECTION INTRAMUSCULAR; INTRAVENOUS EVERY 8 HOURS PRN
Status: DISCONTINUED | OUTPATIENT
Start: 2024-06-28 | End: 2024-06-30 | Stop reason: HOSPADM

## 2024-06-28 RX ORDER — LIDOCAINE HYDROCHLORIDE AND EPINEPHRINE 15; 5 MG/ML; UG/ML
INJECTION, SOLUTION EPIDURAL AS NEEDED
Status: DISCONTINUED | OUTPATIENT
Start: 2024-06-28 | End: 2024-07-03 | Stop reason: HOSPADM

## 2024-06-28 RX ORDER — IBUPROFEN 600 MG/1
600 TABLET ORAL EVERY 6 HOURS SCHEDULED
Status: DISCONTINUED | OUTPATIENT
Start: 2024-06-28 | End: 2024-06-30 | Stop reason: HOSPADM

## 2024-06-28 RX ORDER — MAGNESIUM HYDROXIDE/ALUMINUM HYDROXICE/SIMETHICONE 120; 1200; 1200 MG/30ML; MG/30ML; MG/30ML
15 SUSPENSION ORAL EVERY 6 HOURS PRN
Status: DISCONTINUED | OUTPATIENT
Start: 2024-06-28 | End: 2024-06-30 | Stop reason: HOSPADM

## 2024-06-28 RX ORDER — OXYTOCIN/RINGER'S LACTATE 30/500 ML
250 PLASTIC BAG, INJECTION (ML) INTRAVENOUS CONTINUOUS
Status: ACTIVE | OUTPATIENT
Start: 2024-06-28 | End: 2024-06-28

## 2024-06-28 RX ORDER — BUPIVACAINE HYDROCHLORIDE 2.5 MG/ML
INJECTION, SOLUTION EPIDURAL; INFILTRATION; INTRACAUDAL AS NEEDED
Status: DISCONTINUED | OUTPATIENT
Start: 2024-06-28 | End: 2024-07-03 | Stop reason: HOSPADM

## 2024-06-28 RX ORDER — BENZOCAINE/MENTHOL 6 MG-10 MG
1 LOZENGE MUCOUS MEMBRANE DAILY PRN
Status: DISCONTINUED | OUTPATIENT
Start: 2024-06-28 | End: 2024-06-30 | Stop reason: HOSPADM

## 2024-06-28 RX ORDER — FENTANYL CITRATE 50 UG/ML
INJECTION, SOLUTION INTRAMUSCULAR; INTRAVENOUS AS NEEDED
Status: DISCONTINUED | OUTPATIENT
Start: 2024-06-28 | End: 2024-07-03 | Stop reason: HOSPADM

## 2024-06-28 RX ORDER — PREDNISONE 20 MG/1
20 TABLET ORAL DAILY
Status: COMPLETED | OUTPATIENT
Start: 2024-06-29 | End: 2024-06-29

## 2024-06-28 RX ORDER — CALCIUM CARBONATE 500 MG/1
1000 TABLET, CHEWABLE ORAL 3 TIMES DAILY PRN
Status: DISCONTINUED | OUTPATIENT
Start: 2024-06-28 | End: 2024-06-30 | Stop reason: HOSPADM

## 2024-06-28 RX ORDER — DOCUSATE SODIUM 100 MG/1
100 CAPSULE, LIQUID FILLED ORAL 2 TIMES DAILY
Status: DISCONTINUED | OUTPATIENT
Start: 2024-06-28 | End: 2024-06-30 | Stop reason: HOSPADM

## 2024-06-28 RX ORDER — DIPHENHYDRAMINE HCL 25 MG
25 TABLET ORAL EVERY 6 HOURS PRN
Status: DISCONTINUED | OUTPATIENT
Start: 2024-06-28 | End: 2024-06-30 | Stop reason: HOSPADM

## 2024-06-28 RX ORDER — SIMETHICONE 80 MG
80 TABLET,CHEWABLE ORAL 4 TIMES DAILY PRN
Status: DISCONTINUED | OUTPATIENT
Start: 2024-06-28 | End: 2024-06-30 | Stop reason: HOSPADM

## 2024-06-28 RX ORDER — ACETAMINOPHEN 325 MG/1
650 TABLET ORAL EVERY 6 HOURS SCHEDULED
Status: DISCONTINUED | OUTPATIENT
Start: 2024-06-28 | End: 2024-06-30 | Stop reason: HOSPADM

## 2024-06-28 RX ADMIN — CALCIUM CARBONATE (ANTACID) CHEW TAB 500 MG 1000 MG: 500 CHEW TAB at 11:48

## 2024-06-28 RX ADMIN — ACETAMINOPHEN 975 MG: 325 TABLET, FILM COATED ORAL at 11:47

## 2024-06-28 RX ADMIN — MAGNESIUM SULFATE HEPTAHYDRATE 2 G/HR: 40 INJECTION, SOLUTION INTRAVENOUS at 00:35

## 2024-06-28 RX ADMIN — METOCLOPRAMIDE 5 MG: 5 INJECTION, SOLUTION INTRAMUSCULAR; INTRAVENOUS at 11:48

## 2024-06-28 RX ADMIN — WITCH HAZEL 1 PAD: 500 SOLUTION RECTAL; TOPICAL at 19:14

## 2024-06-28 RX ADMIN — SERTRALINE HYDROCHLORIDE 50 MG: 50 TABLET ORAL at 10:25

## 2024-06-28 RX ADMIN — METOCLOPRAMIDE 5 MG: 5 INJECTION, SOLUTION INTRAMUSCULAR; INTRAVENOUS at 05:32

## 2024-06-28 RX ADMIN — BUPIVACAINE HYDROCHLORIDE 6 ML: 2.5 INJECTION, SOLUTION EPIDURAL; INFILTRATION; INTRACAUDAL; PERINEURAL at 10:44

## 2024-06-28 RX ADMIN — MAGNESIUM SULFATE HEPTAHYDRATE 2 G/HR: 40 INJECTION, SOLUTION INTRAVENOUS at 20:07

## 2024-06-28 RX ADMIN — HYDROCORTISONE 1 APPLICATION: 1 CREAM TOPICAL at 19:14

## 2024-06-28 RX ADMIN — SODIUM CHLORIDE, SODIUM LACTATE, POTASSIUM CHLORIDE, AND CALCIUM CHLORIDE 50 ML/HR: .6; .31; .03; .02 INJECTION, SOLUTION INTRAVENOUS at 05:56

## 2024-06-28 RX ADMIN — IBUPROFEN 600 MG: 600 TABLET, FILM COATED ORAL at 19:14

## 2024-06-28 RX ADMIN — LIDOCAINE HYDROCHLORIDE AND EPINEPHRINE 2 ML: 15; 5 INJECTION, SOLUTION EPIDURAL at 06:29

## 2024-06-28 RX ADMIN — ROPIVACAINE HYDROCHLORIDE: 2 INJECTION, SOLUTION EPIDURAL; INFILTRATION at 06:39

## 2024-06-28 RX ADMIN — ONDANSETRON 4 MG: 2 INJECTION INTRAMUSCULAR; INTRAVENOUS at 07:42

## 2024-06-28 RX ADMIN — PREDNISONE 20 MG: 20 TABLET ORAL at 10:25

## 2024-06-28 RX ADMIN — ROPIVACAINE HYDROCHLORIDE: 2 INJECTION, SOLUTION EPIDURAL; INFILTRATION at 12:13

## 2024-06-28 RX ADMIN — FENTANYL CITRATE 100 MCG: 50 INJECTION INTRAMUSCULAR; INTRAVENOUS at 10:44

## 2024-06-28 RX ADMIN — MAGNESIUM SULFATE HEPTAHYDRATE 2 G: 40 INJECTION, SOLUTION INTRAVENOUS at 00:04

## 2024-06-28 RX ADMIN — MAGNESIUM SULFATE HEPTAHYDRATE 2 G/HR: 40 INJECTION, SOLUTION INTRAVENOUS at 10:47

## 2024-06-28 RX ADMIN — ACETAMINOPHEN 975 MG: 325 TABLET, FILM COATED ORAL at 04:03

## 2024-06-28 RX ADMIN — BENZOCAINE AND LEVOMENTHOL 1 APPLICATION: 200; 5 SPRAY TOPICAL at 19:14

## 2024-06-28 RX ADMIN — LIDOCAINE HYDROCHLORIDE AND EPINEPHRINE 3 ML: 15; 5 INJECTION, SOLUTION EPIDURAL at 06:24

## 2024-06-28 NOTE — ANESTHESIA PROCEDURE NOTES
Epidural Block    Patient location during procedure: OR  Start time: 6/28/2024 6:22 AM  Reason for block: procedure for pain  Staffing  Performed by: Elaine Spears CRNA  Authorized by: Kieran Dalton MD    Preanesthetic Checklist  Completed: patient identified, IV checked, site marked, risks and benefits discussed, surgical consent, monitors and equipment checked, pre-op evaluation and timeout performed  Epidural  Patient position: sitting  Prep: ChloraPrep  Sedation Level: no sedation  Patient monitoring: frequent blood pressure checks, continuous pulse oximetry and heart rate  Approach: midline  Location: lumbar, L3-4  Injection technique: LITO saline  Needle  Needle type: Tuohy   Needle gauge: 17 G  Needle insertion depth: 7.5 cm  Catheter type: multi-orifice  Catheter size: 20 G  Catheter at skin depth: 12 cm  Catheter securement method: stabilization device and clear occlusive dressing  Test dose: negative  Assessment  Sensory level: T10  Number of attempts: 2negative aspiration for CSF, negative aspiration for heme and no paresthesia on injection  patient tolerated the procedure well with no immediate complications  Additional Notes  3 needle sticks, 2 at same level due to readjustment of patient's spine in bed. second level below proved successful.     Patient did suffer from feeling faint during first attempt. Procedure paused until patient felt able to try again

## 2024-06-28 NOTE — OB LABOR/OXYTOCIN SAFETY PROGRESS
Oxytocin Safety Progress Check Note - Priya Johnston 38 y.o. female MRN: [id number]    Unit/Bed#: -01 Encounter: [id number]    Dose (lc-units/min) Oxytocin: 8 lc-units/min  Contraction Frequency (minutes): 1.5-4  Contraction Intensity: Mild  Uterine Activity Characteristics: Irregular  Cervical Dilation: 4-5        Cervical Effacement: 50  Fetal Station: -2  Baseline Rate (FHR): 140 bpm  Fetal Heart Rate (FHT): 146 BPM  FHR Category: 2               Vital Signs:   Vitals:    06/28/24 0035   BP: 116/57   Pulse: 99   Resp: 18   Temp: 97.6 °F (36.4 °C)   SpO2: 97%       Notes/comments:   FHT with spontaneous 3m prolonged deceleration. Resolved with maternal repositioning and pitocin held. Return to baseline with moderate variability. SVE unchanged. Resume pitocin titration as tolerated. Dr. Joaquin saxena.     Darwin Lakhani MD 6/28/2024 1:08 AM

## 2024-06-28 NOTE — OB LABOR/OXYTOCIN SAFETY PROGRESS
Oxytocin Safety Progress Check Note - Priya Johnston 38 y.o. female MRN: [id number]    Unit/Bed#: -01 Encounter: [id number]    Dose (lc-units/min) Oxytocin: 6 lc-units/min  Contraction Frequency (minutes): 1.5-4  Contraction Intensity: Mild  Uterine Activity Characteristics: Irregular  Cervical Dilation: 4-5        Cervical Effacement: 50  Fetal Station: -2  Baseline Rate (FHR): 145 bpm  Fetal Heart Rate (FHT): 138 BPM  FHR Category: I               Vital Signs:   Vitals:    06/27/24 2245   BP: 135/74   Pulse: 86   Resp:    Temp:    SpO2:        Notes/comments:   Patient's headache is returning.  She has no other symptoms.  She has been normotensive.  SVE as above.  FHT category 1.  Continue Pitocin titration. Patient with persistent headache throughout the day. Will start magnesium for seizure prophylaxis     Jessica Cárdenas MD 6/27/2024 11:07 PM

## 2024-06-28 NOTE — OB LABOR/OXYTOCIN SAFETY PROGRESS
Oxytocin Safety Progress Check Note - Priya Johnston 38 y.o. female MRN: [id number]    Unit/Bed#: -01 Encounter: [id number]    Dose (lc-units/min) Oxytocin: 4 lc-units/min  Contraction Frequency (minutes): 2.5-3.5  Contraction Intensity: Mild  Uterine Activity Characteristics: Irregular  Cervical Dilation: 1-2        Cervical Effacement: 30  Fetal Station: -3  Baseline Rate (FHR): 145 bpm  Fetal Heart Rate (FHT): 155 BPM  FHR Category: I               Vital Signs:   Vitals:    24   BP: 142/69   Pulse: 67   Resp:    Temp:    SpO2:        Notes/comments:   Patient is feeling well at this time.  She declines headache, vision changes, chest pain, shortness breath, right upper quadrant pain.  Franks balloon is still in place and Pitocin is running.  FHT category 1. SVE deferred at this time.     I consented the patient for delivery. The patient was counseled about the labor process. We discussed three routes of delivery including spontaneous vaginal delivery, operative vaginal delivery and  delivery. The patient was counseled on the risks of vaginal delivery including pain, vaginal/perineal tears and the need for repair at the bedside, infection and bleeding.      Patient counseled on indications necessitating operative vaginal delivery including: maternal medical indications in which patient would need to avoid pushing, prolonged second stage and nonreassuring fetal heart tracing during second stage. Patient counseled on maternal risks of vaginal delivery including increase risk of tearing and hemorrhage. We also discussed fetal risks including intracranial hemorrhage, lacerations, nerve damage or fracture. Patient is aware that NICU providers would be available at the time of delivery to assess fetal status after delivery and need for resuscitation.      She was counseled on the indications for  section including: failed induction, arrest of dilation, persistent category II  tracing and arrest of descent. She was counseled on the indications for emergent  section including evidence of worsening fetal or maternal status, and that there is a risk of general anesthesia. We discussed the risks of  including bleeding, infection, and injury to surrounding structures including the bowel and bladder.     She was counseled that there are medical and surgical methods to manage excessive postpartum bleeding. She was counseled that in the event of excessive blood loss, she may require blood transfusion which includes a small risk of blood borne diseases such as hepatitis and HIV. The patient is OK with receiving a blood transfusion if necessary.  The patient had an opportunity to ask questions and signed consent.       Jessica Cárdenas MD 2024 8:44 PM

## 2024-06-28 NOTE — OB LABOR/OXYTOCIN SAFETY PROGRESS
Oxytocin Safety Progress Check Note - Priya Johnston 38 y.o. female MRN: [id number]    Unit/Bed#: -01 Encounter: [id number]    Dose (lc-units/min) Oxytocin: 16 lc-units/min  Contraction Frequency (minutes): 2-5  Contraction Intensity: Moderate  Uterine Activity Characteristics: Irregular  Cervical Dilation: 10  Dilation Complete Date: 24  Dilation Complete Time: 1337  Cervical Effacement: 100  Fetal Station: 1  Baseline Rate (FHR): 140 bpm  Fetal Heart Rate (FHT): 150 BPM  FHR Category: 1               Vital Signs:   Vitals:    24 1315   BP: 116/63   Pulse: 76   Resp:    Temp:    SpO2:        Notes/comments:   Patient complete. Dr. Gray aware. Anticipate CONSUELO Hernandez MD 2024 1:39 PM

## 2024-06-28 NOTE — DISCHARGE SUMMARY
Discharge Summary - OB/GYN   Priya Johnston 38 y.o. female MRN: [id number]  Unit/Bed#: -01 Encounter: [id number]      Admission Date: 2024     Discharge Date: 24     Admitting Diagnosis:   Patient Active Problem List   Diagnosis    Migraine with aura and without status migrainosus, not intractable    Nausea/vomiting in pregnancy    Incarcerated gravid uterus in first trimester    Anxiety during pregnancy, antepartum, second trimester    AMA (advanced maternal age) primigravida 35+, third trimester    37 weeks gestation of pregnancy    Gestational hypertension    Encounter for induction of labor        Discharge Diagnosis:   Same, delivered    Procedures: spontaneous vaginal delivery    Delivery Attending: Dr. Josue  Discharge Attending: Dr. Josue    [facility] Course:   Priya Johnston is a 38 y.o. now  at 37w6d wks who was initially admitted for induction of labor in setting of gestational hypertension. Admission CBC and CMP were wnl; urine PC was 0.16. Pregnancy was complicated by gHTN, anxiety, and migraines. On presentation, SVE was 1/50/-4 and FB was placed. Pitocin titration was started. She met criteria for preeclampsia with severe features due to persistent headache and magnesium gtt was started. Amniotomy was performed for clear fluid. She received an epidural. She progressed to complete and began pushing.      She delivered a viable male  on 24 at 1444. Weight 6lbs 7oz via spontaneous vaginal delivery. Apgars were 9 (1 min) and 9 (5 min).  was transferred to  nursery. Patient tolerated the procedure well and was transferred to recovery in stable condition.     Her postpartum course was uncomplicated. Her postpartum pain was well controlled with oral analgesics.    On day of discharge, she was ambulating and able to reasonably perform all ADLs. She was voiding and had appropriate bowel function. Pain was well controlled. She was discharged home on  post-partum day #2 without complications. Patient was instructed to follow up with her OB as an outpatient and was given appropriate warnings to call provider if she develops signs of infection or uncontrolled pain.    Complications: none apparent    Condition at discharge: good     Discharge instructions/Information to patient and family:   - Do not place anything (no partner, tampons or douche) in your vagina for 6 weeks.  -You may walk for exercise for the first 6 weeks then gradually return to your usual activities.   -Please do not drive for 1 week if you have no stitches and for 2 weeks if you have stitches or underwent a  delivery.    -You may take baths or shower per your preference.   -Please look at your bust (breasts) in the mirror daily and call for redness or tenderness or increased warmth.   -Please call us for temperature > 100.4*F or 38* C, worsening pain or a foul discharge.      Discharge Medications:   Prenatal vitamin daily for 6 months or the duration of nursing whichever is longer.  Motrin 600 mg orally every 6 hours as needed for pain  Tylenol (over the counter) per bottle directions as needed for pain: do NOT use with percocet  Hydrocortisone cream 1% (over the counter) applied 1-2x daily to hemorrhoids as needed    Planned Readmission: No    Amanda Tilley MD  PGY-1 OBGYN

## 2024-06-28 NOTE — UTILIZATION REVIEW
SEE INITIAL REVIEW AT BOTTOM    Continued Stay Review  Date: ,                         Current Patient Class: inpatient    Current Level of Care: L&D    HPI:38 y.o. female initially admitted on  w meeting criteria for gHTN requiring IOL started 24  w Franks, IV PIT titration, epidural     Viable male on 2024 at 1444, with APGARS of 9 and 9      Assessment/Plan:   DATE   DAY 2  Oxytocin: 4 lc-units/min  Contraction Frequency (minutes): 2.5-3.5  Contraction Intensity: Mild  Uterine Activity Characteristics: Irregular  Cervical Dilation: 1-2  Cervical Effacement: 30  Fetal Station: -3  Baseline Rate (FHR): 145 bpm  Fetal Heart Rate (FHT): 155 BPM  FHR Category: I    DATE  2024  DAY 3  @ 1:08 AM  Oxytocin: 8 lc-units/min  Contraction Frequency (minutes): 1.5-4  Contraction Intensity: Mild  Uterine Activity Characteristics: Irregular  Cervical Dilation: 4-5  Cervical Effacement: 50  Fetal Station: -2  Baseline Rate (FHR): 140 bpm  Fetal Heart Rate (FHT): 146 BPM  FHR Category: 2     Epidural 2024 6:22 AM   @ 10:45 AM   Anesthesia with patient to review pain and possible bolus.   Recheck in 2-3 hours or sooner if clinically indicated.   If variables become recurrent again, consider IUPC.    Oxytocin: 16 lc-units/min  Contraction Frequency (minutes): difficult trace  Contraction Intensity: Moderate  Uterine Activity Characteristics: Regular  Cervical Dilation: 5-6  Cervical Effacement: 90  Fetal Station: -1  Baseline Rate (FHR): 140 bpm  Fetal Heart Rate (FHT): 132 BPM  FHR Category: 1     @ 1:39 PM  Patient complete. . Anticipate      Dose (lc-units/min) Oxytocin: 16 lc-units/min  Contraction Frequency (minutes): 2-5  Contraction Intensity: Moderate  Uterine Activity Characteristics: Irregular  Cervical Dilation: 10  Dilation Complete Date: 24  Dilation Complete Time: 1337  Cervical Effacement: 100  Fetal Station: 1  Baseline Rate (FHR): 140 bpm  Fetal Heart  Rate (FHT): 150 BPM  FHR Category: 1    6/28  Viable male on 6/28/2024 at 1444, with APGARS of 9 and 9    BW unavailable    Vital Signs (last 3 days)       Date/Time Temp Pulse Resp BP SpO2 O2 Device Cardiac (WDL) Patient Position - Orthostatic VS Pain    06/28/24 1500 98.3 °F (36.8 °C) -- 16 136/76 -- -- -- -- --    06/28/24 1457 -- 81 -- 136/76 -- -- -- -- --    06/28/24 1428 -- 93 -- 145/92 -- -- -- -- --    06/28/24 1413 -- 85 -- 136/86 -- -- -- -- --    06/28/24 1358 -- 107 -- 155/98 -- -- -- -- --    06/28/24 1343 -- 111 -- 154/98 -- -- -- -- --    06/28/24 1329 -- 75 -- 108/60 -- -- -- -- --    06/28/24 1315 -- 76 -- 116/63 -- -- -- -- --    06/28/24 1300 98.3 °F (36.8 °C) -- 16 -- -- -- -- -- --    06/28/24 1258 -- 76 -- 110/64 -- -- -- -- --    06/28/24 1242 -- 83 -- 127/69 -- -- -- -- --    06/28/24 1227 -- 75 -- 139/76 -- -- -- -- --    06/28/24 1216 -- 86 -- -- -- -- -- -- --    06/28/24 1215 -- 86 -- 129/74 -- -- -- -- --    06/28/24 1213 -- 80 -- 129/74 -- -- -- -- --    06/28/24 1200 98.3 °F (36.8 °C) 89 16 140/80 96 % None (Room air) -- -- --    06/28/24 1159 -- 89 -- 140/80 -- -- -- -- --    06/28/24 1147 -- -- -- -- -- -- -- -- 8    06/28/24 1143 -- 93 -- 131/86 -- -- -- -- --    06/28/24 1127 -- 78 -- 126/65 -- -- -- -- --    06/28/24 1115 -- -- -- 135/66 -- -- -- -- --    06/28/24 1112 -- 88 -- -- -- -- -- -- --    06/28/24 1100 -- -- -- 137/65 -- -- -- -- --    06/28/24 1057 -- 77 -- -- -- -- -- -- --    06/28/24 1047 -- -- -- 138/63 -- -- -- -- --    06/28/24 1045 -- 82 -- 138/63 -- -- -- -- --    06/28/24 1030 -- 91 -- 131/87 -- -- -- -- --    06/28/24 1015 -- 89 -- 124/67 -- -- -- -- --    06/28/24 1000 97.7 °F (36.5 °C) 86 16 128/74 -- None (Room air) -- -- --    06/28/24 0943 -- 84 -- 125/74 -- -- -- -- --    06/28/24 0929 -- 78 -- 128/71 -- -- -- -- --    06/28/24 0912 -- 77 -- 139/79 -- -- -- -- --    06/28/24 0900 98.3 °F (36.8 °C) -- 18 -- -- -- -- -- --    06/28/24 0842 -- 81 -- 109/73  -- -- -- -- --    06/28/24 0833 -- 0 -- -- -- -- -- -- --    06/28/24 0829 -- 102 -- -- -- -- -- -- --    06/28/24 0828 -- 86 -- 131/72 -- -- -- -- --    06/28/24 0825 -- 88 -- -- -- -- -- -- --    06/28/24 0821 -- 83 -- -- -- -- -- -- --    06/28/24 0817 -- 72 -- -- -- -- -- -- --    06/28/24 0813 -- 74 -- -- -- -- -- -- --    06/28/24 0811 -- 75 -- 118/56 -- -- -- -- --    06/28/24 0809 -- 77 -- -- -- -- -- -- --    06/28/24 0808 -- 89 -- 126/62 -- -- -- -- --    06/28/24 0805 -- 72 -- 126/58 -- -- -- -- --    06/28/24 0802 -- 89 -- 129/63 -- -- -- -- --    06/28/24 0800 98.7 °F (37.1 °C) 68 16 132/64 -- None (Room air) [facility] -- 7    06/28/24 0759 -- 77 -- -- -- -- -- -- --    06/28/24 0757 -- 81 -- -- -- -- -- -- --    06/28/24 0756 -- 86 -- 115/62 -- -- -- -- --    06/28/24 0753 -- 90 -- 146/75 -- -- -- -- --    06/28/24 0751 -- 85 -- 143/65 -- -- -- -- --    06/28/24 0749 -- 82 -- -- -- -- -- -- --    06/28/24 0747 -- 93 -- 119/69 -- -- -- -- --    06/28/24 0745 -- 98 -- -- -- -- -- -- --    06/28/24 0744 -- 110 -- 126/79 -- -- -- -- --    06/28/24 0741 -- 110 -- 129/81 -- -- -- -- --    06/28/24 0737 -- 81 -- -- -- -- -- -- --    06/28/24 0735 -- 84 -- 140/76 -- -- -- -- --    06/28/24 0733 -- 81 -- -- -- -- -- -- --    06/28/24 0732 -- 77 -- 144/75 -- -- -- -- --    06/28/24 0729 -- 71 -- 131/76 -- -- -- -- --    06/28/24 0726 -- 65 -- 136/79 -- -- -- -- --    06/28/24 0725 -- 87 -- -- -- -- -- -- --    06/28/24 0723 -- 71 -- 138/78 -- -- -- -- --    06/28/24 0721 -- 73 -- -- -- -- -- -- --    06/28/24 0720 -- 69 -- 140/79 -- -- -- -- --    06/28/24 0717 -- 69 -- 136/77 -- -- -- -- --    06/28/24 0714 -- 67 -- 136/76 -- -- -- -- --    06/28/24 0713 -- 87 -- -- -- -- -- -- --    06/28/24 0711 -- 76 -- 132/73 -- -- -- -- --    06/28/24 0709 -- 77 -- -- -- -- -- -- --    06/28/24 0708 -- 78 -- 143/77 -- -- -- -- --    06/28/24 0705 -- 85 -- 154/99 -- -- -- -- --    06/28/24 0702 -- 77 -- 150/92 -- -- -- -- --     06/28/24 0701 -- 76 -- -- -- -- -- -- --    06/28/24 0700 -- 80 -- 141/81 -- -- -- -- --    06/28/24 0657 -- 79 -- -- -- -- -- -- --    06/28/24 0656 -- 80 -- 131/61 -- -- -- -- --    06/28/24 0653 -- 86 -- 135/74 -- -- -- -- --    06/28/24 0651 -- 85 -- 133/82 -- -- -- -- --    06/28/24 0649 -- 83 -- -- -- -- -- -- --    06/28/24 0647 -- 89 -- 141/77 -- -- -- -- --    06/28/24 0645 -- 75 -- -- -- -- -- -- --    06/28/24 0644 -- 74 -- 136/74 -- -- -- -- --    06/28/24 0641 -- 76 -- 138/72 -- -- -- -- --    06/28/24 0639 -- -- -- -- -- -- -- -- 9    06/28/24 0638 -- 78 -- 140/76 -- -- -- -- --    06/28/24 0637 -- 87 -- -- -- -- -- -- --    06/28/24 0633 -- 77 -- -- 100 % -- -- -- --    06/28/24 0632 -- 89 -- 111/74 -- -- -- -- --    06/28/24 0629 -- 88 -- 126/66 -- -- -- -- --    06/28/24 0628 -- 81 -- -- 99 % -- -- -- --    06/28/24 0626 -- 73 -- 141/78 -- -- -- -- --    06/28/24 0625 -- 84 -- -- -- -- -- -- --    06/28/24 0623 -- 66 -- -- -- -- -- -- --    06/28/24 0621 -- 82 -- -- -- -- -- -- --    06/28/24 0620 -- 73 -- 139/81 -- -- -- -- --    06/28/24 0618 -- 99 -- -- 100 % -- -- -- --    06/28/24 0617 -- 76 -- 121/79 -- -- -- -- --    06/28/24 0613 -- 80 -- -- -- -- -- -- --    06/28/24 0612 -- 80 -- -- 100 % -- -- -- --    06/28/24 0609 -- 104 -- -- -- -- -- -- --    06/28/24 0607 -- 89 -- -- 100 % -- -- -- --    06/28/24 0605 -- 109 -- -- -- -- -- -- --    06/28/24 0602 -- 89 -- -- 100 % -- -- -- --    06/28/24 0601 -- 114 -- -- -- -- -- -- --    06/28/24 0557 -- 108 -- -- -- -- -- -- --    06/28/24 0553 97.5 °F (36.4 °C) 107 -- -- -- -- -- -- 9    06/28/24 0549 -- 84 -- -- -- -- -- -- --    06/28/24 0545 -- 71 -- 129/76 -- -- -- -- --    06/28/24 0541 -- 65 -- -- -- -- -- -- --    06/28/24 0537 -- 71 -- -- -- -- -- -- --    06/28/24 0533 -- 83 -- -- -- -- -- -- --    06/28/24 0532 -- -- -- -- -- -- -- -- 6    06/28/24 0521 -- 81 -- -- -- -- -- -- --    06/28/24 0517 -- 114 -- -- -- -- -- -- --    06/28/24  0513 -- 117 -- -- -- -- -- -- --    06/28/24 0509 -- 76 -- -- -- -- -- -- --    06/28/24 0505 -- 75 -- -- -- -- -- -- --    06/28/24 0501 -- 70 -- -- -- -- -- -- --    06/28/24 0500 97.8 °F (36.6 °C) -- 18 -- -- -- -- -- --    06/28/24 0457 -- 76 -- -- -- -- -- -- --    06/28/24 0453 -- 74 -- -- -- -- -- -- --    06/28/24 0449 -- 70 -- -- -- -- -- -- --    06/28/24 0445 -- 74 -- 130/84 -- -- -- -- --    06/28/24 0441 -- 65 -- -- -- -- -- -- --    06/28/24 0437 -- 70 -- -- -- -- -- -- --    06/28/24 0433 -- 67 -- -- -- -- -- -- --    06/28/24 0431 -- 69 -- 141/82 -- -- -- -- --    06/28/24 0429 -- 78 -- -- -- -- -- -- --    06/28/24 0425 -- 68 -- -- -- -- -- -- --    06/28/24 0421 -- 92 -- -- -- -- -- -- --    06/28/24 0417 -- 60 -- -- -- -- -- -- --    06/28/24 0416 -- 65 -- 143/81 -- -- -- -- --    06/28/24 0413 -- 63 -- -- -- -- -- -- --    06/28/24 0409 -- 71 -- -- -- -- -- -- --    06/28/24 0405 -- 71 -- -- -- -- -- -- --    06/28/24 0403 -- -- -- -- -- -- -- -- 7    06/28/24 0401 -- 69 -- -- -- -- -- -- --    06/28/24 0400 97.7 °F (36.5 °C) 69 18 137/86 98 % None (Room air) -- Lying --    06/28/24 0345 -- 87 -- 136/82 -- -- -- -- --    06/28/24 0330 -- 66 -- -- -- -- -- -- --    06/28/24 0317 -- 71 -- -- -- -- -- -- --    06/28/24 0315 -- 67 -- 113/55 -- -- -- -- --    06/28/24 0313 -- 73 -- -- -- -- -- -- --    06/28/24 0309 -- 69 -- -- -- -- -- -- --    06/28/24 0305 -- 82 -- -- -- -- -- -- --    06/28/24 0301 -- 82 -- -- -- -- -- -- --    06/28/24 0300 -- 72 -- 112/57 -- -- -- -- --    06/28/24 0257 -- 95 -- -- -- -- -- -- --    06/28/24 0253 -- 108 -- -- -- -- -- -- --    06/28/24 0249 -- 105 -- -- -- -- -- -- --    06/28/24 0247 -- 84 -- 131/76 -- -- -- -- --    06/28/24 0245 -- 85 -- -- -- -- -- -- --    06/28/24 0241 -- 91 -- -- -- -- -- -- --    06/28/24 0237 -- 86 -- -- -- -- -- -- --    06/28/24 0233 -- 79 -- -- -- -- -- -- --    06/28/24 0231 -- 71 -- 128/69 -- -- -- -- --    06/28/24 0229 -- 76 --  -- -- -- -- -- --    06/28/24 0225 -- 87 -- -- -- -- -- -- --    06/28/24 0221 -- 77 -- -- -- -- -- -- --    06/28/24 0219 -- 79 18 -- 97 % None (Room air) -- Lying --    06/28/24 0217 -- 76 -- -- -- -- -- -- --    06/28/24 0216 -- 81 -- 132/73 -- -- -- -- --    06/28/24 0213 -- 82 -- -- -- -- -- -- --    06/28/24 0209 -- 85 -- -- -- -- -- -- --    06/28/24 0205 -- 78 -- -- -- -- -- -- --    06/28/24 0201 -- 101 -- -- -- -- -- -- --    06/28/24 0200 -- 88 -- 119/65 -- -- -- -- --    06/28/24 0157 -- 80 -- -- -- -- -- -- --    06/28/24 0153 -- 82 -- -- -- -- -- -- --    06/28/24 0149 -- 75 -- -- -- -- -- -- --    06/28/24 0145 -- 96 -- 116/69 -- -- -- -- --    06/28/24 0141 -- 77 -- -- -- -- -- -- --    06/28/24 0137 -- 69 -- -- -- -- -- -- --    06/28/24 0133 -- 76 -- -- -- -- -- -- --    06/28/24 0130 -- 69 -- 117/61 -- -- -- -- --    06/28/24 0129 -- 72 -- -- -- -- -- -- --    06/28/24 0125 -- 78 -- -- -- -- -- -- --    06/28/24 0121 -- 77 -- -- -- -- -- -- --    06/28/24 0117 -- 64 -- -- -- -- -- -- --    06/28/24 0115 -- 70 -- 115/61 -- -- -- -- --    06/28/24 0113 -- 67 -- -- -- -- -- -- --    06/28/24 0109 -- 77 -- -- -- -- -- -- --    06/28/24 0105 -- 107 -- -- -- -- -- -- --    06/28/24 0101 -- 102 -- -- -- -- -- -- --    06/28/24 0053 -- 66 -- -- -- -- -- -- --    06/28/24 0049 -- 72 -- -- -- -- -- -- --    06/28/24 0045 -- 93 -- -- -- -- -- -- --    06/28/24 0041 -- 79 -- -- -- -- -- -- --    06/28/24 0035 97.6 °F (36.4 °C) 99 18 116/57 97 % -- -- Lying No Pain    06/28/24 0033 -- 81 -- -- -- -- -- -- --    06/28/24 0030 -- 91 -- -- -- -- -- -- --    06/28/24 0029 -- 82 -- -- -- -- -- -- --    06/28/24 0025 -- 72 -- -- -- -- -- -- --    06/28/24 0021 -- 87 -- -- -- -- -- -- --    06/28/24 0017 -- 72 -- -- -- -- -- -- --    06/28/24 0015 -- 88 -- 107/57 -- -- -- -- --    06/28/24 0013 -- 73 -- -- -- -- -- -- --    06/28/24 0009 -- 72 -- -- -- -- -- -- --    06/28/24 0005 -- 70 -- -- -- -- -- -- --     06/28/24 0000 -- 65 -- 105/52 -- None (Room air) -- -- --    06/27/24 2357 -- 69 -- -- -- -- -- -- --    06/27/24 2353 -- 75 -- -- -- -- -- -- --    06/27/24 2349 -- 73 -- -- -- -- -- -- --    06/27/24 2345 -- 62 -- 114/57 -- -- -- -- --    06/27/24 2341 -- 61 -- -- -- -- -- -- --    06/27/24 2337 -- 72 -- -- -- -- -- -- --    06/27/24 2333 -- 62 -- -- -- -- -- -- --    06/27/24 2330 -- 68 -- 117/58 -- -- -- -- --    06/27/24 2329 -- 74 -- -- -- -- -- -- --    06/27/24 2325 -- 102 -- -- -- -- -- -- --    06/27/24 2321 -- 75 -- -- -- -- -- -- --    06/27/24 2317 -- 68 -- -- -- -- -- -- --    06/27/24 2315 -- 60 -- 123/56 -- -- -- -- 5    06/27/24 2306 -- 75 -- 124/56 -- -- -- -- --    06/27/24 2300 -- 65 -- 124/56 -- -- -- -- --    06/27/24 2245 -- 86 -- 135/74 -- -- -- -- --    06/27/24 2230 -- 74 -- 123/68 -- -- -- -- --    06/27/24 2216 -- 65 -- 126/70 -- -- -- -- --    06/27/24 2200 -- 74 -- 122/62 -- -- -- -- --    06/27/24 2145 98.2 °F (36.8 °C) 62 18 118/59 -- None (Room air) -- Lying --    06/27/24 2130 -- 66 -- 150/74 -- -- -- -- --    06/27/24 2115 -- 70 -- 127/79 -- -- -- -- 5    06/27/24 2100 -- 74 -- 145/79 -- -- -- -- --    06/27/24 2045 -- 68 -- 137/75 -- -- -- -- --    06/27/24 2031 -- 67 -- 142/69 -- -- -- -- --    06/27/24 2015 -- 68 -- 142/76 -- -- -- -- --    06/27/24 2001 -- 63 -- 161/76 -- -- -- -- --    06/27/24 1945 -- 84 -- 131/82 -- -- -- -- --    06/27/24 1930 -- 71 -- 136/72 -- -- -- -- --    06/27/24 1915 -- 111 -- 137/89 -- -- -- -- --    06/27/24 1846 -- 85 -- 143/84 -- -- -- -- --    06/27/24 1838 -- -- -- -- -- -- -- -- 1    06/27/24 1827 -- 71 -- 138/83 -- -- -- -- --    06/27/24 1733 97.6 °F (36.4 °C) 81 18 149/86 98 % -- -- Lying --    06/27/24 1256 97.5 °F (36.4 °C) 75 18 134/74 -- -- -- Sitting 2    06/27/24 1010 -- -- -- -- -- -- -- -- 8    06/27/24 0844 97.9 °F (36.6 °C) 95 18 141/84 -- -- -- Lying 8    06/27/24 0529 98.3 °F (36.8 °C) 64 18 139/75 98 % -- -- Lying No Pain     06/27/24 0131 -- -- -- -- -- -- -- -- --    OBSERV: pt allowed to rest at this time, next NST at 0800 per Dr. Cárdenas's note. Q4VS for GHTN at 06/27/24 0131 06/27/24 0100 -- 74 -- 140/79 -- -- -- -- --    06/26/24 2328 -- 86 -- 144/78 -- -- -- -- --    06/26/24 2230 -- 77 -- 135/83 -- -- -- -- --    06/26/24 2136 -- -- -- -- -- -- -- -- 9    06/26/24 2103 -- 67 -- 137/74 -- -- -- -- --    06/26/24 1830 -- 86 -- 138/81 -- -- -- -- --    06/26/24 1815 -- 81 -- 140/79 -- -- -- -- --    06/26/24 1800 -- 85 -- 154/87 -- -- -- -- --    06/26/24 1745 -- 88 -- 141/87 -- -- -- -- --    06/26/24 1730 -- 79 -- 134/83 -- -- -- -- --    06/26/24 1715 -- 82 -- 145/87 -- -- -- -- --    06/26/24 1700 -- 87 -- 141/89 -- -- -- -- --    06/26/24 1645 -- 83 -- 144/85 -- -- -- -- --    06/26/24 1630 -- 83 -- 147/90 -- -- -- -- --    06/26/24 1615 -- 86 -- 140/84 -- -- -- -- --    06/26/24 1556 -- 82 -- 143/85 -- -- -- -- --    06/26/24 1541 -- 82 -- 138/84 -- -- -- -- --    06/26/24 1526 -- 77 -- 143/85 -- -- -- -- --    06/26/24 1511 -- 76 -- 148/65 -- -- -- -- --    06/26/24 1456 -- 83 18 133/84 -- -- -- Sitting No Pain    06/26/24 1443 99.1 °F (37.3 °C) -- -- -- 98 % -- -- -- --          Weight (last 2 days)       Date/Time Weight    06/27/24 2145 95.3 (210)    06/27/24 0131 --    Comment rows:    OBSERV: pt allowed to rest at this time, next NST at 0800 per Dr. Cárdenas's note. Q4VS for GHTN at 06/27/24 0131              Pertinent Labs/Diagnostic Results:   Radiology:  No orders to display     Cardiology:  No orders to display     GI:  No orders to display           Results from last 7 days   Lab Units 06/26/24  1441   WBC Thousand/uL 14.35*   HEMOGLOBIN g/dL 12.9   HEMATOCRIT % 39.0   PLATELETS Thousands/uL 221         Results from last 7 days   Lab Units 06/26/24  1441   SODIUM mmol/L 135   POTASSIUM mmol/L 3.5   CHLORIDE mmol/L 103   CO2 mmol/L 25   ANION GAP mmol/L 7   BUN mg/dL 9   CREATININE mg/dL 0.86   EGFR ml/min/1.73sq m 87  "  CALCIUM mg/dL 10.7*     Results from last 7 days   Lab Units 06/26/24  1441   AST U/L 20   ALT U/L 13   ALK PHOS U/L 230*   TOTAL PROTEIN g/dL 6.3*   ALBUMIN g/dL 3.7   TOTAL BILIRUBIN mg/dL 0.31         Results from last 7 days   Lab Units 06/26/24  1441   GLUCOSE RANDOM mg/dL 91             No results found for: \"BETA-HYDROXYBUTYRATE\"                                                                                       Results from last 7 days   Lab Units 06/26/24  1441   CREATININE UR mg/dL 49.5   PROTEIN UR mg/dL 8   PROT/CREAT RATIO UR  0.16*             Medications:   Scheduled Medications:  predniSONE, 20 mg, Oral, Daily  sertraline, 50 mg, Oral, Daily      Continuous IV Infusions:  lactated ringers, 50 mL/hr, Intravenous, Continuous  magnesium sulfate, 2 g/hr, Intravenous, Continuous  oxytocin, 1-30 lc-units/min, Intravenous, Titrated  ropivacaine 0.2%, , Epidural, Continuous      PRN Meds:  acetaminophen, 975 mg, Oral, Q8H PRN  bupivacaine (PF), 30 mL, Infiltration, Once PRN  calcium carbonate, 1,000 mg, Oral, TID PRN  calcium gluconate, 1 g, Intravenous, Once PRN  metoclopramide, 5 mg, Intravenous, Q6H PRN  ondansetron, 4 mg, Intravenous, Q6H PRN        Discharge Plan: [facility] Utilization Review Department  ATTENTION: Please call with any questions or concerns to [phone] and carefully listen to the prompts so that you are directed to the right person. All voicemails are confidential.   For Discharge needs, contact Care Management DC Support Team at [phone] opt. [phone]  Send all requests for admission clinical reviews, approved or denied determinations and any other requests to dedicated fax number below belonging to the campus where the patient is receiving treatment. List of dedicated fax numbers for the Facilities:  FACILITY NAME UR FAX NUMBER   ADMISSION DENIALS (Administrative/Medical Necessity) [phone]   DISCHARGE SUPPORT TEAM (NETWORK) [phone]   PARENT CHILD HEALTH " (Maternity/NICU/Pediatrics) [phone]   [facility] [phone]   [facility] [phone]   [facility] [phone]   [facility] [phone]   [facility] [phone]   [facility] [phone]   [facility] [phone]   [facility] [phone]   [facility] [phone]   [facility] [phone]   [facility] [phone]   [facility] [phone]

## 2024-06-28 NOTE — PLAN OF CARE
Problem: BIRTH - VAGINAL/ SECTION  Goal: Fetal and maternal status remain reassuring during the birth process  Description: INTERVENTIONS:  - Monitor vital signs  - Monitor fetal heart rate  - Monitor uterine activity  - Monitor labor progression (vaginal delivery)  - DVT prophylaxis  - Antibiotic prophylaxis  Outcome: Progressing  Goal: Emotionally satisfying birthing experience for mother/fetus  Description: Interventions:  - Assess, plan, implement and evaluate the nursing care given to the patient in labor  - Advocate the philosophy that each childbirth experience is a unique experience and support the family's chosen level of involvement and control during the labor process   - Actively participate in both the patient's and family's teaching of the birth process  - Consider cultural, [place] and age-specific factors and plan care for the patient in labor  Outcome: Progressing     Problem: Knowledge Deficit  Goal: Verbalizes understanding of labor plan  Description: Assess patient/family/caregiver's baseline knowledge level and ability to understand information.  Provide education via patient/family/caregiver's preferred learning method at appropriate level of understanding.     1. Provide teaching at level of understanding.  2. Provide teaching via preferred learning method(s).  Outcome: Progressing  Goal: Patient/family/caregiver demonstrates understanding of disease process, treatment plan, medications, and discharge instructions  Description: Complete learning assessment and assess knowledge base.  Interventions:  - Provide teaching at level of understanding  - Provide teaching via preferred learning methods  Outcome: Progressing     Problem: Labor & Delivery  Goal: Manages discomfort  Description: Assess and monitor for signs and symptoms of discomfort.  Assess patient's pain level regularly and per hospital policy.  Administer medications as ordered. Support use of nonpharmacological methods to  help control pain such as distraction, imagery, relaxation, and application of heat and cold.  Collaborate with interdisciplinary team and patient to determine appropriate pain management plan.    1. Include patient in decisions related to comfort.  2. Offer non-pharmacological pain management interventions.  3. Report ineffective pain management to physician.  Outcome: Progressing  Goal: Patient vital signs are stable  Description: 1. Assess vital signs - vaginal delivery.  Outcome: Progressing     Problem: PAIN - ADULT  Goal: Verbalizes/displays adequate comfort level or baseline comfort level  Description: Interventions:  - Encourage patient to monitor pain and request assistance  - Assess pain using appropriate pain scale  - Administer analgesics based on type and severity of pain and evaluate response  - Implement non-pharmacological measures as appropriate and evaluate response  - Consider cultural and social influences on pain and pain management  - Notify physician/advanced practitioner if interventions unsuccessful or patient reports new pain  Outcome: Progressing     Problem: INFECTION - ADULT  Goal: Absence or prevention of progression during hospitalization  Description: INTERVENTIONS:  - Assess and monitor for signs and symptoms of infection  - Monitor lab/diagnostic results  - Monitor all insertion sites, i.e. indwelling lines, tubes, and drains  - Monitor endotracheal if appropriate and nasal secretions for changes in amount and color  - [place] appropriate cooling/warming therapies per order  - Administer medications as ordered  - Instruct and encourage patient and family to use good hand hygiene technique  - Identify and instruct in appropriate isolation precautions for identified infection/condition  Outcome: Progressing  Goal: Absence of fever/infection during neutropenic period  Description: INTERVENTIONS:  - Monitor WBC    Outcome: Progressing     Problem: SAFETY ADULT  Goal: Patient will  remain free of falls  Description: INTERVENTIONS:  - Educate patient/family on patient safety including physical limitations  - Instruct patient to call for assistance with activity   - Consult OT/PT to assist with strengthening/mobility   - Keep Call bell within reach  - Keep bed low and locked with side rails adjusted as appropriate  - Keep care items and personal belongings within reach  - Initiate and maintain comfort rounds  - Make Fall Risk Sign visible to staff  - Offer Toileting every  Hours, in advance of need  - Initiate/Maintain alarm  - Obtain necessary fall risk management equipment:   - Apply yellow socks and bracelet for high fall risk patients  - Consider moving patient to room near nurses station  Outcome: Progressing  Goal: Maintain or return to baseline ADL function  Description: INTERVENTIONS:  -  Assess patient's ability to carry out ADLs; assess patient's baseline for ADL function and identify physical deficits which impact ability to perform ADLs (bathing, care of mouth/teeth, toileting, grooming, dressing, etc.)  - Assess/evaluate cause of self-care deficits   - Assess range of motion  - Assess patient's mobility; develop plan if impaired  - Assess patient's need for assistive devices and provide as appropriate  - Encourage maximum independence but intervene and supervise when necessary  - Involve family in performance of ADLs  - Assess for home care needs following discharge   - Consider OT consult to assist with ADL evaluation and planning for discharge  - Provide patient education as appropriate  Outcome: Progressing  Goal: Maintains/Returns to pre admission functional level  Description: INTERVENTIONS:  - Perform AM-PAC 6 Click Basic Mobility/ Daily Activity assessment daily.  - Set and communicate daily mobility goal to care team and patient/family/caregiver.   - Collaborate with rehabilitation services on mobility goals if consulted  - Perform Range of Motion  times a day.  - Reposition  patient every  hours.  - Dangle patient  times a day  - Stand patient  times a day  - Ambulate patient  times a day  - Out of bed to chair  times a day   - Out of bed for meals  times a day  - Out of bed for toileting  - Record patient progress and toleration of activity level   Outcome: Progressing     Problem: DISCHARGE PLANNING  Goal: Discharge to home or other facility with appropriate resources  Description: INTERVENTIONS:  - Identify barriers to discharge w/patient and caregiver  - Arrange for needed discharge resources and transportation as appropriate  - Identify discharge learning needs (meds, wound care, etc.)  - Arrange for interpretive services to assist at discharge as needed  - Refer to Case Management Department for coordinating discharge planning if the patient needs post-hospital services based on physician/advanced practitioner order or complex needs related to functional status, cognitive ability, or social support system  Outcome: Progressing

## 2024-06-28 NOTE — PLAN OF CARE
Problem: BIRTH - VAGINAL/ SECTION  Goal: Fetal and maternal status remain reassuring during the birth process  Description: INTERVENTIONS:  - Monitor vital signs  - Monitor fetal heart rate  - Monitor uterine activity  - Monitor labor progression (vaginal delivery)  - DVT prophylaxis  - Antibiotic prophylaxis  2024 by Jennifer Kothari RN  Outcome: Progressing  2024 by Jennifer Kothari RN  Outcome: Progressing  Goal: Emotionally satisfying birthing experience for mother/fetus  Description: Interventions:  - Assess, plan, implement and evaluate the nursing care given to the patient in labor  - Advocate the philosophy that each childbirth experience is a unique experience and support the family's chosen level of involvement and control during the labor process   - Actively participate in both the patient's and family's teaching of the birth process  - Consider cultural, L[employer] and age-specific factors and plan care for the patient in labor  2024 by Jennifer Kothari RN  Outcome: Progressing  2024 by Jennifer Kothari RN  Outcome: Progressing     Problem: Knowledge Deficit  Goal: Verbalizes understanding of labor plan  Description: Assess patient/family/caregiver's baseline knowledge level and ability to understand information.  Provide education via patient/family/caregiver's preferred learning method at appropriate level of understanding.     1. Provide teaching at level of understanding.  2. Provide teaching via preferred learning method(s).  2024 by Jennifer Kothari RN  Outcome: Progressing  2024 by Jennifer Kothari RN  Outcome: Progressing  Goal: Patient/family/caregiver demonstrates understanding of disease process, treatment plan, medications, and discharge instructions  Description: Complete learning assessment and assess knowledge base.  Interventions:  - Provide teaching at level of understanding  - Provide teaching via preferred learning methods  2024 by Jennifer  KWAME Kothari  Outcome: Progressing  6/27/2024 1848 by Jennifer Kothari RN  Outcome: Progressing     Problem: Labor & Delivery  Goal: Manages discomfort  Description: Assess and monitor for signs and symptoms of discomfort.  Assess patient's pain level regularly and per hospital policy.  Administer medications as ordered. Support use of nonpharmacological methods to help control pain such as distraction, imagery, relaxation, and application of heat and cold.  Collaborate with interdisciplinary team and patient to determine appropriate pain management plan.    1. Include patient in decisions related to comfort.  2. Offer non-pharmacological pain management interventions.  3. Report ineffective pain management to physician.  6/27/2024 2049 by Jennifer Kothari RN  Outcome: Progressing  6/27/2024 1848 by Jennifer Kothari RN  Outcome: Progressing  Goal: Patient vital signs are stable  Description: 1. Assess vital signs - vaginal delivery.  6/27/2024 2049 by Jennifer Kothari RN  Outcome: Progressing  6/27/2024 1848 by Jennifer Kothari RN  Outcome: Progressing     Problem: PAIN - ADULT  Goal: Verbalizes/displays adequate comfort level or baseline comfort level  Description: Interventions:  - Encourage patient to monitor pain and request assistance  - Assess pain using appropriate pain scale  - Administer analgesics based on type and severity of pain and evaluate response  - Implement non-pharmacological measures as appropriate and evaluate response  - Consider cultural and social influences on pain and pain management  - Notify physician/advanced practitioner if interventions unsuccessful or patient reports new pain  6/27/2024 2049 by Jennifer Kothari RN  Outcome: Progressing  6/27/2024 1848 by Jennifer Kothari RN  Outcome: Progressing     Problem: INFECTION - ADULT  Goal: Absence or prevention of progression during hospitalization  Description: INTERVENTIONS:  - Assess and monitor for signs and symptoms of infection  - Monitor lab/diagnostic results  - Monitor  all insertion sites, i.e. indwelling lines, tubes, and drains  - Monitor endotracheal if appropriate and nasal secretions for changes in amount and color  - [place] appropriate cooling/warming therapies per order  - Administer medications as ordered  - Instruct and encourage patient and family to use good hand hygiene technique  - Identify and instruct in appropriate isolation precautions for identified infection/condition  6/27/2024 2049 by Jennifer Kothari RN  Outcome: Progressing  6/27/2024 1848 by Jennifer Kothari RN  Outcome: Progressing  Goal: Absence of fever/infection during neutropenic period  Description: INTERVENTIONS:  - Monitor WBC    6/27/2024 2049 by Jennifer Kothari RN  Outcome: Progressing  6/27/2024 1848 by Jennifer Kothari RN  Outcome: Progressing     Problem: SAFETY ADULT  Goal: Patient will remain free of falls  Description: INTERVENTIONS:  - Educate patient/family on patient safety including physical limitations  - Instruct patient to call for assistance with activity   - Consult OT/PT to assist with strengthening/mobility   - Keep Call bell within reach  - Keep bed low and locked with side rails adjusted as appropriate  - Keep care items and personal belongings within reach  - Initiate and maintain comfort rounds  - Make Fall Risk Sign visible to staff  - Apply yellow socks and bracelet for high fall risk patients  - Consider moving patient to room near nurses station  6/27/2024 2049 by Jennifer Kothari RN  Outcome: Progressing  6/27/2024 1848 by Jennifer Kothari RN  Outcome: Progressing  Goal: Maintain or return to baseline ADL function  Description: INTERVENTIONS:  -  Assess patient's ability to carry out ADLs; assess patient's baseline for ADL function and identify physical deficits which impact ability to perform ADLs (bathing, care of mouth/teeth, toileting, grooming, dressing, etc.)  - Assess/evaluate cause of self-care deficits   - Assess range of motion  - Assess patient's mobility; develop plan if impaired  -  Assess patient's need for assistive devices and provide as appropriate  - Encourage maximum independence but intervene and supervise when necessary  - Involve family in performance of ADLs  - Assess for home care needs following discharge   - Consider OT consult to assist with ADL evaluation and planning for discharge  - Provide patient education as appropriate  6/27/2024 2049 by Jennifer Kothari RN  Outcome: Progressing  6/27/2024 1848 by Jennifer Kothari RN  Outcome: Progressing  Goal: Maintains/Returns to pre admission functional level  Description: INTERVENTIONS:  - Perform AM-PAC 6 Click Basic Mobility/ Daily Activity assessment daily.  - Set and communicate daily mobility goal to care team and patient/family/caregiver.   - Collaborate with rehabilitation services on mobility goals if consulted  - Out of bed for toileting  - Record patient progress and toleration of activity level   6/27/2024 2049 by Jennifer Kothari RN  Outcome: Progressing  6/27/2024 1848 by Jennifer Kothari RN  Outcome: Progressing     Problem: DISCHARGE PLANNING  Goal: Discharge to home or other facility with appropriate resources  Description: INTERVENTIONS:  - Identify barriers to discharge w/patient and caregiver  - Arrange for needed discharge resources and transportation as appropriate  - Identify discharge learning needs (meds, wound care, etc.)  - Arrange for interpretive services to assist at discharge as needed  - Refer to Case Management Department for coordinating discharge planning if the patient needs post-hospital services based on physician/advanced practitioner order or complex needs related to functional status, cognitive ability, or social support system  6/27/2024 2049 by Jennifer Kothari RN  Outcome: Progressing  6/27/2024 1848 by Jennifer Kothari RN  Outcome: Progressing

## 2024-06-28 NOTE — PLAN OF CARE
Problem: BIRTH - VAGINAL/ SECTION  Goal: Fetal and maternal status remain reassuring during the birth process  Description: INTERVENTIONS:  - Monitor vital signs  - Monitor fetal heart rate  - Monitor uterine activity  - Monitor labor progression (vaginal delivery)  - DVT prophylaxis  - Antibiotic prophylaxis  Outcome: Progressing  Goal: Emotionally satisfying birthing experience for mother/fetus  Description: Interventions:  - Assess, plan, implement and evaluate the nursing care given to the patient in labor  - Advocate the philosophy that each childbirth experience is a unique experience and support the family's chosen level of involvement and control during the labor process   - Actively participate in both the patient's and family's teaching of the birth process  - Consider cultural, [employer]ism and age-specific factors and plan care for the patient in labor  Outcome: Progressing     Problem: Knowledge Deficit  Goal: Verbalizes understanding of labor plan  Description: Assess patient/family/caregiver's baseline knowledge level and ability to understand information.  Provide education via patient/family/caregiver's preferred learning method at appropriate level of understanding.     1. Provide teaching at level of understanding.  2. Provide teaching via preferred learning method(s).  Outcome: Progressing  Goal: Patient/family/caregiver demonstrates understanding of disease process, treatment plan, medications, and discharge instructions  Description: Complete learning assessment and assess knowledge base.  Interventions:  - Provide teaching at level of understanding  - Provide teaching via preferred learning methods  Outcome: Progressing     Problem: Labor & Delivery  Goal: Manages discomfort  Description: Assess and monitor for signs and symptoms of discomfort.  Assess patient's pain level regularly and per hospital policy.  Administer medications as ordered. Support use of nonpharmacological methods to  help control pain such as distraction, imagery, relaxation, and application of heat and cold.  Collaborate with interdisciplinary team and patient to determine appropriate pain management plan.    1. Include patient in decisions related to comfort.  2. Offer non-pharmacological pain management interventions.  3. Report ineffective pain management to physician.  Outcome: Progressing  Goal: Patient vital signs are stable  Description: 1. Assess vital signs - vaginal delivery.  Outcome: Progressing     Problem: PAIN - ADULT  Goal: Verbalizes/displays adequate comfort level or baseline comfort level  Description: Interventions:  - Encourage patient to monitor pain and request assistance  - Assess pain using appropriate pain scale  - Administer analgesics based on type and severity of pain and evaluate response  - Implement non-pharmacological measures as appropriate and evaluate response  - Consider cultural and social influences on pain and pain management  - Notify physician/advanced practitioner if interventions unsuccessful or patient reports new pain  Outcome: Progressing     Problem: INFECTION - ADULT  Goal: Absence or prevention of progression during hospitalization  Description: INTERVENTIONS:  - Assess and monitor for signs and symptoms of infection  - Monitor lab/diagnostic results  - Monitor all insertion sites, i.e. indwelling lines, tubes, and drains  - Monitor endotracheal if appropriate and nasal secretions for changes in amount and color  - [place] appropriate cooling/warming therapies per order  - Administer medications as ordered  - Instruct and encourage patient and family to use good hand hygiene technique  - Identify and instruct in appropriate isolation precautions for identified infection/condition  Outcome: Progressing  Goal: Absence of fever/infection during neutropenic period  Description: INTERVENTIONS:  - Monitor WBC    Outcome: Progressing     Problem: SAFETY ADULT  Goal: Patient will  remain free of falls  Description: INTERVENTIONS:  - Educate patient/family on patient safety including physical limitations  - Instruct patient to call for assistance with activity   - Consult OT/PT to assist with strengthening/mobility   - Keep Call bell within reach  - Keep bed low and locked with side rails adjusted as appropriate  - Keep care items and personal belongings within reach  - Initiate and maintain comfort rounds  - Make Fall Risk Sign visible to staff  - Apply yellow socks and bracelet for high fall risk patients  - Consider moving patient to room near nurses station  Outcome: Progressing  Goal: Maintain or return to baseline ADL function  Description: INTERVENTIONS:  -  Assess patient's ability to carry out ADLs; assess patient's baseline for ADL function and identify physical deficits which impact ability to perform ADLs (bathing, care of mouth/teeth, toileting, grooming, dressing, etc.)  - Assess/evaluate cause of self-care deficits   - Assess range of motion  - Assess patient's mobility; develop plan if impaired  - Assess patient's need for assistive devices and provide as appropriate  - Encourage maximum independence but intervene and supervise when necessary  - Involve family in performance of ADLs  - Assess for home care needs following discharge   - Consider OT consult to assist with ADL evaluation and planning for discharge  - Provide patient education as appropriate  Outcome: Progressing  Goal: Maintains/Returns to pre admission functional level  Description: INTERVENTIONS:  - Perform AM-PAC 6 Click Basic Mobility/ Daily Activity assessment daily.  - Set and communicate daily mobility goal to care team and patient/family/caregiver.   - Collaborate with rehabilitation services on mobility goals if consulted  - Out of bed for toileting  - Record patient progress and toleration of activity level   Outcome: Progressing     Problem: DISCHARGE PLANNING  Goal: Discharge to home or other facility  with appropriate resources  Description: INTERVENTIONS:  - Identify barriers to discharge w/patient and caregiver  - Arrange for needed discharge resources and transportation as appropriate  - Identify discharge learning needs (meds, wound care, etc.)  - Arrange for interpretive services to assist at discharge as needed  - Refer to Case Management Department for coordinating discharge planning if the patient needs post-hospital services based on physician/advanced practitioner order or complex needs related to functional status, cognitive ability, or social support system  Outcome: Progressing

## 2024-06-28 NOTE — L&D DELIVERY NOTE
Vaginal Delivery Summary - OB/GYN   Priya Johnston 38 y.o. female MRN: [id number]  Unit/Bed#: -01 Encounter: [id number]    Pre-delivery Diagnosis:   1. Pregnancy at 37 weeks   2. Preeclampsia with severe features   3. Anxiety   4. History of migraines with aura     Post-delivery Diagnosis: same, delivered    Procedure: Spontaneous Vaginal Delivery     Attending: Dr. Gray    Assistant(s): Dr. Tilley    Anesthesia: Epidural    QBL: 238 cc    Complications: none apparent    Specimens:   1. Arterial and venous cord gases  2. Cord blood  3. Segment of umbilical cord  4. Placenta to storage     Findings:  1. Viable male on 2024 at 1444, with APGARS of 9 and 9 at 1 and 5 minutes respectively,  2. Spontaneous delivery of intact placenta at 1449  3. First degree laceration repaired with 3-0 vicryl rapide  4. Umbilical Cord Venous Blood Gas:  Results from last 7 days   Lab Units 24  1450   PH COV  7.319   PCO2 COV mm HG 38.4   HCO3 COV mmol/L 19.3   BASE EXC COV mmol/L -6.2*   O2 CT CD VB mL/dL 12.6   O2 HGB, VENOUS CORD % 60.9     5. Umbilical Cord Arterial Blood Gas:  Results from last 7 days   Lab Units 24  1450   PH COA  7.253   PCO2 COA  52.0   PO2 COA mm HG 22.1   HCO3 COA mmol/L 22.4   BASE EXC COA mmol/L -5.2*   O2 CONTENT CORD ART ml/dl 8.5   O2 HGB, ARTERIAL CORD % 41.4       Disposition:  Patient tolerated the procedure well and was recovering in labor and delivery room       Brief history and labor course:  Priya Johnston is a 38 y.o. now  at 37w6d wks who was initially admitted for induction of labor in setting of gestational hypertension. Admission CBC and CMP were wnl; urine PC was 0.16. Pregnancy was complicated by gHTN, anxiety, and migraines. On presentation, SVE was 1/50/-4 and FB was placed. Pitocin titration was started. She met criteria for preeclampsia with severe features due to persistent headache and magnesium gtt was started. Amniotomy was performed for clear  fluid. She received an epidural. She progressed to complete and began pushing.      Description of procedure:  After pushing for 48 minutes, at 1444 patient delivered a viable male , wt pending, apgars of 9 (1 min) and 9 (5 min). The fetal vertex delivered spontaneously. There was no nuchal cord. Baby was OA, restituted to REJI. The left anterior shoulder delivered atraumatically with maternal expulsive forces and the assistance of gentle downward traction. The right posterior shoulder delivered with maternal expulsive forces and the assistance of gentle upward traction. The remainder of the fetus delivered spontaneously.     Upon delivery, the infant was placed on the mothers abdomen and the cord was clamped and cut. The infant was noted to cry spontaneously and was moving all extremities appropriately. There was no evidence for injury. Awaiting nurse resuscitators evaluated the . Arterial and venous cord blood gases and cord blood was collected for analysis. These were promptly sent to the lab. In the immediate post-partum, 30 units of IV pitocin was administered, and the uterus was noted to contract down well with massage and pitocin. The placenta delivered spontaneously at 1449 and was noted to have an eccentrically inserted 3 vessel cord.     The vagina, cervix, perineum, and rectum were inspected and there was noted to be a first degree laceration requiring repair. Patient was comfortable with epidural at that time. Laceration was repaired in standard fashion with 3-0 Vicryl rapid to reapproximate the laceration. Good reapproximation and hemostasis was confirmed at the conclusion of this procedure.    At the conclusion of the procedure, all needle, sponge, and instrument counts were noted to be correct. Patient tolerated the procedure well and was allowed to recover in labor and delivery room with family and  before being transferred to the post-partum floor. Dr. Gray was present and  participated in all key portions of the case.    Amanda Tilley MD  OB/GYN PGY-1  6/28/2024  3:16 PM

## 2024-06-28 NOTE — OB LABOR/OXYTOCIN SAFETY PROGRESS
Oxytocin Safety Progress Check Note - Priya Johnston 38 y.o. female MRN: [id number]    Unit/Bed#: -01 Encounter: [id number]    Dose (lc-units/min) Oxytocin: 16 lc-units/min  Contraction Frequency (minutes): difficult trace  Contraction Intensity: Moderate  Uterine Activity Characteristics: Regular  Cervical Dilation: 5-6        Cervical Effacement: 90  Fetal Station: -1  Baseline Rate (FHR): 140 bpm  Fetal Heart Rate (FHT): 132 BPM  FHR Category: 1               Vital Signs:   Vitals:    06/28/24 0943   BP: 125/74   Pulse: 84   Resp:    Temp:    SpO2:        Notes/comments:   Patient still uncomfortable with contractions. Having pressure and pain in suprapubic area. Anesthesia with patient to review pain and possible bolus.   Recheck in 2-3 hours or sooner if clinically indicated.   If variables become recurrent again, consider IUPC.       Cesia Gray MD 6/28/2024 10:45 AM

## 2024-06-28 NOTE — OB LABOR/OXYTOCIN SAFETY PROGRESS
Oxytocin Safety Progress Check Note - Priya Johnston 38 y.o. female MRN: [id number]    Unit/Bed#: -01 Encounter: [id number]    Dose (lc-units/min) Oxytocin: 8 lc-units/min  Contraction Frequency (minutes): 2-6  Contraction Intensity: Mild  Uterine Activity Characteristics: Irregular  Cervical Dilation: 4-5        Cervical Effacement: 50  Fetal Station: -2  Baseline Rate (FHR): 130 bpm  Fetal Heart Rate (FHT): 129 BPM  FHR Category: I               Vital Signs:   Vitals:    06/28/24 0345   BP: 136/82   Pulse: 87   Resp:    Temp:    SpO2:        Notes/comments:   Patient is feeling well at this time.  She has a mild headache with the magnesium.  Tylenol has been helpful.  She is due for another dose and we will give that now.  Amniotomy performed for clear fluid.  FHT category 1.  Continue Pitocin titration.    Jessica Cárdenas MD 6/28/2024 3:53 AM

## 2024-06-28 NOTE — OB LABOR/OXYTOCIN SAFETY PROGRESS
Oxytocin Safety Progress Check Note - Priya Johnston 38 y.o. female MRN: [id number]    Unit/Bed#: -01 Encounter: [id number]    Dose (lc-units/min) Oxytocin: 16 lc-units/min  Contraction Frequency (minutes): 2-4  Contraction Intensity: Mild/Moderate  Uterine Activity Characteristics: Regular  Cervical Dilation: 5-6        Cervical Effacement: 90  Fetal Station: -1  Baseline Rate (FHR): 140 bpm  Fetal Heart Rate (FHT): 140 BPM  FHR Category: 1               Vital Signs:   Vitals:    06/28/24 0700   BP: 141/81   Pulse: 80   Resp:    Temp:    SpO2:        Notes/comments:   Patient still having discomfort with contractions despite epidural in place.   Effacement noted on exam.   Continue pitocin titration. Patient would like to nap if able.   Recheck in 2-3 hours or sooner if clinically indicated.       Cesia Gray MD 6/28/2024 7:59 AM

## 2024-06-28 NOTE — ANESTHESIA PREPROCEDURE EVALUATION
Procedure:  LABOR ANALGESIA    Relevant Problems   CARDIO   (+) Gestational hypertension   (+) Migraine with aura and without status migrainosus, not intractable      GYN   (+) 37 weeks gestation of pregnancy      NEURO/PSYCH   (+) Migraine with aura and without status migrainosus, not intractable        Physical Exam    Airway    Mallampati score: II         Dental       Cardiovascular  Cardiovascular exam normal    Pulmonary  Pulmonary exam normal     Other Findings  post-pubertal.      Anesthesia Plan  ASA Score- 2     Anesthesia Type- epidural with ASA Monitors.         Additional Monitors:     Airway Plan:            Plan Factors-Exercise tolerance (METS): >4 METS.    Chart reviewed.   Existing labs reviewed.                   Induction-     Postoperative Plan-     Perioperative Resuscitation Plan - Level 1 - Full Code.       Informed Consent- Anesthetic plan and risks discussed with patient.

## 2024-06-29 PROBLEM — O14.10 PREECLAMPSIA, SEVERE: Status: ACTIVE | Noted: 2024-06-26

## 2024-06-29 PROCEDURE — 99024 POSTOP FOLLOW-UP VISIT: CPT | Performed by: OBSTETRICS & GYNECOLOGY

## 2024-06-29 RX ORDER — NIFEDIPINE 30 MG/1
30 TABLET, EXTENDED RELEASE ORAL DAILY
Status: DISCONTINUED | OUTPATIENT
Start: 2024-06-29 | End: 2024-06-30 | Stop reason: HOSPADM

## 2024-06-29 RX ADMIN — IBUPROFEN 600 MG: 600 TABLET, FILM COATED ORAL at 00:08

## 2024-06-29 RX ADMIN — DOCUSATE SODIUM 100 MG: 100 CAPSULE, LIQUID FILLED ORAL at 18:29

## 2024-06-29 RX ADMIN — SENNOSIDES 8.6 MG: 8.6 TABLET, FILM COATED ORAL at 09:06

## 2024-06-29 RX ADMIN — PREDNISONE 20 MG: 20 TABLET ORAL at 09:06

## 2024-06-29 RX ADMIN — DOCUSATE SODIUM 100 MG: 100 CAPSULE, LIQUID FILLED ORAL at 09:06

## 2024-06-29 RX ADMIN — SERTRALINE HYDROCHLORIDE 50 MG: 50 TABLET ORAL at 09:06

## 2024-06-29 RX ADMIN — NIFEDIPINE 30 MG: 30 TABLET, EXTENDED RELEASE ORAL at 11:15

## 2024-06-29 NOTE — PLAN OF CARE
Problem: BIRTH - VAGINAL/ SECTION  Goal: Fetal and maternal status remain reassuring during the birth process  Description: INTERVENTIONS:  - Monitor vital signs  - Monitor fetal heart rate  - Monitor uterine activity  - Monitor labor progression (vaginal delivery)  - DVT prophylaxis  - Antibiotic prophylaxis  Outcome: Completed  Goal: Emotionally satisfying birthing experience for mother/fetus  Description: Interventions:  - Assess, plan, implement and evaluate the nursing care given to the patient in labor  - Advocate the philosophy that each childbirth experience is a unique experience and support the family's chosen level of involvement and control during the labor process   - Actively participate in both the patient's and family's teaching of the birth process  - Consider cultural, Yazidism and age-specific factors and plan care for the patient in labor  Outcome: Completed     Problem: POSTPARTUM  Goal: Experiences normal postpartum course  Description: INTERVENTIONS:  - Monitor maternal vital signs  - Assess uterine involution and lochia  Outcome: Progressing  Goal: Appropriate maternal -  bonding  Description: INTERVENTIONS:  - Identify family support  - Assess for appropriate maternal/infant bonding   -Encourage maternal/infant bonding opportunities  - Referral to  or  as needed  Outcome: Progressing  Goal: Establishment of infant feeding pattern  Description: INTERVENTIONS:  - Assess breast/bottle feeding  - Refer to lactation as needed  Outcome: Progressing  Goal: Incision(s), wounds(s) or drain site(s) healing without S/S of infection  Description: INTERVENTIONS  - Assess and document dressing, incision, wound bed, drain sites and surrounding tissue  - Provide patient and family education  Outcome: Progressing     Problem: Knowledge Deficit  Goal: Verbalizes understanding of labor plan  Description: Assess patient/family/caregiver's baseline knowledge level and  ability to understand information.  Provide education via patient/family/caregiver's preferred learning method at appropriate level of understanding.     1. Provide teaching at level of understanding.  2. Provide teaching via preferred learning method(s).  Outcome: Completed  Goal: Patient/family/caregiver demonstrates understanding of disease process, treatment plan, medications, and discharge instructions  Description: Complete learning assessment and assess knowledge base.  Interventions:  - Provide teaching at level of understanding  - Provide teaching via preferred learning methods  Outcome: Completed     Problem: Labor & Delivery  Goal: Manages discomfort  Description: Assess and monitor for signs and symptoms of discomfort.  Assess patient's pain level regularly and per hospital policy.  Administer medications as ordered. Support use of nonpharmacological methods to help control pain such as distraction, imagery, relaxation, and application of heat and cold.  Collaborate with interdisciplinary team and patient to determine appropriate pain management plan.    1. Include patient in decisions related to comfort.  2. Offer non-pharmacological pain management interventions.  3. Report ineffective pain management to physician.  Outcome: Completed  Goal: Patient vital signs are stable  Description: 1. Assess vital signs - vaginal delivery.  Outcome: Completed     Problem: PAIN - ADULT  Goal: Verbalizes/displays adequate comfort level or baseline comfort level  Description: Interventions:  - Encourage patient to monitor pain and request assistance  - Assess pain using appropriate pain scale  - Administer analgesics based on type and severity of pain and evaluate response  - Implement non-pharmacological measures as appropriate and evaluate response  - Consider cultural and social influences on pain and pain management  - Notify physician/advanced practitioner if interventions unsuccessful or patient reports new  pain  Outcome: Progressing     Problem: INFECTION - ADULT  Goal: Absence or prevention of progression during hospitalization  Description: INTERVENTIONS:  - Assess and monitor for signs and symptoms of infection  - Monitor lab/diagnostic results  - Monitor all insertion sites, i.e. indwelling lines, tubes, and drains  - Monitor endotracheal if appropriate and nasal secretions for changes in amount and color  - Damon appropriate cooling/warming therapies per order  - Administer medications as ordered  - Instruct and encourage patient and family to use good hand hygiene technique  - Identify and instruct in appropriate isolation precautions for identified infection/condition  Outcome: Progressing  Goal: Absence of fever/infection during neutropenic period  Description: INTERVENTIONS:  - Monitor WBC    Outcome: Progressing     Problem: SAFETY ADULT  Goal: Patient will remain free of falls  Description: INTERVENTIONS:  - Educate patient/family on patient safety including physical limitations  - Instruct patient to call for assistance with activity   - Consult OT/PT to assist with strengthening/mobility   - Keep Call bell within reach  - Keep bed low and locked with side rails adjusted as appropriate  - Keep care items and personal belongings within reach  - Initiate and maintain comfort rounds  - Make Fall Risk Sign visible to staff  - Apply yellow socks and bracelet for high fall risk patients  - Consider moving patient to room near nurses station  Outcome: Progressing  Goal: Maintain or return to baseline ADL function  Description: INTERVENTIONS:  -  Assess patient's ability to carry out ADLs; assess patient's baseline for ADL function and identify physical deficits which impact ability to perform ADLs (bathing, care of mouth/teeth, toileting, grooming, dressing, etc.)  - Assess/evaluate cause of self-care deficits   - Assess range of motion  - Assess patient's mobility; develop plan if impaired  - Assess  patient's need for assistive devices and provide as appropriate  - Encourage maximum independence but intervene and supervise when necessary  - Involve family in performance of ADLs  - Assess for home care needs following discharge   - Consider OT consult to assist with ADL evaluation and planning for discharge  - Provide patient education as appropriate  Outcome: Progressing  Goal: Maintains/Returns to pre admission functional level  Description: INTERVENTIONS:  - Perform AM-PAC 6 Click Basic Mobility/ Daily Activity assessment daily.  - Set and communicate daily mobility goal to care team and patient/family/caregiver.   - Collaborate with rehabilitation services on mobility goals if consulted  - Out of bed for toileting  - Record patient progress and toleration of activity level   Outcome: Progressing     Problem: DISCHARGE PLANNING  Goal: Discharge to home or other facility with appropriate resources  Description: INTERVENTIONS:  - Identify barriers to discharge w/patient and caregiver  - Arrange for needed discharge resources and transportation as appropriate  - Identify discharge learning needs (meds, wound care, etc.)  - Arrange for interpretive services to assist at discharge as needed  - Refer to Case Management Department for coordinating discharge planning if the patient needs post-hospital services based on physician/advanced practitioner order or complex needs related to functional status, cognitive ability, or social support system  Outcome: Progressing

## 2024-06-29 NOTE — PROGRESS NOTES
"Obstetrics Progress Note  Priya Johnston 38 y.o. female MRN: [id number]  Unit/Bed#: -01 Encounter: [id number]    Assessment/Plan:    Postpartum Day #1 s/p , currently stable. Baby in room. By issue:    Preeclampsia, severe  Assessment & Plan  Patient met criteria in triage with mild range blood pressures q4h apart, then met criteria for pre-eclampsia with severe features due to persistent headache  S/p 12 hours of Mag gtt  Asymptomatic at this time  CBC/CMP wnl  UPC 0.16  Systolic (12hrs), Av , Min:122 , Max:150   Diastolic (12hrs), Av, Min:77, Max:105        AMA (advanced maternal age) primigravida 35+, third trimester  Assessment & Plan  NIPS is low risk    Migraine with aura and without status migrainosus, not intractable  Assessment & Plan  Currently on prednisone for migraine flare  Denies current headache or vision changes    *  (spontaneous vaginal delivery)  Assessment & Plan  Pt has received routine prenatal care   EFW: 27% at 32 weeks  Negative glucola       Subjective/Objective     Subjective:   No acute events overnight. Patient states she feels \"much better\" now that Mag is off; denies headache, changes in vision, chest pain, difficulty breathing. She describes mild dizziness when getting up to walk but this was much wors tevin the Mag and she has only been up once since the Mag came down. Pain: controlled. Tolerating PO: yes. Voiding: yes. Flatus: passing. Ambulating: yes. Chest pain: no. Shortness of breath: no. Leg pain: no. Lochia: within normal limits. Baby in room, feeding via breast.    Objective:   Vitals:   Temp:  [97.5 °F (36.4 °C)-98.7 °F (37.1 °C)] 98.2 °F (36.8 °C)  HR:  [0-111] 83  Resp:  [16-18] 18  BP: (108-155)/() 133/77       Intake/Output Summary (Last 24 hours) at 2024 0653  Last data filed at 2024 0158  Gross per 24 hour   Intake 1380.2 ml   Output 988 ml   Net 392.2 ml       Lab Results   Component Value Date    WBC 14.35 (H) 2024    B " 12.9 06/26/2024    HCT 39.0 06/26/2024    MCV 91 06/26/2024     06/26/2024       Physical Exam:   General: alert and oriented x3, in no apparent distress  Cardiovascular: regular rate  Pulmonary: normal effort  Abdomen: Soft, non-tender, non-distended, no rebound or guarding. Uterine fundus firm and non-tender, at the umbilicus  Extremities: Non tender with no edema    Amanda Tilley MD  PGY-I, OBGYN  6/29/2024, 6:53 AM

## 2024-06-29 NOTE — LACTATION NOTE
This note was copied from a baby's chart.  CONSULT - LACTATION  Baby Boy (Jaya Johnston 1 days male MRN: [id number]    [facility] Room / Bed: (N)/(N) Encounter: [id number]    Maternal Information     MOTHER:  Priya Johntson  Maternal Age: 38 y.o.  OB History: # 1 - Date: None, Sex: None, Weight: None, GA: None, Type: None, Apgar1: None, Apgar5: None, Living: None, Birth Comments: None    # 2 - Date: 24, Sex: Male, Weight: 2920 g (6 lb 7 oz), GA: 37w6d, Type: Vaginal, Spontaneous, Apgar1: 9, Apgar5: 9, Living: Living, Birth Comments: None   Previouse breast reduction surgery? No    Lactation history:   Has patient previously breast fed: No   How long had patient previously breast fed:     Previous breast feeding complications:       Past Surgical History:   Procedure Laterality Date    WISDOM TOOTH EXTRACTION N/A        Birth information:  YOB: 2022   Time of birth: 2:44 PM   Sex: male   Delivery type: Vaginal, Spontaneous   Birth Weight: 2920 g (6 lb 7 oz)   Percent of Weight Change: -2%     Gestational Age: 37w6d   [unfilled]    Assessment     Breast and nipple assessment:  normal assessment with long, ev nipple     Assessment: normal assessment    Feeding assessment: feeding well  LATCH:  Latch: Grasps breast, tongue down, lips flanged, rhythmic sucking   Audible Swallowing: A few with stimulation   Type of Nipple: Everted (After stimulation)   Comfort (Breast/Nipple): Soft/non-tender   Hold (Positioning): Partial assist, teach one side, mother does other, staff holds   LATCH Score: 8          Feeding recommendations:  breast feed on demand    Mare Whatley RN 2024 2:26 PM

## 2024-06-29 NOTE — PLAN OF CARE
Problem: PAIN - ADULT  Goal: Verbalizes/displays adequate comfort level or baseline comfort level  Description: Interventions:  - Encourage patient to monitor pain and request assistance  - Assess pain using appropriate pain scale  - Administer analgesics based on type and severity of pain and evaluate response  - Implement non-pharmacological measures as appropriate and evaluate response  - Consider cultural and social influences on pain and pain management  - Notify physician/advanced practitioner if interventions unsuccessful or patient reports new pain  Outcome: Progressing     Problem: INFECTION - ADULT  Goal: Absence or prevention of progression during hospitalization  Description: INTERVENTIONS:  - Assess and monitor for signs and symptoms of infection  - Monitor lab/diagnostic results  - Monitor all insertion sites, i.e. indwelling lines, tubes, and drains  - Monitor endotracheal if appropriate and nasal secretions for changes in amount and color  - [place] appropriate cooling/warming therapies per order  - Administer medications as ordered  - Instruct and encourage patient and family to use good hand hygiene technique  - Identify and instruct in appropriate isolation precautions for identified infection/condition  Outcome: Progressing  Goal: Absence of fever/infection during neutropenic period  Description: INTERVENTIONS:  - Monitor WBC    Outcome: Progressing     Problem: SAFETY ADULT  Goal: Patient will remain free of falls  Description: INTERVENTIONS:  - Educate patient/family on patient safety including physical limitations  - Instruct patient to call for assistance with activity   - Consult OT/PT to assist with strengthening/mobility   - Keep Call bell within reach  - Keep bed low and locked with side rails adjusted as appropriate  - Keep care items and personal belongings within reach  - Initiate and maintain comfort rounds  - Make Fall Risk Sign visible to staff  - Offer Toileting every  Hours,  in advance of need  - Initiate/Maintain alarm  - Obtain necessary fall risk management equipment:   - Apply yellow socks and bracelet for high fall risk patients  - Consider moving patient to room near nurses station  Outcome: Progressing  Goal: Maintain or return to baseline ADL function  Description: INTERVENTIONS:  -  Assess patient's ability to carry out ADLs; assess patient's baseline for ADL function and identify physical deficits which impact ability to perform ADLs (bathing, care of mouth/teeth, toileting, grooming, dressing, etc.)  - Assess/evaluate cause of self-care deficits   - Assess range of motion  - Assess patient's mobility; develop plan if impaired  - Assess patient's need for assistive devices and provide as appropriate  - Encourage maximum independence but intervene and supervise when necessary  - Involve family in performance of ADLs  - Assess for home care needs following discharge   - Consider OT consult to assist with ADL evaluation and planning for discharge  - Provide patient education as appropriate  Outcome: Progressing  Goal: Maintains/Returns to pre admission functional level  Description: INTERVENTIONS:  - Perform AM-PAC 6 Click Basic Mobility/ Daily Activity assessment daily.  - Set and communicate daily mobility goal to care team and patient/family/caregiver.   - Collaborate with rehabilitation services on mobility goals if consulted  - Perform Range of Motion  times a day.  - Reposition patient every  hours.  - Dangle patient  times a day  - Stand patient  times a day  - Ambulate patient  times a day  - Out of bed to chair  times a day   - Out of bed for meals times a day  - Out of bed for toileting  - Record patient progress and toleration of activity level   Outcome: Progressing     Problem: DISCHARGE PLANNING  Goal: Discharge to home or other facility with appropriate resources  Description: INTERVENTIONS:  - Identify barriers to discharge w/patient and caregiver  - Arrange for  needed discharge resources and transportation as appropriate  - Identify discharge learning needs (meds, wound care, etc.)  - Arrange for interpretive services to assist at discharge as needed  - Refer to Case Management Department for coordinating discharge planning if the patient needs post-hospital services based on physician/advanced practitioner order or complex needs related to functional status, cognitive ability, or social support system  Outcome: Progressing     Problem: POSTPARTUM  Goal: Experiences normal postpartum course  Description: INTERVENTIONS:  - Monitor maternal vital signs  - Assess uterine involution and lochia  Outcome: Progressing  Goal: Appropriate maternal -  bonding  Description: INTERVENTIONS:  - Identify family support  - Assess for appropriate maternal/infant bonding   -Encourage maternal/infant bonding opportunities  - Referral to  or  as needed  Outcome: Progressing  Goal: Establishment of infant feeding pattern  Description: INTERVENTIONS:  - Assess breast/bottle feeding  - Refer to lactation as needed  Outcome: Progressing  Goal: Incision(s), wounds(s) or drain site(s) healing without S/S of infection  Description: INTERVENTIONS  - Assess and document dressing, incision, wound bed, drain sites and surrounding tissue  - Provide patient and family education  - Perform skin care/dressing changes every   Outcome: Progressing

## 2024-06-29 NOTE — LACTATION NOTE
"This note was copied from a baby's chart.  Met with parents to discuss feeding plan. Mother is breastfeeding and reports that baby has been doing well, but has felt discomfort with latches.     Baby is currently at a 1.5% weight loss, having appropriate output and 24 hour bilirubin will be checked in the afternoon.     The Ready, Set, Baby Booklet was discussed. Discussed importance of skin to skin to help baby awaken for breastfeeding, to help with milk production as well as stabilize temperature, blood sugars, decrease pain, promote relaxation, and calm the baby as well as for bonding that father may do as well. Discussed tummy size progression as milk production increases to meet the nutritional/growing needs of the baby and risks associated with introducing early supplementation that is not medically indicated. Discussed alternative feeding methods as a manner to provide baby with additional colostrum/breast milk if baby is sleepy and/or unable to breastfeed directly to help protect the milk supply and preserve latching abilities at the breast. Mother was encouraged to hand express after feedings to provide \"dessert\" and when sleepy to hand express to provide \"snacks\" which could help baby to awaken for a feeding.    Discussed “Second Night Syndrome” explaining how baby’s cluster feeds to meet growing needs. Growth spurts periods were discussed within the first year and how cluster feeding helps boost milk supply.    Explained feeding cues and fullness cues as well as importance of obtaining a deep latch for effective milk removal and proper positioning (tummy to tummy, at level, nose to nipple, bring chin to breast first and bringing baby to breast) with ear, shoulder, and hip alignment.     Addressed breast pump needs and mother discussed that she has a double breast pump for home use.    Parents were made aware of how to communicate with lactation and encouraged to reach out for a latch assessment, continued " support and/or questions that arise.

## 2024-06-30 VITALS
HEART RATE: 76 BPM | HEIGHT: 69 IN | SYSTOLIC BLOOD PRESSURE: 126 MMHG | RESPIRATION RATE: 18 BRPM | TEMPERATURE: 98.4 F | DIASTOLIC BLOOD PRESSURE: 76 MMHG | WEIGHT: 210 LBS | OXYGEN SATURATION: 99 % | BODY MASS INDEX: 31.1 KG/M2

## 2024-06-30 PROCEDURE — 99024 POSTOP FOLLOW-UP VISIT: CPT | Performed by: OBSTETRICS & GYNECOLOGY

## 2024-06-30 RX ORDER — NIFEDIPINE 30 MG/1
30 TABLET, EXTENDED RELEASE ORAL DAILY
Qty: 60 TABLET | Refills: 0 | Status: SHIPPED | OUTPATIENT
Start: 2024-07-01

## 2024-06-30 RX ORDER — ACETAMINOPHEN 325 MG/1
650 TABLET ORAL EVERY 6 HOURS PRN
Start: 2024-06-30

## 2024-06-30 RX ORDER — DOCUSATE SODIUM 100 MG/1
100 CAPSULE, LIQUID FILLED ORAL 2 TIMES DAILY PRN
Start: 2024-06-30

## 2024-06-30 RX ORDER — IBUPROFEN 600 MG/1
600 TABLET ORAL EVERY 6 HOURS PRN
Start: 2024-06-30

## 2024-06-30 RX ADMIN — NIFEDIPINE 30 MG: 30 TABLET, EXTENDED RELEASE ORAL at 08:27

## 2024-06-30 RX ADMIN — IBUPROFEN 600 MG: 600 TABLET, FILM COATED ORAL at 04:16

## 2024-06-30 RX ADMIN — SERTRALINE HYDROCHLORIDE 50 MG: 50 TABLET ORAL at 08:27

## 2024-06-30 RX ADMIN — SENNOSIDES 8.6 MG: 8.6 TABLET, FILM COATED ORAL at 08:27

## 2024-06-30 RX ADMIN — ACETAMINOPHEN 650 MG: 325 TABLET, FILM COATED ORAL at 04:16

## 2024-06-30 RX ADMIN — DOCUSATE SODIUM 100 MG: 100 CAPSULE, LIQUID FILLED ORAL at 08:27

## 2024-06-30 NOTE — LACTATION NOTE
This note was copied from a baby's chart.  CONSULT - LACTATION  Baby Boy (Jaya Johnston 2 days male MRN: [id number]    [facility] NURSERY Room / Bed: (N)/(N) Encounter: [id number]    Maternal Information     MOTHER:  Priya Johnston  Maternal Age: 38 y.o.  OB History: # 1 - Date: None, Sex: None, Weight: None, GA: None, Type: None, Apgar1: None, Apgar5: None, Living: None, Birth Comments: None    # 2 - Date: 06/28/24, Sex: Male, Weight: 2920 g (6 lb 7 oz), GA: 37w6d, Type: Vaginal, Spontaneous, Apgar1: 9, Apgar5: 9, Living: Living, Birth Comments: None   Previouse breast reduction surgery? No    Lactation history:   Has patient previously breast fed: No   How long had patient previously breast fed:     Previous breast feeding complications:       Past Surgical History:   Procedure Laterality Date    WISDOM TOOTH EXTRACTION N/A 2007       Birth information:  YOB: 2022   Time of birth: 2:44 PM   Sex: male   Delivery type: Vaginal, Spontaneous   Birth Weight: 2920 g (6 lb 7 oz)   Percent of Weight Change: -7%     Gestational Age: 37w6d   [unfilled]    Assessment     Breast and nipple assessment: sore nipple     06/30/24 0921   Lactation Consultation   Reason for Consult 20 minutes   Maternal Information   Has mother  before? No   Breasts/Nipples   Left Breast Soft   Right Breast Soft   Left Nipple Sore;Everted   Right Nipple Sore;Everted   Breastfeeding Progress Not yet established   Other OB Lactation Tools   Feeding Devices Lanolin   Patient Follow-Up   Lactation Consult Status 2   Follow-Up Type Inpatient;Call as needed   Other OB Lactation Documentation    Additional Problem Noted Mom states baby cluster fed throughout the night. Mom working on a deep latch with baby. Education on proper position and alignment for deep latch. Reviewed cluster feedings and initial engorgement with mom. Encouraged to call for latch assessment.         Feeding recommendations:  breast feed on demand    Discussed 2nd night syndrome and ways to calm infant. Hand out given. Information on hand expression given. Discussed benefits of knowing how to manually express breast including stimulating milk supply, softening nipple for latch and evacuating breast in the event of engorgement.    C/O sore nipples.  Information given about sore nipples and how to correct with positioning techniques. Discussed maneuvers to latch infant on properly to avoid nipple pain and promote healing.  Discussed treatments that could be utilized to promote healing.    Provided education of deep latch to breast by placing the nipple to the nose, dragging down to chin to achieve a wide latch. Bring baby to the breast, not breast to baby. Move your shoulders down and away from your ears. Look for ear, shoulder, hip alignment. Baby's upper and lower lip should be flanged on the breast.    Encouraged parents to call for assistance, questions, and concerns about breastfeeding.  Extension provided.  Macie Diaz 6/30/2024 9:22 AM

## 2024-06-30 NOTE — PLAN OF CARE
Problem: PAIN - ADULT  Goal: Verbalizes/displays adequate comfort level or baseline comfort level  Description: Interventions:  - Encourage patient to monitor pain and request assistance  - Assess pain using appropriate pain scale  - Administer analgesics based on type and severity of pain and evaluate response  - Implement non-pharmacological measures as appropriate and evaluate response  - Consider cultural and social influences on pain and pain management  - Notify physician/advanced practitioner if interventions unsuccessful or patient reports new pain  Outcome: Adequate for Discharge     Problem: INFECTION - ADULT  Goal: Absence or prevention of progression during hospitalization  Description: INTERVENTIONS:  - Assess and monitor for signs and symptoms of infection  - Monitor lab/diagnostic results  - Monitor all insertion sites, i.e. indwelling lines, tubes, and drains  - Monitor endotracheal if appropriate and nasal secretions for changes in amount and color  - [place] appropriate cooling/warming therapies per order  - Administer medications as ordered  - Instruct and encourage patient and family to use good hand hygiene technique  - Identify and instruct in appropriate isolation precautions for identified infection/condition  Outcome: Adequate for Discharge  Goal: Absence of fever/infection during neutropenic period  Description: INTERVENTIONS:  - Monitor WBC    Outcome: Adequate for Discharge     Problem: SAFETY ADULT  Goal: Patient will remain free of falls  Description: INTERVENTIONS:  - Educate patient/family on patient safety including physical limitations  - Instruct patient to call for assistance with activity   - Consult OT/PT to assist with strengthening/mobility   - Keep Call bell within reach  - Keep bed low and locked with side rails adjusted as appropriate  - Keep care items and personal belongings within reach  - Initiate and maintain comfort rounds  - Make Fall Risk Sign visible to  staff  - Offer Toileting every  Hours, in advance of need  - Initiate/Maintain alarm  - Obtain necessary fall risk management equipment:   - Apply yellow socks and bracelet for high fall risk patients  - Consider moving patient to room near nurses station  Outcome: Adequate for Discharge  Goal: Maintain or return to baseline ADL function  Description: INTERVENTIONS:  -  Assess patient's ability to carry out ADLs; assess patient's baseline for ADL function and identify physical deficits which impact ability to perform ADLs (bathing, care of mouth/teeth, toileting, grooming, dressing, etc.)  - Assess/evaluate cause of self-care deficits   - Assess range of motion  - Assess patient's mobility; develop plan if impaired  - Assess patient's need for assistive devices and provide as appropriate  - Encourage maximum independence but intervene and supervise when necessary  - Involve family in performance of ADLs  - Assess for home care needs following discharge   - Consider OT consult to assist with ADL evaluation and planning for discharge  - Provide patient education as appropriate  Outcome: Adequate for Discharge  Goal: Maintains/Returns to pre admission functional level  Description: INTERVENTIONS:  - Perform AM-PAC 6 Click Basic Mobility/ Daily Activity assessment daily.  - Set and communicate daily mobility goal to care team and patient/family/caregiver.   - Collaborate with rehabilitation services on mobility goals if consulted  - Perform Range of Motion  times a day.  - Reposition patient every  hours.  - Dangle patient  times a day  - Stand patient  times a day  - Ambulate patient  times a day  - Out of bed to chair  times a day   - Out of bed for meals times a day  - Out of bed for toileting  - Record patient progress and toleration of activity level   Outcome: Adequate for Discharge     Problem: DISCHARGE PLANNING  Goal: Discharge to home or other facility with appropriate resources  Description: INTERVENTIONS:  -  Identify barriers to discharge w/patient and caregiver  - Arrange for needed discharge resources and transportation as appropriate  - Identify discharge learning needs (meds, wound care, etc.)  - Arrange for interpretive services to assist at discharge as needed  - Refer to Case Management Department for coordinating discharge planning if the patient needs post-hospital services based on physician/advanced practitioner order or complex needs related to functional status, cognitive ability, or social support system  Outcome: Adequate for Discharge     Problem: POSTPARTUM  Goal: Experiences normal postpartum course  Description: INTERVENTIONS:  - Monitor maternal vital signs  - Assess uterine involution and lochia  Outcome: Adequate for Discharge  Goal: Appropriate maternal -  bonding  Description: INTERVENTIONS:  - Identify family support  - Assess for appropriate maternal/infant bonding   -Encourage maternal/infant bonding opportunities  - Referral to  or  as needed  Outcome: Adequate for Discharge  Goal: Establishment of infant feeding pattern  Description: INTERVENTIONS:  - Assess breast/bottle feeding  - Refer to lactation as needed  Outcome: Adequate for Discharge  Goal: Incision(s), wounds(s) or drain site(s) healing without S/S of infection  Description: INTERVENTIONS  - Assess and document dressing, incision, wound bed, drain sites and surrounding tissue  - Provide patient and family education  - Perform skin care/dressing changes every   Outcome: Adequate for Discharge

## 2024-06-30 NOTE — PLAN OF CARE

## 2024-06-30 NOTE — PROGRESS NOTES
Obstetrics Progress Note  Priya Johnston 38 y.o. female MRN: [id number]  Unit/Bed#: -01 Encounter: [id number]    Assessment/Plan:    Postpartum Day #2 s/p , currently stable. Baby in room. By issue:  Preeclampsia, severe  Assessment & Plan  Patient met criteria in triage with mild range blood pressures q4h apart, then met criteria for pre-eclampsia with severe features due to persistent headache  S/p 12 hours of Mag gtt  Asymptomatic at this time  CBC/CMP wnl  UPC 0.16  Systolic (12hrs), Av , Min:122 , Max:150   Diastolic (12hrs), Av, Min:77, Max:105        AMA (advanced maternal age) primigravida 35+, third trimester  Assessment & Plan  NIPS is low risk    Migraine with aura and without status migrainosus, not intractable  Assessment & Plan  Currently on prednisone for migraine flare  Denies current headache or vision changes    *  (spontaneous vaginal delivery)  Assessment & Plan  Pt has received routine prenatal care   EFW: 27% at 32 weeks  Negative glucola         Subjective/Objective     Subjective:   No acute events overnight. Pain: controlled. Tolerating PO: yes. Voiding: yes. Flatus: passing. Ambulating: yes. Chest pain: no. Shortness of breath: no. Leg pain: no. Lochia: within normal limits. Baby in room, feeding via breast.    Objective:   Vitals:   Temp:  [97.5 °F (36.4 °C)-98.3 °F (36.8 °C)] 98.3 °F (36.8 °C)  HR:  [67-97] 67  Resp:  [16-18] 18  BP: (115-144)/() 133/84     No intake or output data in the 24 hours ending 24 0851    Lab Results   Component Value Date    WBC 14.35 (H) 2024    HGB 12.9 2024    HCT 39.0 2024    MCV 91 2024     2024       Physical Exam:   General: alert and oriented x3, in no apparent distress  Cardiovascular: regular rate  Pulmonary: normal effort  Abdomen: Soft, non-tender, non-distended, no rebound or guarding. Uterine fundus firm and non-tender, at the umbilicus  Extremities: Non tender with minimal  edema    Amanda Tilley MD  PGY-I, OBN  6/30/2024, 8:51 AM

## 2024-07-01 ENCOUNTER — TRANSITIONAL CARE MANAGEMENT (OUTPATIENT)
Dept: FAMILY MEDICINE CLINIC | Facility: CLINIC | Age: 39
End: 2024-07-01

## 2024-07-01 NOTE — UTILIZATION REVIEW
Continued Stay Review    Date: 06-27-24                          Current Patient Class: inpatient   Current Level of Care: medical     HPI:38 y.o. female initially admitted on 06-27-24 for  IOL G 2 P 0 @ 37 4/7 d/t elevated blood pressure     Assessment/Plan:   Date: 06-27-24   Day 2: will start IOL  @0940  Franks balloon inserted   IV MGSO 4 infusioning  Contraction Frequency (minutes): 2-5.5  Contraction Intensity: Mild  Uterine Activity Characteristics: Irregular  Cervical Dilation: 1  Cervical Effacement: 50  Fetal Station: Ballotable  Baseline Rate (FHR): 155 bpm  Fetal Heart Rate (FHT): 150 BPM    @ 2044  Franks balloon remains in place  IV Pitocin stated   Dose (lc-units/min) Oxytocin: 4 lc-units/min  Contraction Frequency (minutes): 2.5-3.5  Contraction Intensity: Mild  Uterine Activity Characteristics: Irregular  Cervical Dilation: 1-2  Cervical Effacement: 30  Fetal Station: -3  Baseline Rate (FHR): 145 bpm  Fetal Heart Rate (FHT): 155 BPM  FHR Category: I    @ 2307  Dose (lc-units/min) Oxytocin: 6 lc-units/min  Contraction Frequency (minutes): 1.5-4  Contraction Intensity: Mild  Uterine Activity Characteristics: Irregular  Cervical Dilation: 4-5  Cervical Effacement: 50  Fetal Station: -2  Baseline Rate (FHR): 145 bpm  Fetal Heart Rate (FHT): 138 BPM  FHR Category: I    0-28-24  @0108  Dose (lc-units/min) Oxytocin: 8 lc-units/min  Contraction Frequency (minutes): 1.5-4  Contraction Intensity: Mild  Uterine Activity Characteristics: Irregular  Cervical Dilation: 4-5  Cervical Effacement: 50  Fetal Station: -2  Baseline Rate (FHR): 140 bpm  Fetal Heart Rate (FHT): 146 BPM  FHR Category: 2  FHT with spontaneous 3m prolonged deceleration. Resolved with maternal repositioning and pitocin held      @ 0630  S/p epidural     @ 0800  Dose (lc-units/min) Oxytocin: 16 lc-units/min  Contraction Frequency (minutes): 2-4  Contraction Intensity: Mild/Moderate  Uterine Activity Characteristics:  Regular  Cervical Dilation: 5-6  Cervical Effacement: 90  Fetal Station: -1  Baseline Rate (FHR): 140 bpm  Fetal Heart Rate (FHT): 140 BPM  FHR Category: 1    @1339  Dose (lc-units/min) Oxytocin: 16 lc-units/min  Contraction Frequency (minutes): 2-5  Contraction Intensity: Moderate  Uterine Activity Characteristics: Irregular  Cervical Dilation: 10  Dilation Complete Date: 24  Dilation Complete Time: 1337  Cervical Effacement: 100  Fetal Station: 1  Baseline Rate (FHR): 140 bpm  Fetal Heart Rate (FHT): 150 BPM  FHR Category: 1     @ 37 6/7 WKS  MALE @ 1444  APGAR  9  9  WT  2920 GRAMS   Infant dried suctioned stimulated and warmed. Responded well and taken to NBN       Vital Signs (last 3 days) before discharge       Date/Time Temp Pulse Resp BP SpO2 O2 Device Cardiac (WDL) Patient Position - Orthostatic VS Pain    24 1000 -- -- -- -- -- None (Room air) WDL -- --    24 0951 -- -- -- -- -- -- -- -- No Pain    24 0852 98.4 °F (36.9 °C) 76 18 126/76 -- -- -- -- No Pain    24 0416 -- -- -- -- -- -- -- -- 5    24 0411 98.3 °F (36.8 °C) 67 18 133/84 99 % None (Room air) -- Lying 5    24 0012 98.3 °F (36.8 °C) 71 18 115/67 99 % None (Room air) -- Lying No Pain    24 97.9 °F (36.6 °C) 74 16 120/75 99 % None (Room air) -- Sitting No Pain    24 -- -- -- -- -- None (Room air) WDL -- No Pain    24 1614 97.6 °F (36.4 °C) 97 16 137/87 100 % None (Room air) -- Sitting No Pain    24 1219 97.5 °F (36.4 °C) 81 18 144/102 98 % None (Room air) -- Sitting No Pain    24 0900 -- -- -- -- -- None (Room air) WDL -- --    24 0846 97.8 °F (36.6 °C) 100 16 143/94 98 % None (Room air) -- Sitting No Pain    24 0420 98.2 °F (36.8 °C) 83 18 133/77 98 % -- -- Lying No Pain    24 0158 97.5 °F (36.4 °C) 83 -- 122/77 98 % None (Room air) -- Lying No Pain    24 0008 -- -- -- -- -- -- -- -- 2    24 000 97.9 °F (36.6 °C) 90 -- 136/81  98 % None (Room air) -- Lying --    06/28/24 2200 97.8 °F (36.6 °C) 94 17 150/91 96 % None (Room air) -- Sitting 1    06/28/24 2000 98.2 °F (36.8 °C) 101 17 145/105 97 % None (Room air) WDL Sitting 3    06/28/24 1914 -- -- -- -- -- -- -- -- 2    06/28/24 1835 98.5 °F (36.9 °C) 99 18 143/100 99 % None (Room air) -- Sitting 2    06/28/24 1800 -- 68 18 127/72 99 % -- -- -- --    06/28/24 1733 -- 89 -- -- -- -- -- -- --    06/28/24 1730 -- 81 -- 147/93 -- -- -- -- --    06/28/24 1715 -- -- -- 146/92 -- -- -- -- --    06/28/24 1700 -- -- -- 142/91 -- -- -- -- --    06/28/24 1645 -- -- -- 140/88 -- -- -- -- --    06/28/24 1630 -- -- -- 138/86 -- -- -- -- --    06/28/24 1615 -- -- -- 137/86 -- -- -- -- --    06/28/24 1600 97.7 °F (36.5 °C) -- 16 -- -- -- -- -- --    06/28/24 1557 -- 74 -- 140/86 -- -- -- -- --    06/28/24 1543 -- 83 -- 147/87 -- -- -- -- --    06/28/24 1527 -- 86 -- 120/67 -- -- -- -- --    06/28/24 1512 -- 86 -- 142/79 -- -- -- -- --    06/28/24 1500 98.3 °F (36.8 °C) -- 16 136/76 97 % None (Room air) -- -- 5 06/28/24 1457 -- 81 -- 136/76 -- -- -- -- --    06/28/24 1428 -- 93 -- 145/92 -- -- -- -- --    06/28/24 1413 -- 85 -- 136/86 -- -- -- -- --    06/28/24 1358 -- 107 -- 155/98 -- -- -- -- --    06/28/24 1343 -- 111 -- 154/98 -- -- -- -- --    06/28/24 1329 -- 75 -- 108/60 -- -- -- -- --    06/28/24 1315 -- 76 -- 116/63 -- -- -- -- --    06/28/24 1300 98.3 °F (36.8 °C) -- 16 -- -- -- -- -- --    06/28/24 1258 -- 76 -- 110/64 -- -- -- -- --    06/28/24 1242 -- 83 -- 127/69 -- -- -- -- --    06/28/24 1227 -- 75 -- 139/76 -- -- -- -- --    06/28/24 1216 -- 86 -- -- -- -- -- -- --    06/28/24 1215 -- 86 -- 129/74 -- -- -- -- --    06/28/24 1213 -- 80 -- 129/74 -- -- -- -- --    06/28/24 1200 98.3 °F (36.8 °C) 89 16 140/80 96 % None (Room air) -- -- --    06/28/24 1159 -- 89 -- 140/80 -- -- -- -- --    06/28/24 1147 -- -- -- -- -- -- -- -- 8    06/28/24 1143 -- 93 -- 131/86 -- -- -- -- --    06/28/24  1127 -- 78 -- 126/65 -- -- -- -- --    06/28/24 1115 -- -- -- 135/66 -- -- -- -- --    06/28/24 1112 -- 88 -- -- -- -- -- -- --    06/28/24 1100 -- -- -- 137/65 -- -- -- -- --    06/28/24 1057 -- 77 -- -- -- -- -- -- --    06/28/24 1047 -- -- -- 138/63 -- -- -- -- --    06/28/24 1045 -- 82 -- 138/63 -- -- -- -- --    06/28/24 1030 -- 91 -- 131/87 -- -- -- -- --    06/28/24 1015 -- 89 -- 124/67 -- -- -- -- --    06/28/24 1000 97.7 °F (36.5 °C) 86 16 128/74 -- None (Room air) -- -- --    06/28/24 0943 -- 84 -- 125/74 -- -- -- -- --    06/28/24 0929 -- 78 -- 128/71 -- -- -- -- --    06/28/24 0912 -- 77 -- 139/79 -- -- -- -- --    06/28/24 0900 98.3 °F (36.8 °C) -- 18 -- -- -- -- -- --    06/28/24 0842 -- 81 -- 109/73 -- -- -- -- --    06/28/24 0833 -- 0 -- -- -- -- -- -- --    06/28/24 0829 -- 102 -- -- -- -- -- -- --    06/28/24 0828 -- 86 -- 131/72 -- -- -- -- --    06/28/24 0825 -- 88 -- -- -- -- -- -- --    06/28/24 0821 -- 83 -- -- -- -- -- -- --    06/28/24 0817 -- 72 -- -- -- -- -- -- --    06/28/24 0813 -- 74 -- -- -- -- -- -- --    06/28/24 0811 -- 75 -- 118/56 -- -- -- -- --    06/28/24 0809 -- 77 -- -- -- -- -- -- --    06/28/24 0808 -- 89 -- 126/62 -- -- -- -- --    06/28/24 0805 -- 72 -- 126/58 -- -- -- -- --    06/28/24 0802 -- 89 -- 129/63 -- -- -- -- --    06/28/24 0800 98.7 °F (37.1 °C) 68 16 132/64 -- None (Room air) WDL -- 7    06/28/24 0759 -- 77 -- -- -- -- -- -- --    06/28/24 0757 -- 81 -- -- -- -- -- -- --    06/28/24 0756 -- 86 -- 115/62 -- -- -- -- --    06/28/24 0753 -- 90 -- 146/75 -- -- -- -- --    06/28/24 0751 -- 85 -- 143/65 -- -- -- -- --    06/28/24 0749 -- 82 -- -- -- -- -- -- --    06/28/24 0747 -- 93 -- 119/69 -- -- -- -- --    06/28/24 0745 -- 98 -- -- -- -- -- -- --    06/28/24 0744 -- 110 -- 126/79 -- -- -- -- --    06/28/24 0741 -- 110 -- 129/81 -- -- -- -- --    06/28/24 0737 -- 81 -- -- -- -- -- -- --    06/28/24 0735 -- 84 -- 140/76 -- -- -- -- --    06/28/24 0733 -- 81 -- --  -- -- -- -- --    06/28/24 0732 -- 77 -- 144/75 -- -- -- -- --    06/28/24 0729 -- 71 -- 131/76 -- -- -- -- --    06/28/24 0726 -- 65 -- 136/79 -- -- -- -- --    06/28/24 0725 -- 87 -- -- -- -- -- -- --    06/28/24 0723 -- 71 -- 138/78 -- -- -- -- --    06/28/24 0721 -- 73 -- -- -- -- -- -- --    06/28/24 0720 -- 69 -- 140/79 -- -- -- -- --    06/28/24 0717 -- 69 -- 136/77 -- -- -- -- --    06/28/24 0714 -- 67 -- 136/76 -- -- -- -- --    06/28/24 0713 -- 87 -- -- -- -- -- -- --    06/28/24 0711 -- 76 -- 132/73 -- -- -- -- --    06/28/24 0709 -- 77 -- -- -- -- -- -- --    06/28/24 0708 -- 78 -- 143/77 -- -- -- -- --    06/28/24 0705 -- 85 -- 154/99 -- -- -- -- --    06/28/24 0702 -- 77 -- 150/92 -- -- -- -- --    06/28/24 0701 -- 76 -- -- -- -- -- -- --    06/28/24 0700 -- 80 -- 141/81 -- -- -- -- --    06/28/24 0657 -- 79 -- -- -- -- -- -- --    06/28/24 0656 -- 80 -- 131/61 -- -- -- -- --    06/28/24 0653 -- 86 -- 135/74 -- -- -- -- --    06/28/24 0651 -- 85 -- 133/82 -- -- -- -- --    06/28/24 0649 -- 83 -- -- -- -- -- -- --    06/28/24 0647 -- 89 -- 141/77 -- -- -- -- --    06/28/24 0645 -- 75 -- -- -- -- -- -- --    06/28/24 0644 -- 74 -- 136/74 -- -- -- -- --    06/28/24 0641 -- 76 -- 138/72 -- -- -- -- --    06/28/24 0639 -- -- -- -- -- -- -- -- 9    06/28/24 0638 -- 78 -- 140/76 -- -- -- -- --    06/28/24 0637 -- 87 -- -- -- -- -- -- --    06/28/24 0633 -- 77 -- -- 100 % -- -- -- --    06/28/24 0632 -- 89 -- 111/74 -- -- -- -- --    06/28/24 0629 -- 88 -- 126/66 -- -- -- -- --    06/28/24 0628 -- 81 -- -- 99 % -- -- -- --    06/28/24 0626 -- 73 -- 141/78 -- -- -- -- --    06/28/24 0625 -- 84 -- -- -- -- -- -- --    06/28/24 0623 -- 66 -- -- -- -- -- -- --    06/28/24 0621 -- 82 -- -- -- -- -- -- --    06/28/24 0620 -- 73 -- 139/81 -- -- -- -- --    06/28/24 0618 -- 99 -- -- 100 % -- -- -- --    06/28/24 0617 -- 76 -- 121/79 -- -- -- -- --    06/28/24 0613 -- 80 -- -- -- -- -- -- --    06/28/24 0612 -- 80 --  -- 100 % -- -- -- --    06/28/24 0609 -- 104 -- -- -- -- -- -- --    06/28/24 0607 -- 89 -- -- 100 % -- -- -- --    06/28/24 0605 -- 109 -- -- -- -- -- -- --    06/28/24 0602 -- 89 -- -- 100 % -- -- -- --    06/28/24 0601 -- 114 -- -- -- -- -- -- --    06/28/24 0557 -- 108 -- -- -- -- -- -- --    06/28/24 0553 97.5 °F (36.4 °C) 107 -- -- -- -- -- -- 9    06/28/24 0549 -- 84 -- -- -- -- -- -- --    06/28/24 0545 -- 71 -- 129/76 -- -- -- -- --    06/28/24 0541 -- 65 -- -- -- -- -- -- --    06/28/24 0537 -- 71 -- -- -- -- -- -- --    06/28/24 0533 -- 83 -- -- -- -- -- -- --    06/28/24 0532 -- -- -- -- -- -- -- -- 6    06/28/24 0521 -- 81 -- -- -- -- -- -- --    06/28/24 0517 -- 114 -- -- -- -- -- -- --    06/28/24 0513 -- 117 -- -- -- -- -- -- --    06/28/24 0509 -- 76 -- -- -- -- -- -- --    06/28/24 0505 -- 75 -- -- -- -- -- -- --    06/28/24 0501 -- 70 -- -- -- -- -- -- --    06/28/24 0500 97.8 °F (36.6 °C) -- 18 -- -- -- -- -- --    06/28/24 0457 -- 76 -- -- -- -- -- -- --    06/28/24 0453 -- 74 -- -- -- -- -- -- --    06/28/24 0449 -- 70 -- -- -- -- -- -- --    06/28/24 0445 -- 74 -- 130/84 -- -- -- -- --    06/28/24 0441 -- 65 -- -- -- -- -- -- --    06/28/24 0437 -- 70 -- -- -- -- -- -- --    06/28/24 0433 -- 67 -- -- -- -- -- -- --    06/28/24 0431 -- 69 -- 141/82 -- -- -- -- --    06/28/24 0429 -- 78 -- -- -- -- -- -- --    06/28/24 0425 -- 68 -- -- -- -- -- -- --    06/28/24 0421 -- 92 -- -- -- -- -- -- --    06/28/24 0417 -- 60 -- -- -- -- -- -- --    06/28/24 0416 -- 65 -- 143/81 -- -- -- -- --    06/28/24 0413 -- 63 -- -- -- -- -- -- --    06/28/24 0409 -- 71 -- -- -- -- -- -- --    06/28/24 0405 -- 71 -- -- -- -- -- -- --    06/28/24 0403 -- -- -- -- -- -- -- -- 7    06/28/24 0401 -- 69 -- -- -- -- -- -- --    06/28/24 0400 97.7 °F (36.5 °C) 69 18 137/86 98 % None (Room air) -- Lying --    06/28/24 0345 -- 87 -- 136/82 -- -- -- -- --    06/28/24 0330 -- 66 -- -- -- -- -- -- --    06/28/24 0317 -- 71 --  -- -- -- -- -- --    06/28/24 0315 -- 67 -- 113/55 -- -- -- -- --    06/28/24 0313 -- 73 -- -- -- -- -- -- --    06/28/24 0309 -- 69 -- -- -- -- -- -- --    06/28/24 0305 -- 82 -- -- -- -- -- -- --    06/28/24 0301 -- 82 -- -- -- -- -- -- --    06/28/24 0300 -- 72 -- 112/57 -- -- -- -- --    06/28/24 0257 -- 95 -- -- -- -- -- -- --    06/28/24 0253 -- 108 -- -- -- -- -- -- --    06/28/24 0249 -- 105 -- -- -- -- -- -- --    06/28/24 0247 -- 84 -- 131/76 -- -- -- -- --    06/28/24 0245 -- 85 -- -- -- -- -- -- --    06/28/24 0241 -- 91 -- -- -- -- -- -- --    06/28/24 0237 -- 86 -- -- -- -- -- -- --    06/28/24 0233 -- 79 -- -- -- -- -- -- --    06/28/24 0231 -- 71 -- 128/69 -- -- -- -- --    06/28/24 0229 -- 76 -- -- -- -- -- -- --    06/28/24 0225 -- 87 -- -- -- -- -- -- --    06/28/24 0221 -- 77 -- -- -- -- -- -- --    06/28/24 0219 -- 79 18 -- 97 % None (Room air) -- Lying --    06/28/24 0217 -- 76 -- -- -- -- -- -- --    06/28/24 0216 -- 81 -- 132/73 -- -- -- -- --    06/28/24 0213 -- 82 -- -- -- -- -- -- --    06/28/24 0209 -- 85 -- -- -- -- -- -- --    06/28/24 0205 -- 78 -- -- -- -- -- -- --    06/28/24 0201 -- 101 -- -- -- -- -- -- --    06/28/24 0200 -- 88 -- 119/65 -- -- -- -- --    06/28/24 0157 -- 80 -- -- -- -- -- -- --    06/28/24 0153 -- 82 -- -- -- -- -- -- --    06/28/24 0149 -- 75 -- -- -- -- -- -- --    06/28/24 0145 -- 96 -- 116/69 -- -- -- -- --    06/28/24 0141 -- 77 -- -- -- -- -- -- --    06/28/24 0137 -- 69 -- -- -- -- -- -- --    06/28/24 0133 -- 76 -- -- -- -- -- -- --    06/28/24 0130 -- 69 -- 117/61 -- -- -- -- --    06/28/24 0129 -- 72 -- -- -- -- -- -- --    06/28/24 0125 -- 78 -- -- -- -- -- -- --    06/28/24 0121 -- 77 -- -- -- -- -- -- --    06/28/24 0117 -- 64 -- -- -- -- -- -- --    06/28/24 0115 -- 70 -- 115/61 -- -- -- -- --    06/28/24 0113 -- 67 -- -- -- -- -- -- --    06/28/24 0109 -- 77 -- -- -- -- -- -- --    06/28/24 0105 -- 107 -- -- -- -- -- -- --    06/28/24 0101 -- 102  -- -- -- -- -- -- --    06/28/24 0053 -- 66 -- -- -- -- -- -- --    06/28/24 0049 -- 72 -- -- -- -- -- -- --    06/28/24 0045 -- 93 -- -- -- -- -- -- --    06/28/24 0041 -- 79 -- -- -- -- -- -- --    06/28/24 0035 97.6 °F (36.4 °C) 99 18 116/57 97 % -- -- Lying No Pain    06/28/24 0033 -- 81 -- -- -- -- -- -- --    06/28/24 0030 -- 91 -- -- -- -- -- -- --    06/28/24 0029 -- 82 -- -- -- -- -- -- --    06/28/24 0025 -- 72 -- -- -- -- -- -- --    06/28/24 0021 -- 87 -- -- -- -- -- -- --    06/28/24 0017 -- 72 -- -- -- -- -- -- --    06/28/24 0015 -- 88 -- 107/57 -- -- -- -- --    06/28/24 0013 -- 73 -- -- -- -- -- -- --    06/28/24 0009 -- 72 -- -- -- -- -- -- --    06/28/24 0005 -- 70 -- -- -- -- -- -- --    06/28/24 0000 -- 65 -- 105/52 -- None (Room air) -- -- --    06/27/24 2357 -- 69 -- -- -- -- -- -- --    06/27/24 2353 -- 75 -- -- -- -- -- -- --    06/27/24 2349 -- 73 -- -- -- -- -- -- --    06/27/24 2345 -- 62 -- 114/57 -- -- -- -- --    06/27/24 2341 -- 61 -- -- -- -- -- -- --    06/27/24 2337 -- 72 -- -- -- -- -- -- --    06/27/24 2333 -- 62 -- -- -- -- -- -- --    06/27/24 2330 -- 68 -- 117/58 -- -- -- -- --    06/27/24 2329 -- 74 -- -- -- -- -- -- --    06/27/24 2325 -- 102 -- -- -- -- -- -- --    06/27/24 2321 -- 75 -- -- -- -- -- -- --    06/27/24 2317 -- 68 -- -- -- -- -- -- --    06/27/24 2315 -- 60 -- 123/56 -- -- -- -- 5 06/27/24 2306 -- 75 -- 124/56 -- -- -- -- --    06/27/24 2300 -- 65 -- 124/56 -- -- -- -- --    06/27/24 2245 -- 86 -- 135/74 -- -- -- -- --    06/27/24 2230 -- 74 -- 123/68 -- -- -- -- --    06/27/24 2216 -- 65 -- 126/70 -- -- -- -- --    06/27/24 2200 -- 74 -- 122/62 -- -- -- -- --    06/27/24 2145 98.2 °F (36.8 °C) 62 18 118/59 -- None (Room air) -- Lying --    06/27/24 2130 -- 66 -- 150/74 -- -- -- -- --    06/27/24 2115 -- 70 -- 127/79 -- -- -- -- 5 06/27/24 2100 -- 74 -- 145/79 -- -- -- -- --    06/27/24 2045 -- 68 -- 137/75 -- -- -- -- --    06/27/24 2031 -- 67 -- 142/69  -- -- -- -- --    06/27/24 2015 -- 68 -- 142/76 -- -- -- -- --    06/27/24 2001 -- 63 -- 161/76 -- -- -- -- --    06/27/24 1945 -- 84 -- 131/82 -- -- -- -- --    06/27/24 1930 -- 71 -- 136/72 -- -- -- -- --    06/27/24 1915 -- 111 -- 137/89 -- -- -- -- --    06/27/24 1846 -- 85 -- 143/84 -- -- -- -- --    06/27/24 1838 -- -- -- -- -- -- -- -- 1    06/27/24 1827 -- 71 -- 138/83 -- -- -- -- --    06/27/24 1733 97.6 °F (36.4 °C) 81 18 149/86 98 % -- -- Lying --    06/27/24 1256 97.5 °F (36.4 °C) 75 18 134/74 -- -- -- Sitting 2    06/27/24 1010 -- -- -- -- -- -- -- -- 8    06/27/24 0844 97.9 °F (36.6 °C) 95 18 141/84 -- -- -- Lying 8    06/27/24 0529 98.3 °F (36.8 °C) 64 18 139/75 98 % -- -- Lying No Pain    06/27/24 0131 -- -- -- -- -- -- -- -- --    OBSERV: pt allowed to rest at this time, next NST at 0800 per Dr. Cárdenas's note. Q4VS for GHTN at 06/27/24 0131 06/27/24 0100 -- 74 -- 140/79 -- -- -- -- --          Weight (last 2 days) before discharge       None              Pertinent Labs/Diagnostic Results:   Radiology:  No orders to display     Cardiology:  No orders to display     GI:  No orders to display           Results from last 7 days   Lab Units 06/26/24  1441   WBC [place]ousand/uL 14.35*   HEMOGLOBIN g/dL 12.9   HEMATOCRIT % 39.0   PLATELETS Thousands/uL 221         Results from last 7 days   Lab Units 06/26/24  1441   SODIUM mmol/L 135   POTASSIUM mmol/L 3.5   CHLORIDE mmol/L 103   CO2 mmol/L 25   ANION GAP mmol/L 7   BUN mg/dL 9   CREATININE mg/dL 0.86   EGFR ml/min/1.73sq m 87   CALCIUM mg/dL 10.7*     Results from last 7 days   Lab Units 06/26/24  1441   AST U/L 20   ALT U/L 13   ALK PHOS U/L 230*   TOTAL PROTEIN g/dL 6.3*   ALBUMIN g/dL 3.7   TOTAL BILIRUBIN mg/dL 0.31         Results from last 7 days   Lab Units 06/26/24  1441   GLUCOSE RANDOM mg/dL 91       Results from last 7 days   Lab Units 06/26/24  1441   CREATININE UR mg/dL 49.5   PROTEIN UR mg/dL 8   PROT/CREAT RATIO UR  0.16*       Medications:    Scheduled Medications:  No current facility-administered medications for this encounter.    Continuous IV Infusions:  No current facility-administered medications for this encounter.    PRN Meds:  No current facility-administered medications for this encounter.      Discharge Plan: home with baby after delivery     Network Utilization Review Department  ATTENTION: Please call with any questions or concerns to [phone] and carefully listen to the prompts so that you are directed to the right person. All voicemails are confidential.   For Discharge needs, contact Care Management DC Support Team at [phone] opt. [phone]  Send all requests for admission clinical reviews, approved or denied determinations and any other requests to dedicated fax number below belonging to the campus where the patient is receiving treatment. List of dedicated fax numbers for the Facilities:  FACILITY NAME UR FAX NUMBER   ADMISSION DENIALS (Administrative/Medical Necessity) [phone]   DISCHARGE SUPPORT TEAM (NETWORK) [phone]   PARENT CHILD HEALTH (Maternity/NICU/Pediatrics) [phone]   [facility] [phone]   [facility] [phone]   [facility] [phone]   [facility] [phone]   [facility] [phone]   [facility] [phone]   [facility] [phone]   [facility] [phone]   [facility] [phone]   [facility] [phone]   [facility] [phone]   [facility] [phone]

## 2024-07-02 ENCOUNTER — PATIENT MESSAGE (OUTPATIENT)
Dept: OBGYN CLINIC | Facility: CLINIC | Age: 39
End: 2024-07-02

## 2024-07-03 ENCOUNTER — CLINICAL SUPPORT (OUTPATIENT)
Dept: OBGYN CLINIC | Facility: CLINIC | Age: 39
End: 2024-07-03

## 2024-07-03 VITALS
DIASTOLIC BLOOD PRESSURE: 80 MMHG | SYSTOLIC BLOOD PRESSURE: 132 MMHG | BODY MASS INDEX: 28.14 KG/M2 | WEIGHT: 190 LBS | HEIGHT: 69 IN

## 2024-07-03 DIAGNOSIS — Z01.30 BP CHECK: Primary | ICD-10-CM

## 2024-07-03 PROCEDURE — NURSE

## 2024-07-03 NOTE — PROGRESS NOTES
Pt is here for her bp check. Bp was 132/80. Pt is on Procardia 30mg daily. Pt denies any blurred vision or headaches. Per  pt is to follow up at her post partum appointment and continue taking her procardia.  
yes

## 2024-07-04 LAB — PLACENTA IN STORAGE: NORMAL

## 2024-07-05 ENCOUNTER — TELEPHONE (OUTPATIENT)
Age: 39
End: 2024-07-05

## 2024-07-05 NOTE — PATIENT COMMUNICATION
Placed call to the patient to review a post partum assessment. Left a voicemail for her to return my call.

## 2024-07-05 NOTE — TELEPHONE ENCOUNTER
Pt returned call to OB navigator, Jovana Martinez. Teams message sent to attempt warm transfer, however, unavailable at the moment. Please provide a call back at your earliest convenience. Thank you.

## 2024-07-08 NOTE — PATIENT COMMUNICATION
Placed call to the patient~    POSTPARTUM PHONE CALL ASSESSMENT    Date of Delivery: 2024  Delivering Provider:   Mode:   Delivery Notes/Complications: none   Do you still have bleeding/pain? If so, how much/how severe? Minimal bleeding / no pain   Regular BMs/Urination? Yes / yes  Breastfeeding/Formula/Both? Breastfeeding  How are you doing emotionally? Good- everyday is getting a little bit better.   EPDS Score: 2  Do you have any other questions or concerns for us or your provider? None at this time  Have you scheduled the pediatrician appointment with pediatrician? yes  Do you have a postpartum visit scheduled? yes   Date scheduled: 2024 Provider:     Patient is agreeable to contact the office with any concerns.

## 2024-07-26 ENCOUNTER — POSTPARTUM VISIT (OUTPATIENT)
Dept: OBGYN CLINIC | Facility: CLINIC | Age: 39
End: 2024-07-26

## 2024-07-26 VITALS
BODY MASS INDEX: 26.69 KG/M2 | WEIGHT: 180.2 LBS | DIASTOLIC BLOOD PRESSURE: 70 MMHG | HEIGHT: 69 IN | SYSTOLIC BLOOD PRESSURE: 112 MMHG

## 2024-07-26 DIAGNOSIS — O14.13 SEVERE PRE-ECLAMPSIA IN THIRD TRIMESTER: ICD-10-CM

## 2024-07-26 DIAGNOSIS — Z30.011 ENCOUNTER FOR INITIAL PRESCRIPTION OF CONTRACEPTIVE PILLS: ICD-10-CM

## 2024-07-26 PROBLEM — O99.342 ANXIETY DURING PREGNANCY, ANTEPARTUM, SECOND TRIMESTER: Status: RESOLVED | Noted: 2024-02-26 | Resolved: 2024-07-26

## 2024-07-26 PROBLEM — O14.10 PREECLAMPSIA, SEVERE: Status: RESOLVED | Noted: 2024-06-26 | Resolved: 2024-07-26

## 2024-07-26 PROBLEM — O21.9 NAUSEA/VOMITING IN PREGNANCY: Status: RESOLVED | Noted: 2023-12-07 | Resolved: 2024-07-26

## 2024-07-26 PROBLEM — O09.513 AMA (ADVANCED MATERNAL AGE) PRIMIGRAVIDA 35+, THIRD TRIMESTER: Status: RESOLVED | Noted: 2024-03-07 | Resolved: 2024-07-26

## 2024-07-26 PROBLEM — F41.9 ANXIETY DURING PREGNANCY, ANTEPARTUM, SECOND TRIMESTER: Status: RESOLVED | Noted: 2024-02-26 | Resolved: 2024-07-26

## 2024-07-26 PROBLEM — Z34.90 ENCOUNTER FOR INDUCTION OF LABOR: Status: RESOLVED | Noted: 2024-06-26 | Resolved: 2024-07-26

## 2024-07-26 PROBLEM — O34.511: Status: RESOLVED | Noted: 2024-01-08 | Resolved: 2024-07-26

## 2024-07-26 PROCEDURE — 99024 POSTOP FOLLOW-UP VISIT: CPT | Performed by: OBSTETRICS & GYNECOLOGY

## 2024-07-26 RX ORDER — ACETAMINOPHEN AND CODEINE PHOSPHATE 120; 12 MG/5ML; MG/5ML
1 SOLUTION ORAL DAILY
Qty: 84 TABLET | Refills: 1 | Status: SHIPPED | OUTPATIENT
Start: 2024-07-26

## 2024-07-26 RX ORDER — MAGNESIUM 30 MG
30 TABLET ORAL 2 TIMES DAILY
COMMUNITY

## 2024-07-26 NOTE — PROGRESS NOTES
Assessment & Plan     Normal postpartum exam.     1. Contraception: oral progesterone-only contraceptive  2. Annual exam due in October.   3. Increase activity as tolerated, may resume all normal activity.  4. Lactation consult needed: no; if yes, [facility] information discussed.   5. Preeclampsia - currently on Procardia XL 30mg daily.    - BP normotensive today at 112/70. Will discontinue procardia medication and monitor BP daily for the next week and send BP log through [facility] for review.   6. Pelvic floor PT -- discussed getting back to running program. Was established with PT during pregnancy. Will reach out if referral is needed. Discussed holding off on running at this time until pelvic floor is more recovered.         Subjective   Chief Complaint   Patient presents with    Postpartum Care       Priya Johnston is a 38 y.o.  female who presents for a postpartum visit.     Delivery Method: Vaginal, Spontaneous   Delivery Date and Time: 2024 2:44 PM  Delivery Attending: Cesia Gray  Gestational Age at delivery: 37w6d   Route of Delivery: Vaginal, Spontaneous  Reason for  delivery:        Admitting Diagnoses:   Patient Active Problem List   Diagnosis    Migraine with aura and without status migrainosus, not intractable    Nausea/vomiting in pregnancy    Incarcerated gravid uterus in first trimester    Anxiety during pregnancy, antepartum, second trimester    AMA (advanced maternal age) primigravida 35+, third trimester     (spontaneous vaginal delivery)    Preeclampsia, severe    Encounter for induction of labor       Gestational Diabetes: no  Pregnancy Complications: pre-clampsia    Anesthesia: Epidural,     Delivery: Vaginal, Spontaneous at 2024 2:44 PM  Laceration: Perineal: 1° Repaired?      Baby's Name: Ho Johnston   Baby's Weight: 2920 g (6 lb 7 oz); 103    Apgar scores: 9  and 9  at 1 and 5 minutes, respectively  Breast or formula:  Breastfeeding & pumping & formula   Pediatrician: [facility]     Bleeding no bleeding. Bowel function: normal. Bladder function: normal. The patient is not having any pain.     Patient has not been sexually active. Desired contraception method is oral progesterone-only contraceptive.     Postpartum depression screening: negative. EPDS : 2. She denies depression/anxiety/trouble sleeping.     Last Pap : 01/10/2021 ; no abnormalities      The following portions of the patient's history were reviewed and updated as appropriate: allergies, current medications, past family history, past medical history, past social history, past surgical history, and problem list.      Current Outpatient Medications:     magnesium 30 MG tablet, Take 30 mg by mouth 2 (two) times a day, Disp: , Rfl:     NIFEdipine (PROCARDIA XL) 30 mg 24 hr tablet, Take 1 tablet (30 mg total) by mouth daily, Disp: 60 tablet, Rfl: 0    norethindrone (Ortho Micronor) 0.35 MG tablet, Take 1 tablet (0.35 mg total) by mouth daily, Disp: 84 tablet, Rfl: 1    Prenatal Vit-Fe Fumarate-FA (PRENATAL PO), Take by mouth, Disp: , Rfl:     Riboflavin (VITAMIN B-2 PO), Take by mouth, Disp: , Rfl:     sertraline (ZOLOFT) 50 mg tablet, TAKE 1 tab po daily; AVOID REGULAR USE OF NSAIDS WHILE TAKING; note - pt states she is taking 50 mg daily, Disp: 90 tablet, Rfl: 1    No Known Allergies    Review of Systems  Review of Systems   Constitutional:  Negative for chills, diaphoresis and fever.   Eyes:  Negative for visual disturbance.   Respiratory:  Negative for shortness of breath.    Cardiovascular:  Negative for chest pain.   Gastrointestinal:  Negative for abdominal pain, constipation, diarrhea, nausea and vomiting.   Genitourinary:  Negative for difficulty urinating, pelvic pain, vaginal bleeding, vaginal discharge and vaginal pain.   Musculoskeletal:  Negative for back pain.   Neurological:  Negative for dizziness, weakness and headaches.         Objective      BP  "112/70 (BP Location: Left arm, Patient Position: Sitting, Cuff Size: Standard)   Ht 5' 9\" (1.753 m)   Wt 81.7 kg (180 lb 3.2 oz)   LMP 10/07/2023   Breastfeeding Yes Comment: & pumping & formula  BMI 26.61 kg/m²     Physical Exam  Constitutional:       General: She is not in acute distress.     Appearance: Normal appearance. She is normal weight. She is not diaphoretic.   HENT:      Head: Normocephalic and atraumatic.   Pulmonary:      Effort: Pulmonary effort is normal. No respiratory distress.   Musculoskeletal:      Right lower leg: No edema.      Left lower leg: No edema.   Neurological:      Mental Status: She is alert and oriented to person, place, and time.   Skin:     General: Skin is warm and dry.      Coloration: Skin is not pale.   Psychiatric:         Mood and Affect: Mood normal.         Behavior: Behavior normal.         Thought Content: Thought content normal.         Judgment: Judgment normal.   Vitals and nursing note reviewed.         Incision: n/a     "

## 2024-08-05 ENCOUNTER — TELEPHONE (OUTPATIENT)
Dept: OBGYN CLINIC | Facility: CLINIC | Age: 39
End: 2024-08-05

## 2024-08-05 ENCOUNTER — PATIENT MESSAGE (OUTPATIENT)
Dept: OBGYN CLINIC | Facility: CLINIC | Age: 39
End: 2024-08-05

## 2024-08-05 DIAGNOSIS — M62.89 PELVIC FLOOR DYSFUNCTION: Primary | ICD-10-CM

## 2024-08-05 NOTE — TELEPHONE ENCOUNTER
----- Message from Kay CRAIG sent at 8/5/2024  2:47 PM EDT -----  Regarding: Insurance Referral Request  Patient is in need of insurance referral for PT  Rendering NPI: [place]- [id number]  Ordering Physician: Cesia Gray MD   Diagnosis: Post partum   Date of visit: 8/7/2024    Rendering Office Phone Number:[phone]  Rendering Office Fax Number:[phone]  ##Please fax to office when complete##

## 2024-08-07 ENCOUNTER — EVALUATION (OUTPATIENT)
Dept: PHYSICAL THERAPY | Facility: REHABILITATION | Age: 39
End: 2024-08-07
Payer: COMMERCIAL

## 2024-08-07 PROCEDURE — 97161 PT EVAL LOW COMPLEX 20 MIN: CPT

## 2024-08-07 PROCEDURE — 97110 THERAPEUTIC EXERCISES: CPT

## 2024-08-07 NOTE — PROGRESS NOTES
PT Evaluation     Today's Date: 2024  Patient name: Priya Johnston  : 1985  MRN: [id number]  Referring provider: Leigh Estrella, *  Dx:  Encounter Diagnosis     ICD-10-CM    1. Postpartum care following vaginal delivery  Z39.2           Assessment  Impairments: abnormal coordination, abnormal muscle firing, abnormal muscle tone and impaired physical strength  Symptom irritability: low    Assessment details:    Priya Johnston is a 38 y.o. year old female presenting to outpatient pelvic health physical therapy for postpartum care following vaginal delivery. Patient is  at 5 weeks 5 days post partum. Patient's chief complaint is intermittent low back pain when lifting son, and patient wants to progress appropriately with exercise for return to run.  Internal Assessment held today to provide further tissue healing time. External Examination reveals proximal hip weakness, reduced hinge mechanics with lifting, and reduced thoracic ROM. S/S consistent with postpartum care following vaginal delivery, secondary to compensatory patterns developed during pregnancy to compensate for altered COM.  Patient will benefit from skilled PT services to address these deficits and improve function. Based on patients age and motivation, patient is a positive candidate to respond favorably to physical therapy with a good prognosis.  Patient was educated on examination and techniques, plan of care, goals of therapy, and HEP. Patient was provided an opportunity to ask any questions. Provided Pt initial HEP. Patient demonstrated and verbalized understanding. Verbally reported to hold exercises if increased pain or discomfort.  Pt will be seen 1-2x/week for 6-12 weeks. Patient will be re-assessed regularly in order to document progress and limit any regression. The goal of PT is to progress patient towards independence of self care management.     Understanding of Dx/Px/POC: good     Prognosis: good    Goals  1. In  4 weeks, patient will improve side-lying hip abduction to 5/5 to assist with SLS and reduced falls risk  2. In 4 weeks, patient will lift #20 lb off from the ground and display appropriate hip mechanics to limit future back pain  3. In 4 weeks, patient will display appropriate feeding mechanics to limit onset of neck and shoulder pain   4. In 6 weeks, patient will display appropriate carrying of child both in and out of the car seat  5. In 6 weeks, patient will display appropriate TrA engagement in sitting, quadraped, and standing for appropriate transitional movements.   6. In 8 weeks, patient will double limb jump x 30 seconds without leakage and single limb jump x 15 seconds without leakage or pelvic pain for appropriate return to run.   7. In 8 weeks, patient will tolerate 2 miles of running without pain or symptoms.   8. In 8 weeks, patient will run with >160 bpm scott to limit injury onset.        Plan  Patient would benefit from: skilled physical therapy  Planned modality interventions: biofeedback    Planned therapy interventions: manual therapy, massage, balance, motor coordination training, neuromuscular re-education, breathing training, postural training, therapeutic activities, therapeutic exercise, therapeutic training and functional ROM exercises    Frequency: 1x week  Duration in weeks: 12  Plan of Care beginning date: 2024  Plan of Care expiration date: 2024  Treatment plan discussed with: patient    Subjective      Chief Complaint: Intermittent low back pain when lifting son. Patient wants to return to run postpartum.   HPI: Pt presents to PT  post vaginal delivery on 2024. Patient denies any pelvic pain at this time, but does report intermittent pain with lifting son of lower lumbar area. Patient is walking at this time, but wishes to return back to running and back to previous level of activity.   Occupation: Patient is off from work currently on maternity leave. Works at  [employer].   Social History:  and [employer].   Preferred language/communication style:  English.  PMH: Reviewed.        Specific symptom history:    Urinary:     Onset: Patient denies any bladder symptoms.   Symptoms Present:     Patient denies  Denies:           Urinary Frequency, Stress Incontinence, and Incomplete emptying  Daytime void interval: Every 2 hours  Uses of liners/pads/diapers? Patient denies  Frequency of leakage: 8x per day  Fluid intake:     Water [employer].   Coffee 1 cup a day              Bowel:     Defecates 1x/day or every other day .  Constipation/Straining?: Patient denies  Use of Squatty Potty?: No     Nutrition:   Patient is eating when she can.      GYN:      with vaginal delivery with (forceps, vacuum, no assistance) and (episiotomies/perineal lacerations).   Sees GYN regularly? Recent follow up at 4 week. Patient is following up again in October.  Time spent actively pushing: Less than one hour  Menstruation: Has not yet returned.    Sexual Function:     Currently sexually active? Not currently  Number of partners:   History of sexual trauma/abuse? No   MSK:      History of abdominal surgery? Patient denies  Current exercise: Patient is currently walking but wishes to return back to running and spinning classes, Body pump classes.                        Systems Review History:  Cardiovascular/pulmonary  Pre-eclampsia history during pregnancy. Patient was placed on BP medication due to continued elevated. Patient reports she has now discontinued the medication last week.    Integumentary    Musculoskeletal Patient denies any MSK pelvic pain at this time.    Neuromuscular System Any recent falls? No  Numbness/tingling? No     Surgical history and/or abdominal surgeries:  None        Goal: Return to run without pain         Objective     Active Range of Motion     Lumbar   Flexion:  Restriction level: minimal  Extension:  Restriction level: minimal  Left lateral flexion:   "Restriction level: minimal  Right lateral flexion:  Restriction level: minimal  Left rotation:  Restriction level: minimal  Right rotation:  Restriction level: minimal    Joint Play   Joints within functional limits: L1, L2, L3, L4 and L5     Hypomobile: T2, T3, T4, T5, T6, T7 and T8     Strength/Myotome Testing     Left Hip   Planes of Motion   Extension: 4-  Abduction: 3+    Isolated Muscles   Gluteus memo: 4-    Right Hip   Planes of Motion   Extension: 4  Abduction: 4-    Isolated Muscles   Gluteus maximums: 4    Functional Assessment        Comments  Hinge Mechanics: Noted thoracolumbar hinge - educated on appropriate hinge mechanics.       Abdominal Assessment:      Abdominal Assessment: Breathing Mechanics:  Patient displayed appropriate diaphragmatic breathing    Abdominal Assessment:   Neg. TTP along upper and lower quadrants.   Curl up: 100% clearance  Cough: No doming    Diastatis   3\" above umbilicus (# fingers): 0  Umbilicus (# fingers): 0  3\" below umbilicus (# fingers): 0       Graphical documentation:                  Precautions: Standard  Patient Active Problem List   Diagnosis   • Migraine with aura and without status migrainosus, not intractable       Chief Complaint    Patient Subjective includes:  Intermittent low back pain with lifting son.   Goal is to return to run   Patient Goals        Objective Impairments to address   Pelvic Floor Strength Will assess at 6 weeks.    Power    Endurance     Fast Twitch     % relaxation    External Hip Strength        Lumbar Screen          PLAN OF CARE EXPIRATION: 10/07/2024    AUTHORIZATION EXPIRATION:     Date Authorization     Number of visits provided            Date of Service 08/07/2024        Visits Used 1        Visits Remaining         [facility] Created yes                 Neuro Re-Ed         Urge deferral         Defecation mechanics         Breathing Mechanics         PFM Coordination         PFM Down Training         Internal Cueing        "            Ther Ex         PFM Strengthening         Thoracic ROM Rolling ball up wall  1 x 8 with 8 second hold         Hip strengthening Prone hip extension   1 x 10 w/ 2'' hold    Sidelying hip abduction   1 x 12 B/L         Functional Strengthening         Abdominal Strengthening         Dilator Training         Aerobic         Therapeutic Rest Breaks         Mobility          High Impact         UE Strengthening          Core progression  Hooklying TrA + minor curl up   1 x 10         Ther Activity         Voiding Diaries         Review of sxs         Fluid Intake                  Manual Ther         PFM exam         Ortho exam         Dynamometer Testing         Fascial Decompression                  Modalities                           Outcome Measure

## 2024-08-15 ENCOUNTER — OFFICE VISIT (OUTPATIENT)
Dept: PHYSICAL THERAPY | Facility: REHABILITATION | Age: 39
End: 2024-08-15
Payer: COMMERCIAL

## 2024-08-15 PROCEDURE — 97110 THERAPEUTIC EXERCISES: CPT

## 2024-08-15 NOTE — PROGRESS NOTES
Daily Note     Today's date: 8/15/2024  Patient name: Priya Johnston  : 1985  MRN: [id number]  Referring provider: Leigh Estrella, *  Dx:   Encounter Diagnosis     ICD-10-CM    1. Postpartum care following vaginal delivery  Z39.2            1:1 60 minute session.           Subjective: Patient reports continued positive progression with HEP.       Objective: See treatment diary below      Assessment: Tolerated treatment well. Introduced more progress core strengthening and stabilization today. Patient tolerated well. Introduced light single limb therex to begin progression toward return to run with appropriate hip strengthening. Noted continued reduced hinge mechanics with forward lifting of son. Progressed further appropriate hinge mechanics. Further progressed appropriate thoracic ROM - patient noted positive progress. Introduced more progress PFM strengthening utilizing both gravity assist and against. Verbal cueing required for appropriate squat mechanics - noting cue for increased core engagement and posterior chain activation.   Patient would benefit from continued PT to further appropriately progress appropriate return to run,  and lifting mechanics.       Plan: Continue per plan of care.      Precautions: Standard  Patient Active Problem List   Diagnosis    Migraine with aura and without status migrainosus, not intractable       Chief Complaint    Patient Subjective includes:  Intermittent low back pain with lifting son.   Goal is to return to run   Patient Goals        Objective Impairments to address   Pelvic Floor Strength Will assess at 6 weeks.    Power    Endurance     Fast Twitch     % relaxation    External Hip Strength        Lumbar Screen          PLAN OF CARE EXPIRATION: 10/07/2024    AUTHORIZATION EXPIRATION:     Date Authorization     Number of visits provided            Date of Service 2024 8/15/2024       Visits Used 1 2       Visits Remaining         [facility] Created  yes                 Neuro Re-Ed         Urge deferral         Defecation mechanics         Breathing Mechanics         PFM Coordination         PFM Down Training         Internal Cueing                   Ther Ex         PFM Strengthening  Hooklying ball squeeze with PFM engagement  5'' x 12     Seated on physioball with PFM concentric engagement  1 x 10              Thoracic ROM Rolling ball up wall  1 x 8 with 8 second hold  Rolling ball up wall   1 x 8 with 8 second hold    Seated Thoracic extension in chair  1 x 10        Hip strengthening Prone hip extension   1 x 10 w/ 2'' hold    Sidelying hip abduction   1 x 12 B/L  Sidelying hip abduction with weight #1 lb   2 x 10     Single leg bridge  1 x 10 B/L     Clamshell + IR + extension   1 x 12 B/L YTB        Functional Strengthening  Squats with #15 lb weight  2 x 10 verbal cueing for glute engagement.     Hinge mechanics   1 x 15 at wall    Hinge  with weight  1 x 15 at wall. #15 lb       Abdominal Strengthening         Dilator Training         Aerobic         Therapeutic Rest Breaks         Mobility          High Impact         UE Strengthening          Core progression  Hooklying TrA + minor curl up   1 x 10  Hooklying TrA + march  1 x 12     Hooklying TrA + lift  1 x 12     Hooklying BKFO  1 x 15 B/L with YTB       Ther Activity         Voiding Diaries         Review of sxs         Fluid Intake                  Manual Ther         PFM exam         Ortho exam         Dynamometer Testing         Fascial Decompression                  Modalities                           Outcome Measure

## 2024-08-22 ENCOUNTER — APPOINTMENT (OUTPATIENT)
Dept: PHYSICAL THERAPY | Facility: REHABILITATION | Age: 39
End: 2024-08-22
Payer: COMMERCIAL

## 2024-08-29 ENCOUNTER — OFFICE VISIT (OUTPATIENT)
Dept: PHYSICAL THERAPY | Facility: REHABILITATION | Age: 39
End: 2024-08-29
Payer: COMMERCIAL

## 2024-08-29 PROCEDURE — 97110 THERAPEUTIC EXERCISES: CPT

## 2024-08-29 NOTE — PROGRESS NOTES
Daily Note     Today's date: 2024  Patient name: Priya Johnston  : 1985  MRN: [id number]  Referring provider: Leigh Estrella, *  Dx:   Encounter Diagnosis     ICD-10-CM    1. Postpartum care following vaginal delivery  Z39.2                      Subjective: Patient reports feeling better from last week post headache. Patient denies any pain or discomfort at this time.       Objective: See treatment diary below      Assessment: Tolerated treatment well. Further introduced appropriate single limb therex to assist with return to run. Introduced PFM concentric activation today with use of gravity assist. Patient denies any pain, and so internal assessment will be performed per patient consent at around 10 weeks to further confirm appropriate strength for return to run to limit symptom onset. Noted with return to run - incorporation of single leg hip flexion with TB verbal cueing to limit drive with lumbar extension and reduced hip flexion. Patient would benefit from continued PT to further progress return to run and PFM coordination and concentric engagement to limit further symptom with running onset.       Plan: Continue per plan of care. PROGRESS RETURN TO RUN - single leg exercises and PFM concentric engagement.      Precautions: Standard  Patient Active Problem List   Diagnosis    Migraine with aura and without status migrainosus, not intractable       Chief Complaint    Patient Subjective includes:  Intermittent low back pain with lifting son.   Goal is to return to run   Patient Goals        Objective Impairments to address   Pelvic Floor Strength Will assess at 6 weeks.    Power    Endurance     Fast Twitch     % relaxation    External Hip Strength        Lumbar Screen          PLAN OF CARE EXPIRATION: 10/07/2024    AUTHORIZATION EXPIRATION:     Date Authorization     Number of visits provided            Date of Service 2024 8/15/2024 2024      Visits Used 1 2 3      Visits  Remaining         [facility] Created yes                 Neuro Re-Ed         Urge deferral         Defecation mechanics         Breathing Mechanics         PFM Coordination         PFM Down Training         Internal Cueing                   Ther Ex         PFM Strengthening  Hooklying ball squeeze with PFM engagement  5'' x 12     Seated on physioball with PFM concentric engagement  1 x 10        Bridge with PFM concentric engagement  1 x 12 and TB above knees for hip abduction      Thoracic ROM Rolling ball up wall  1 x 8 with 8 second hold  Rolling ball up wall   1 x 8 with 8 second hold    Seated Thoracic extension in chair  1 x 10        Hip strengthening Prone hip extension   1 x 10 w/ 2'' hold    Sidelying hip abduction   1 x 12 B/L  Sidelying hip abduction with weight #1 lb   2 x 10     Single leg bridge  1 x 10 B/L     Clamshell + IR + extension   1 x 12 B/L YTB  Single leg bridge  2 x 10 B/L       Functional Strengthening  Squats with #15 lb weight  2 x 10 verbal cueing for glute engagement.     Hinge mechanics   1 x 15 at wall    Hinge  with weight  1 x 15 at wall. #15 lb Squats with #15 lb weight  2 x 10 with 6 inch step     Step up with follow through into reverse lunge 8 inch  1 x 10 B/L Contact guard.           Abdominal Strengthening   Hooklying TrA +lift  1 x 10     Hooklying 90-90 heel taps  1 x 10    Hooklying 90-90 dying bug hip extension   1 x 10 B/L    Seated on physioball marching  1 x 15 B/L       Return to Run    Standing single leg HR  1 x 25 B/L     Single leg running man with red TB focus on reciprocal arms  2 x 10 B/L     SLS on foam shoes off balance  4 x 30 seconds B/L    Wall Squat  4 x 15 seconds            Dilator Training         Aerobic         Therapeutic Rest Breaks         Mobility          High Impact         UE Strengthening          Core progression  Hooklying TrA + minor curl up   1 x 10  Hooklying TrA + march  1 x 12     Hooklying TrA + lift  1 x 12     Hooklying  BKFO  1 x 15 B/L with YTB       Ther Activity         Voiding Diaries         Review of sxs         Fluid Intake                  Manual Ther         PFM exam         Ortho exam         Dynamometer Testing         Fascial Decompression                  Modalities                           Outcome Measure

## 2024-09-05 ENCOUNTER — OFFICE VISIT (OUTPATIENT)
Dept: FAMILY MEDICINE CLINIC | Facility: CLINIC | Age: 39
End: 2024-09-05
Payer: COMMERCIAL

## 2024-09-05 VITALS
DIASTOLIC BLOOD PRESSURE: 78 MMHG | SYSTOLIC BLOOD PRESSURE: 114 MMHG | HEART RATE: 82 BPM | BODY MASS INDEX: 28.17 KG/M2 | WEIGHT: 190.2 LBS | OXYGEN SATURATION: 98 % | HEIGHT: 69 IN

## 2024-09-05 DIAGNOSIS — Z00.00 PHYSICAL EXAM: Primary | ICD-10-CM

## 2024-09-05 DIAGNOSIS — L70.9 ACNE, UNSPECIFIED ACNE TYPE: ICD-10-CM

## 2024-09-05 DIAGNOSIS — E78.49 FAMILIAL HYPERLIPIDEMIA: ICD-10-CM

## 2024-09-05 DIAGNOSIS — B35.1 ONYCHOMYCOSIS: ICD-10-CM

## 2024-09-05 DIAGNOSIS — F41.8 DEPRESSION WITH ANXIETY: ICD-10-CM

## 2024-09-05 DIAGNOSIS — Z87.59 HISTORY OF PRE-ECLAMPSIA: ICD-10-CM

## 2024-09-05 PROCEDURE — 99214 OFFICE O/P EST MOD 30 MIN: CPT | Performed by: FAMILY MEDICINE

## 2024-09-05 PROCEDURE — 99395 PREV VISIT EST AGE 18-39: CPT | Performed by: FAMILY MEDICINE

## 2024-09-05 PROCEDURE — 3725F SCREEN DEPRESSION PERFORMED: CPT | Performed by: FAMILY MEDICINE

## 2024-09-05 RX ORDER — SERTRALINE HYDROCHLORIDE 25 MG/1
TABLET, FILM COATED ORAL
Qty: 90 TABLET | Refills: 1 | Status: SHIPPED | OUTPATIENT
Start: 2024-09-05

## 2024-09-05 RX ORDER — ERYTHROMYCIN AND BENZOYL PEROXIDE 30; 50 MG/G; MG/G
GEL TOPICAL
Qty: 46.6 G | Refills: 1 | Status: SHIPPED | OUTPATIENT
Start: 2024-09-05

## 2024-09-05 NOTE — PROGRESS NOTES
FAMILY PRACTICE OFFICE VISIT    NAME: Priya Johnston    AGE: 38 y.o.   SEX: female  : 1985   MRN: [id number]    DATE: 2024  TIME: 10:41 AM    Assessment and Plan   1. Depression with anxiety  MURRAY - 7 score of 4      2. History of pre-eclampsia  BP normal  No longer on procardia.      3. Physical exam  Anticipatory guidance and preventative medicine discussed  Adacel during pregnancy  Recommend flu shot   Pt has a     Finishing up with pelvic floor rehab  And waiting to start up with running.  F/u with derm for full body skin exam    Refer to derm for onychomycosis right toenial.    Return in 1 year    4.  Familial HL    Repeat ordered  If still high - refer to cardiology   Family h/o same    5.  Acne  Involving neck and upper back, shoulders  Trial of benzamycin  Has tried plain benzoyl peroxide over the counter without great relief.  F/u with derm then as well.        There are no Patient Instructions on file for this visit.    Patient Instructions   Go to pharmacy for boosters of the following:  MMR   And  Varicella   Titers were low for rubella and varicella at early pregnancy    Also get flu shot       Schedule with derm and podiatry        Chief Complaint   No chief complaint on file.      History of Present Illness   Priya Johnston is a 38 y.o.-year-old female who presents today for routine PE  Is 2.5 mos post-partum  Had f/u with gyn  Going for pelvic floor rehab  In prep for running.  (Return to running program)      Review of Systems   Review of Systems   Respiratory:  Negative for cough, shortness of breath and wheezing.    Cardiovascular:  Negative for chest pain and palpitations.   Gastrointestinal:  Negative for abdominal pain, blood in stool, constipation and diarrhea.   Genitourinary:  Negative for dysuria and hematuria.        Post-partum X 2 mos  Just stopped breast feeding  Has not resumed menses as of yet  Up to date with gyn exams.    No urinary incontinence      Neurological:         Migraines have returned post-partum - and seem stronger  Taking tylenol  And mag and riboflavin  Usually naprosyn worked for pt in past.     Psychiatric/Behavioral:          Pt would like to try to wean off the zoloft by taking 25 mg daily  Is returning to work in 2 weeks - so does not want to completely stop as of yet.    In laws will watch baby thru end of year  And then  in 2025         Active Problem List     Patient Active Problem List   Diagnosis   • Migraine with aura and without status migrainosus, not intractable         Past Medical History:  Past Medical History:   Diagnosis Date   • Cluster headache 2009   • Headache, tension-type 2005   • Migraine 2009   • Preeclampsia, severe 2024   •  (spontaneous vaginal delivery) 2024   • Varicella     disease       Past Surgical History:  Past Surgical History:   Procedure Laterality Date   • WISDOM TOOTH EXTRACTION N/A        Family History:  Family History   Problem Relation Age of Onset   • Osteoporosis Mother    • Migraines Mother    • Asthma Mother    • Diabetes Mother    • Cancer Father         prostate   • Diabetes Father    • Dementia Maternal Grandmother    • Migraines Maternal Grandmother    • No Known Problems Half-Brother    • No Known Problems Half-Sister        Social History:  Social History     Socioeconomic History   • Marital status: /Civil Union     Spouse name: Not on file   • Number of children: Not on file   • Years of education: Not on file   • Highest education level: Not on file   Occupational History   • Not on file   Tobacco Use   • Smoking status: Never   • Smokeless tobacco: Never   Vaping Use   • Vaping status: Never Used   Substance and Sexual Activity   • Alcohol use: Not Currently     Alcohol/week: 2.0 standard drinks of alcohol     Types: 2 Cans of beer per week     Comment: socially   • Drug use: Not Currently     Comment: nothing within the past 2  years (THC in past)   • Sexual activity: Yes     Partners: Male   Other Topics Concern   • Not on file   Social History Narrative   • Not on file     Social Determinants of Health     Financial Resource Strain: Not on file   Food Insecurity: No Food Insecurity (5/6/2024)    Hunger Vital Sign    • Worried About Running Out of Food in the Last Year: Never true    • Ran Out of Food in the Last Year: Never true   Transportation Needs: No Transportation Needs (5/6/2024)    PRAPARE - Transportation    • Lack of Transportation (Medical): No    • Lack of Transportation (Non-Medical): No   Physical Activity: Not on file   Stress: Not on file   Social Connections: Not on file   Intimate Partner Violence: Not on file   Housing Stability: Low Risk  (5/6/2024)    Housing Stability Vital Sign    • Unable to Pay for Housing in the Last Year: No    • Number of Times Moved in the Last Year: 1    • Homeless in the Last Year: No       Objective     Vitals:    09/05/24 1038   BP: 114/78   Pulse: 82   SpO2: 98%     Wt Readings from Last 3 Encounters:   09/05/24 86.3 kg (190 lb 3.2 oz)   07/26/24 81.7 kg (180 lb 3.2 oz)   07/03/24 86.2 kg (190 lb)       Physical Exam  Musculoskeletal:         General: No tenderness.      Right lower leg: No edema.      Left lower leg: No edema.   Skin:     General: Skin is warm.      Coloration: Skin is not jaundiced.      Comments: Right 1st toenail consistent with onychomycosis.     Neurological:      General: No focal deficit present.      Mental Status: She is oriented to person, place, and time.   Psychiatric:         Mood and Affect: Mood normal.         Behavior: Behavior normal.         Thought Content: Thought content normal.         Judgment: Judgment normal.       Pertinent Laboratory/Diagnostic Studies:  Lab Results   Component Value Date    BUN 9 06/26/2024    CREATININE 0.86 06/26/2024    CALCIUM 10.7 (H) 06/26/2024    K 3.5 06/26/2024    CO2 25 06/26/2024     06/26/2024     Lab  "Results   Component Value Date    ALT 13 06/26/2024    AST 20 06/26/2024    ALKPHOS 230 (H) 06/26/2024       Lab Results   Component Value Date    WBC 14.35 (H) 06/26/2024    HGB 12.9 06/26/2024    HCT 39.0 06/26/2024    MCV 91 06/26/2024     06/26/2024       No results found for: \"TSH\"    No results found for: \"CHOL\"  Lab Results   Component Value Date    TRIG 198 (H) 05/03/2023     Lab Results   Component Value Date    HDL 38 (L) 05/03/2023     Lab Results   Component Value Date    LDLCALC 192 (H) 05/03/2023     No results found for: \"HGBA1C\"    Results for orders placed or performed during the hospital encounter of 06/26/24   CBC and Platelet   Result Value Ref Range    WBC 14.35 (H) 4.31 - 10.16 Thousand/uL    RBC 4.28 3.81 - 5.12 Million/uL    Hemoglobin 12.9 11.5 - 15.4 g/dL    Hematocrit 39.0 34.8 - 46.1 %    MCV 91 82 - 98 fL    MCH 30.1 26.8 - 34.3 pg    MCHC 33.1 31.4 - 37.4 g/dL    RDW 14.4 11.6 - 15.1 %    Platelets 221 149 - 390 Thousands/uL    MPV 10.5 8.9 - 12.7 fL   Comprehensive metabolic panel   Result Value Ref Range    Sodium 135 135 - 147 mmol/L    Potassium 3.5 3.5 - 5.3 mmol/L    Chloride 103 96 - 108 mmol/L    CO2 25 21 - 32 mmol/L    ANION GAP 7 4 - 13 mmol/L    BUN 9 5 - 25 mg/dL    Creatinine 0.86 0.60 - 1.30 mg/dL    Glucose 91 65 - 140 mg/dL    Calcium 10.7 (H) 8.4 - 10.2 mg/dL    AST 20 13 - 39 U/L    ALT 13 7 - 52 U/L    Alkaline Phosphatase 230 (H) 34 - 104 U/L    Total Protein 6.3 (L) 6.4 - 8.4 g/dL    Albumin 3.7 3.5 - 5.0 g/dL    Total Bilirubin 0.31 0.20 - 1.00 mg/dL    eGFR 87 ml/min/1.73sq m   Protein / creatinine ratio, urine   Result Value Ref Range    Creatinine, Ur 49.5 Reference range not established. mg/dL    Protein Urine Random 8 Reference range not established. mg/dL    Prot/Creat Ratio, Ur 0.16 (H) 0.00 - 0.10   RPR-Syphilis Screening (Total Syphilis IGG/IGM)   Result Value Ref Range    Syphilis Total Antibody Non-reactive Non-Reactive   CORD, Blood gas, venous "   Result Value Ref Range    pH, Cord Rashid 7.319 7.190 - 7.490    pCO2, Cord Rashid 38.4 27.0 - 43.0 mm HG    pO2, Cord Rashid 27.0 15.0 - 45.0 mm HG    HCO3, Cord Rashid 19.3 12.2 - 28.6 mmol/L    Base Exc, Cord Rashid -6.2 (L) 1.0 - 9.0 mmol/L    O2 Cont, Cord Rashid 12.6 mL/dL    O2 HGB,VENOUS CORD 60.9 %   CORD, Blood gas, arterial   Result Value Ref Range    pH, Cord Art 7.253 7.230 - 7.430    pCO2, Cord Art 52.0 30.0 - 60.0    pO2, Cord Art 22.1 5.0 - 25.0 mm HG    HCO3, Cord Art 22.4 17.3 - 27.3 mmol/L    Base Exc, Cord Art -5.2 (L) 3.0 - 11.0 mmol/L    O2 Content, Cord Art 8.5 ml/dl    O2 Hgb, Arterial Cord 41.4 %   Placenta in Storage   Result Value Ref Range    PLACENTA IN STORAGE Placenta Discarded    Type and screen   Result Value Ref Range    ABO Grouping O     Rh Factor Positive     Antibody Screen Negative     Specimen Expiration Date [id number]        No orders of the defined types were placed in this encounter.      ALLERGIES:  No Known Allergies    Current Medications     Current Outpatient Medications   Medication Sig Dispense Refill   • magnesium 30 MG tablet Take 30 mg by mouth 2 (two) times a day     • norethindrone (Ortho Micronor) 0.35 MG tablet Take 1 tablet (0.35 mg total) by mouth daily 84 tablet 1   • Prenatal Vit-Fe Fumarate-FA (PRENATAL PO) Take by mouth     • Riboflavin (VITAMIN B-2 PO) Take by mouth     • sertraline (ZOLOFT) 50 mg tablet TAKE 1 tab po daily; AVOID REGULAR USE OF NSAIDS WHILE TAKING; note - pt states she is taking 50 mg daily 90 tablet 1   • NIFEdipine (PROCARDIA XL) 30 mg 24 hr tablet Take 1 tablet (30 mg total) by mouth daily 60 tablet 0     No current facility-administered medications for this visit.         Health Maintenance     Health Maintenance   Topic Date Due   • HPV Vaccine (3 - 3-dose SCDM series) 05/09/2022   • Annual Physical  04/12/2024   • COVID-19 Vaccine (5 - 2023-24 season) 09/01/2024   • Influenza Vaccine (1) 09/01/2024   • PT PLAN OF CARE  09/06/2024   • Depression  Screening  09/05/2025   • Cervical Cancer Screening  01/10/2029   • DTaP,Tdap,and Td Vaccines (3 - Td or Tdap) 04/25/2034   • Zoster Vaccine (1 of 2) 12/01/2037   • RSV Vaccine Age 60+ Years (1 - 1-dose 60+ series) 12/01/2047   • HIV Screening  Completed   • Hepatitis C Screening  Completed   • RSV Vaccine age 0-20 Months  Aged Out   • Pneumococcal Vaccine: Pediatrics (0 to 5 Years) and At-Risk Patients (6 to 64 Years)  Aged Out   • HIB Vaccine  Aged Out   • IPV Vaccine  Aged Out   • Hepatitis A Vaccine  Aged Out   • Meningococcal ACWY Vaccine  Aged Out     Immunization History   Administered Date(s) Administered   • COVID-19 PFIZER VACCINE 0.3 ML IM 04/07/2021, 05/03/2021, 11/22/2021   • COVID-19 Pfizer Vac BIVALENT Cameron-sucrose 12 Yr+ IM 11/07/2022   • HPV9 11/09/2021, 12/09/2021   • Hep A, adult 02/07/2018, 02/01/2019   • INFLUENZA 08/01/2019, 09/09/2020, 11/22/2021   • Influenza Quadrivalent Preservative Free 3 years and older IM 11/22/2021   • Tdap 04/12/2023, 04/25/2024   • Typhoid, Unspecified 02/07/2018   • Typhoid, ViCPs 02/07/2018   • Yellow Fever 02/07/2018          Carole Jiménez DO

## 2024-09-05 NOTE — PATIENT INSTRUCTIONS
Go to pharmacy for boosters of the following:  MMR   And  Varicella   Titers were low for rubella and varicella at early pregnancy    Also get flu shot       Schedule with derm and podiatry

## 2024-09-16 ENCOUNTER — TELEPHONE (OUTPATIENT)
Dept: NEUROLOGY | Facility: CLINIC | Age: 39
End: 2024-09-16

## 2024-09-16 NOTE — TELEPHONE ENCOUNTER
LMOM for pt to confirm their  8:30a   appt on   9/25  w/ Tineo . Reminded pt to arrive 15 mins prior to appt to check in with . Gave call back number [phone] to cancel/reschedule.

## 2024-09-21 ENCOUNTER — TELEPHONE (OUTPATIENT)
Dept: FAMILY MEDICINE CLINIC | Facility: CLINIC | Age: 39
End: 2024-09-21

## 2024-09-21 NOTE — TELEPHONE ENCOUNTER
Received message from dr romero - podiatry - asking if he was ok with pt taking lamisil since she is on the zoloft.  I just replied that he should inform patient that the combination can increase the effect of zoloft.  And to monitor

## 2024-09-25 ENCOUNTER — OFFICE VISIT (OUTPATIENT)
Dept: NEUROLOGY | Facility: CLINIC | Age: 39
End: 2024-09-25
Payer: COMMERCIAL

## 2024-09-25 VITALS
WEIGHT: 195.3 LBS | OXYGEN SATURATION: 97 % | DIASTOLIC BLOOD PRESSURE: 84 MMHG | BODY MASS INDEX: 28.93 KG/M2 | HEART RATE: 88 BPM | SYSTOLIC BLOOD PRESSURE: 122 MMHG | TEMPERATURE: 98.1 F | HEIGHT: 69 IN

## 2024-09-25 DIAGNOSIS — G43.109 MIGRAINE WITH AURA AND WITHOUT STATUS MIGRAINOSUS, NOT INTRACTABLE: Primary | ICD-10-CM

## 2024-09-25 DIAGNOSIS — F41.9 ANXIETY DISORDER: ICD-10-CM

## 2024-09-25 PROCEDURE — 99214 OFFICE O/P EST MOD 30 MIN: CPT | Performed by: STUDENT IN AN ORGANIZED HEALTH CARE EDUCATION/TRAINING PROGRAM

## 2024-09-25 RX ORDER — NAPROXEN 500 MG/1
500 TABLET ORAL DAILY PRN
Qty: 15 TABLET | Refills: 2 | Status: SHIPPED | OUTPATIENT
Start: 2024-09-25

## 2024-09-25 NOTE — ASSESSMENT & PLAN NOTE
Priya is here today for follow-up for her migraines.  She is a few months postpartum at this time and reports that she is doing fairly well.  She currently endorses about 2 headache days per month of which 1 may be more severe.  She continues with magnesium and B2 supplements daily without any issues.  From an abortive standpoint, naproxen tends to work well so she plans to continue with that.  She did report an aura associated with a migraine after her delivery, but she was also being treated for preeclampsia at that time.  This has not happened again since.  I do not think we need to pursue any neuroimaging at this time, but should she experience more frequent auras in the future or any other red flags or concerning signs/symptoms, we can obtain an MRI at that time.

## 2024-09-25 NOTE — PATIENT INSTRUCTIONS
Headache Calendar  Please maintain a headache calendar  Consider using phone applications such as Migraine Rafael or [place] Migraine Tracker     Headache/migraine treatment:   Acute medications (for immediate treatment of a headache):   It is ok to take acetaminophen (Tylenol)/Naproxen if they help your headaches you should limit these to No more than 2-3 times a week to avoid medication overuse/rebound headaches.      Over the counter preventive supplements for headaches/migraines  [x] Magnesium 400mg daily (If any diarrhea or upset stomach, decrease dose  as tolerated) Oxide or glycinate  [x] Riboflavin (Vitamin B2) 400mg daily - (FYI B2 may make your urine bright/neon yellow)     Lifestyle Recommendations:  [x] SLEEP - Maintain a regular sleep schedule: Adults need at least 7-8 hours of uninterrupted a night. Maintain good sleep hygiene:  Going to bed and waking up at consistent times, avoiding excessive daytime naps, avoiding caffeinated beverages in the evening, avoid excessive stimulation in the evening and generally using bed primarily for sleeping.  One hour before bedtime would recommend turning lights down lower, decreasing your activity (may read quietly, listen to music at a low volume). When you get into bed, should eliminate all technology (no texting, emailing, playing with your phone, iPad or tablet in bed).  [x] HYDRATION - Maintain good hydration.  Drink  2L of fluid a day (4 typical small water bottles)  [x] DIET - Maintain good nutrition. In particular don't skip meals and try and eat healthy balanced meals regularly.  [x] TRIGGERS - Look for other triggers and avoid them: Limit caffeine to 1-2 cups a day or less. Avoid dietary triggers that you have noticed bring on your headaches (this could include aged cheese, peanuts, MSG, aspartame and nitrates).  [x] EXERCISE - physical exercise as we all know is good for you in many ways, and not only is good for your heart, but also is beneficial for  your mental health, cognitive health and  chronic pain/headaches. I would encourage at the least 5 days of physical exercise weekly for at least 30 minutes.      Education and Follow-up  [x] Please call with any questions or concerns. Of course if any new concerning symptoms go to the emergency department.  [x] Follow up in 6 months

## 2024-09-25 NOTE — PROGRESS NOTES
Since your last visit are your headaches back to pre-pregnancy    Any change to the headache type? No     What is your current headache frequency (total headache days out of 30): 1-2/30 (of those, how many are more severe: 1)    Are you taking your current medications as prescribed? yes    Do you have any side effects? no    How may days per week do you take an abortive medicine? 0-1/7  Naproxen     + HA w/ aura after birth

## 2024-09-25 NOTE — PROGRESS NOTES
Neurology Ambulatory Visit  Name: Priya Johnston       : 1985       MRN: [id number]   Encounter Provider: Stu Tineo DO   Encounter Date: 2024  Encounter department: [facility] [place]    Assessment and Plan  1. Migraine with aura and without status migrainosus, not intractable  Assessment & Plan:  Priya is here today for follow-up for her migraines.  She is a few months postpartum at this time and reports that she is doing fairly well.  She currently endorses about 2 headache days per month of which 1 may be more severe.  She continues with magnesium and B2 supplements daily without any issues.  From an abortive standpoint, naproxen tends to work well so she plans to continue with that.  She did report an aura associated with a migraine after her delivery, but she was also being treated for preeclampsia at that time.  This has not happened again since.  I do not think we need to pursue any neuroimaging at this time, but should she experience more frequent auras in the future or any other red flags or concerning signs/symptoms, we can obtain an MRI at that time.  Orders:  -     naproxen (EC NAPROSYN) 500 MG EC tablet; Take 1 tablet (500 mg total) by mouth daily as needed (migraine)  2. Anxiety disorder      She will Return in about 6 months (around 3/25/2025).    Workup  - Neurologic assessment reveals unremarkable neurological exam.  - With no new or concerning symptoms, no red flags and an unremarkable neurologic exam, there is no specific indication for further evaluation with MRI brain.  However, this could be obtained at any time if indicated.      Preventative:  - we discussed headache hygiene and lifestyle factors that may improve headaches  - Mg and B2  - Currently on through other providers: Sertraline  - Past/ failed/contraindicated: Aimovig (stopped due to planning for pregnancy), Topamax (did not help), Candesartan (light headed), Gabapentin (worked initially,  but then stopped)  - future options: Memantine, Diamox, Propranolol, Botox, cyproheptadine, CGRP     Acute:  - discussed not taking over-the-counter or prescription pain medications more than 2-3 days per week to prevent medication overuse/rebound headache  - Currently on through other providers: None  - Past/ failed/contraindicated: Compazine (did not help), Rizatriptan (helped)  - future options:  Triptan, Toradol IM or p.o., dexamethasone 2 mg daily for prolonged migraine, ubrelvy, reyvow, nurtec, zavzpret    History of Present Illness     Priya Penaloza is a delightful 38 y.o. female with a past medical history that includes migraines and anxiety here for f/u evaluation of headache.     Interval updates:  9/25/24: Since her last visit, she delivered on 6/28/2024.  She was seen in the OB office on 7/26/2024 where it was noted that she was being treated for preeclampsia with Procardia.  At today's visit, she endorses about 1-2 headache days per month of which 1 can be more severe.  She continues with magnesium and B2 supplements daily.  From an abortive standpoint, Naproxen is working well.  She notes that she had a migraine with aura initially after birth, but has not had any episodes since. She is no longer breast feeding as of last week.    Prior History  5/29/24: Since our last visit, she was seen by Gigi in November 2023 at which time she reported that she was about 7 weeks pregnant.  She had stopped all of her medications at that time, but continued with magnesium and B2 supplements.  At today's visit, she reports about 2-3 headache days per month. She does not find Tylenol to be as helpful. She continues with Mg and B2 supplements daily.   5/31/23: Since her last visit, she reports that her headaches have improved.  Currently experiencing about 1-4 headache days per month.  She is taking magnesium and B2 supplements daily. Reports that the severity of the headaches have decreased as well. Naproxen works  "for abortive management, but when she needs to take Maxalt it works as well.    11/1/22: Ms. Penaloza reports a longstanding history of migraines for which she has previously followed with Neurology.  She was most recently seen at the [facility] but because she moved she was lost to follow-up.  She reports that she used to be on Aimovig but stopped it as she is considering pregnancy within the next 6 months.  From an abortive standpoint she has been taking naproxen about 2-3 times per week but finds that it may be losing its efficacy.  We discussed that due to her infrequent headaches at this time I do not think she needs a prescription preventive medication especially with the potential for pregnancy in the near future.  However, I suggested that she try magnesium and riboflavin supplements as they are safe in pregnancy as well.  From an abortive standpoint, I emphasized that while triptans are considered somewhat safe in pregnancy depending on each individual case there is always a risk of birth defects.  We discussed that it is likely safe pre pregnancy but as soon as she finds that she is pregnant she should stop it.  I have asked her to keep me updated as time goes on and if we consider any medications in the future it will be a joint discussion between myself, the patient, and OBGYN.         Objective     /84 (BP Location: Left arm, Patient Position: Sitting, Cuff Size: Standard)   Pulse 88   Temp 98.1 °F (36.7 °C) (Temporal)   Ht 5' 9\" (1.753 m)   Wt 88.6 kg (195 lb 4.8 oz)   LMP  (LMP Unknown) Comment: Mid october  SpO2 97%   BMI 28.84 kg/m²    Physical Exam:  HEENT:  Normocephalic atraumatic. See neuro exam   Chest:  Respirations appear regular and unlabored.    Cardiovascular:  no observed significant swelling.    Musculoskeletal:  (see below under neurologic exam for evaluation of motor function and gait)   Skin:  warm and dry, not diaphoretic.    Psychiatric:  Normal behavior " and appropriate affect       Neurological Examination:     Mental status/cognitive function:   Recent and remote memory intact. Attention span and concentration as well as fund of knowledge are appropriate for age. Normal language and spontaneous speech.  Cranial Nerves:  III, IV, VI-Pupils were equal, round. Extraocular movements were full and conjugate   VII-facial expression symmetric  VIII-hearing grossly intact bilaterally   Motor Exam: symmetric bulk throughout. no atrophy, fasciculations or abnormal movements noted.   Coordination:  no apparent dysmetria, ataxia or tremor noted  Gait: steady casual gait    I have spent 15 minutes with the patient today in which greater than 50% of this time was spent in counseling/coordination of care regarding Prognosis, Risks and benefits of tx options, Patient and family education, Importance of tx compliance, Impressions, Documenting in the medical record, and Obtaining or reviewing history  . I also spent 15 minutes non face to face for this patient the same day.     Activity Minutes   Precharting/reviewing 10   Patient care/counseling 15   Postcharting/care coordination 5       Voice recognition software was used in the generation of this note. There may be unintentional errors including grammatical errors, spelling errors, or pronoun errors.

## 2024-10-28 ENCOUNTER — PATIENT MESSAGE (OUTPATIENT)
Dept: NEUROLOGY | Facility: CLINIC | Age: 39
End: 2024-10-28

## 2024-10-28 DIAGNOSIS — G43.109 MIGRAINE WITH AURA AND WITHOUT STATUS MIGRAINOSUS, NOT INTRACTABLE: Primary | ICD-10-CM

## 2024-10-29 RX ORDER — RIZATRIPTAN BENZOATE 10 MG/1
10 TABLET ORAL AS NEEDED
Qty: 10 TABLET | Refills: 6 | Status: SHIPPED | OUTPATIENT
Start: 2024-10-29

## 2024-11-23 DIAGNOSIS — L70.9 ACNE, UNSPECIFIED ACNE TYPE: ICD-10-CM

## 2024-11-26 RX ORDER — ERYTHROMYCIN AND BENZOYL PEROXIDE 30; 50 MG/G; MG/G
GEL TOPICAL
Qty: 46.6 G | Refills: 1 | Status: SHIPPED | OUTPATIENT
Start: 2024-11-26

## 2024-12-05 ENCOUNTER — PATIENT MESSAGE (OUTPATIENT)
Dept: NEUROLOGY | Facility: CLINIC | Age: 39
End: 2024-12-05

## 2024-12-05 DIAGNOSIS — G43.109 MIGRAINE WITH AURA AND WITHOUT STATUS MIGRAINOSUS, NOT INTRACTABLE: Primary | ICD-10-CM

## 2024-12-05 RX ORDER — PREDNISONE 20 MG/1
TABLET ORAL
Qty: 12 TABLET | Refills: 0 | Status: SHIPPED | OUTPATIENT
Start: 2024-12-05 | End: 2024-12-11

## 2025-01-15 ENCOUNTER — ANNUAL EXAM (OUTPATIENT)
Dept: OBGYN CLINIC | Facility: CLINIC | Age: 40
End: 2025-01-15
Payer: COMMERCIAL

## 2025-01-15 VITALS
DIASTOLIC BLOOD PRESSURE: 74 MMHG | SYSTOLIC BLOOD PRESSURE: 124 MMHG | WEIGHT: 208 LBS | HEIGHT: 69 IN | BODY MASS INDEX: 30.81 KG/M2

## 2025-01-15 DIAGNOSIS — Z01.419 WELL WOMAN EXAM WITH ROUTINE GYNECOLOGICAL EXAM: Primary | ICD-10-CM

## 2025-01-15 PROCEDURE — S0612 ANNUAL GYNECOLOGICAL EXAMINA: HCPCS | Performed by: OBSTETRICS & GYNECOLOGY

## 2025-01-15 NOTE — PROGRESS NOTES
ASSESSMENT & PLAN:   Diagnoses and all orders for this visit:    Well woman exam with routine gynecological exam          The following were reviewed in today's visit: ASCCP guidelines, Gardisil vaccination, STD testing breast self exam, use and side effects of OCPs, family planning choices, exercise, and healthy diet.    Patient to return to office in yearly for annual exam.     All questions have been answered to her satisfaction.        CC:  Annual Gynecologic Examination  Chief Complaint   Patient presents with    Gynecologic Exam     Neg pap/HPV 1/10/24       HPI: Priya Johnston is a 39 y.o.  who presents for annual gynecologic examination.  She has the following concerns:  Discussed return of menstrual headaches occurring just before her period starts. Relieved with pregnancy.       Health Maintenance:    Exercise: frequently  Breast exams/breast awareness: yes    Past Medical History:   Diagnosis Date    Cluster headache 2009    Headache, tension-type 2005    Migraine 2009    Preeclampsia, severe 2024     (spontaneous vaginal delivery) 2024    Varicella     disease       Past Surgical History:   Procedure Laterality Date    WISDOM TOOTH EXTRACTION N/A        Past OB/Gyn History:  Period Cycle (Days): 28  Period Duration (Days): 4  Period Pattern: Regular  Menstrual Flow: Heavy, Moderate, Light  Dysmenorrhea: (!) Mild  Dysmenorrhea Symptoms: HeadachePatient's last menstrual period was 2024.    Last Pap:  : no abnormalities  History of abnormal Pap smear: no  HPV vaccine completed: yes    Patient is currently sexually active.   STD testing: no  Current contraception:progesterone-only pill (POP) contraceptive, micronor      Family History  Family History   Problem Relation Age of Onset    Osteoporosis Mother     Migraines Mother     Asthma Mother     Diabetes Mother     Cancer Father         prostate    Diabetes Father     Dementia Maternal Grandmother      Migraines Maternal Grandmother     No Known Problems Half-Brother     No Known Problems Half-Sister        Family history of uterine or ovarian cancer: no  Family history of breast cancer: no  Family history of colon cancer: no    Social History:  Social History     Socioeconomic History    Marital status: /Civil Union     Spouse name: Not on file    Number of children: Not on file    Years of education: Not on file    Highest education level: Not on file   Occupational History    Not on file   Tobacco Use    Smoking status: Never    Smokeless tobacco: Never   Vaping Use    Vaping status: Never Used   Substance and Sexual Activity    Alcohol use: Yes     Alcohol/week: 2.0 standard drinks of alcohol     Types: 2 Cans of beer per week     Comment: socially    Drug use: Not Currently     Comment: nothing within the past 2 years (THC in past)    Sexual activity: Yes     Partners: Male   Other Topics Concern    Not on file   Social History Narrative    Not on file     Social Drivers of Health     Financial Resource Strain: Not on file   Food Insecurity: No Food Insecurity (5/6/2024)    Nursing - Inadequate Food Risk Classification     Worried About Running Out of Food in the Last Year: Never true     Ran Out of Food in the Last Year: Never true     Ran Out of Food in the Last Year: Not on file   Transportation Needs: No Transportation Needs (5/6/2024)    PRAPARE - Transportation     Lack of Transportation (Medical): No     Lack of Transportation (Non-Medical): No   Physical Activity: Not on file   Stress: Not on file   Social Connections: Not on file   Intimate Partner Violence: Not on file   Housing Stability: Low Risk  (5/6/2024)    Housing Stability Vital Sign     Unable to Pay for Housing in the Last Year: No     Number of Times Moved in the Last Year: 1     Homeless in the Last Year: No     Domestic violence screen: negative    Allergies:  No Known Allergies    Medications:    Current Outpatient  "Medications:     magnesium 30 MG tablet, Take 30 mg by mouth 2 (two) times a day, Disp: , Rfl:     naproxen (EC NAPROSYN) 500 MG EC tablet, Take 1 tablet (500 mg total) by mouth daily as needed (migraine), Disp: 15 tablet, Rfl: 2    norethindrone (Ortho Micronor) 0.35 MG tablet, Take 1 tablet (0.35 mg total) by mouth daily, Disp: 84 tablet, Rfl: 1    Prenatal Vit-Fe Fumarate-FA (PRENATAL PO), Take by mouth, Disp: , Rfl:     Riboflavin (VITAMIN B-2 PO), Take by mouth, Disp: , Rfl:     rizatriptan (Maxalt) 10 mg tablet, Take 1 tablet (10 mg total) by mouth as needed for migraine Take at the onset of migraine; if symptoms continue or return, may take another dose at least 2 hours after first dose. Take no more than 2 doses in a day., Disp: 10 tablet, Rfl: 6    sertraline (ZOLOFT) 50 mg tablet, TAKE 1 tab po daily, Disp: 90 tablet, Rfl: 0    Review of Systems:  Review of Systems   Constitutional:  Negative for activity change, appetite change and unexpected weight change.   Respiratory:  Negative for cough and shortness of breath.    Cardiovascular:  Negative for chest pain.   Gastrointestinal:  Negative for abdominal pain, constipation, diarrhea, nausea and vomiting.   Genitourinary:  Positive for menstrual problem. Negative for difficulty urinating, dyspareunia, frequency, pelvic pain, urgency, vaginal bleeding, vaginal discharge and vaginal pain.   Musculoskeletal:  Negative for back pain.   Skin: Negative.    Neurological:  Positive for headaches. Negative for dizziness, weakness and light-headedness.   Psychiatric/Behavioral: Negative.           Physical Exam:  /74 (BP Location: Right arm, Patient Position: Sitting, Cuff Size: Standard)   Ht 5' 9\" (1.753 m)   Wt 94.3 kg (208 lb)   LMP 12/27/2024   BMI 30.72 kg/m²    Physical Exam  Constitutional:       General: She is not in acute distress.     Appearance: Normal appearance. She is well-developed. She is not diaphoretic.   Genitourinary:      Vulva and " bladder normal.      No lesions in the vagina.      Genitourinary Comments: Perineum normal in appearance, no lacerations, no ulcerations, no lesions visualized.      Right Labia: No rash, tenderness or lesions.     Left Labia: No tenderness, lesions or rash.     No inguinal adenopathy present in the right or left side.     No vaginal discharge, erythema, tenderness or bleeding.      No vaginal prolapse present.     No vaginal atrophy present.       Right Adnexa: not tender, not full and no mass present.     Left Adnexa: not tender, not full and no mass present.     Cervix is parous.      No cervical motion tenderness, discharge, friability, lesion or polyp.      No parametrium nodularity or thickening present.     Uterus is not enlarged, tender or prolapsed.      No uterine mass detected.     Uterus is midaxial.      No urethral prolapse or mass present.      Bladder is not tender.       Pelvic exam was performed with patient in the lithotomy position.   Rectum:      No tenderness or external hemorrhoid.   Breasts:     Breasts are symmetrical.      Right: No swelling, bleeding, mass, skin change or tenderness.      Left: No swelling, bleeding, mass, skin change or tenderness.   HENT:      Head: Normocephalic and atraumatic.   Neck:      Thyroid: No thyromegaly or thyroid tenderness.   Cardiovascular:      Rate and Rhythm: Normal rate and regular rhythm.      Heart sounds: Normal heart sounds. No murmur heard.     No friction rub.   Pulmonary:      Effort: Pulmonary effort is normal. No respiratory distress.      Breath sounds: Normal breath sounds. No wheezing or rales.   Abdominal:      Palpations: Abdomen is soft. There is no mass.      Tenderness: There is no abdominal tenderness. There is no guarding.   Musculoskeletal:         General: No tenderness. Normal range of motion.      Right lower leg: No edema.      Left lower leg: No edema.   Lymphadenopathy:      Lower Body: No right inguinal adenopathy. No left  inguinal adenopathy.   Neurological:      Mental Status: She is alert and oriented to person, place, and time.   Skin:     General: Skin is warm and dry.      Coloration: Skin is not pale.      Findings: No erythema.   Psychiatric:         Mood and Affect: Mood normal.         Behavior: Behavior normal.         Thought Content: Thought content normal.         Judgment: Judgment normal.   Vitals and nursing note reviewed.

## 2025-01-18 DIAGNOSIS — Z30.011 ENCOUNTER FOR INITIAL PRESCRIPTION OF CONTRACEPTIVE PILLS: ICD-10-CM

## 2025-01-20 RX ORDER — NORETHINDRONE 0.35 MG/1
1 TABLET ORAL DAILY
Qty: 84 TABLET | Refills: 1 | Status: SHIPPED | OUTPATIENT
Start: 2025-01-20

## 2025-01-25 DIAGNOSIS — F41.8 DEPRESSION WITH ANXIETY: ICD-10-CM

## 2025-02-05 ENCOUNTER — TELEPHONE (OUTPATIENT)
Age: 40
End: 2025-02-05

## 2025-02-05 RX ORDER — PROPRANOLOL HYDROCHLORIDE 60 MG/1
60 CAPSULE, EXTENDED RELEASE ORAL
Qty: 30 CAPSULE | Refills: 6 | Status: SHIPPED | OUTPATIENT
Start: 2025-02-05

## 2025-02-05 RX ORDER — SUMATRIPTAN SUCCINATE 100 MG/1
100 TABLET ORAL ONCE AS NEEDED
Qty: 9 TABLET | Refills: 5 | Status: SHIPPED | OUTPATIENT
Start: 2025-02-05

## 2025-02-05 NOTE — TELEPHONE ENCOUNTER
Pt calling stating her headaches have been getting bad and wanted to schedule an appt. Offered open slot for today with Dr Tineo but pt declined. Accepted 3/4 at 330pm. Added to wait list.

## 2025-02-28 DIAGNOSIS — G43.109 MIGRAINE WITH AURA AND WITHOUT STATUS MIGRAINOSUS, NOT INTRACTABLE: ICD-10-CM

## 2025-02-28 RX ORDER — PROPRANOLOL HYDROCHLORIDE 60 MG/1
60 CAPSULE, EXTENDED RELEASE ORAL
Qty: 90 CAPSULE | Refills: 1 | Status: SHIPPED | OUTPATIENT
Start: 2025-02-28

## 2025-03-22 ENCOUNTER — OFFICE VISIT (OUTPATIENT)
Dept: URGENT CARE | Age: 40
End: 2025-03-22
Payer: COMMERCIAL

## 2025-03-22 VITALS
SYSTOLIC BLOOD PRESSURE: 120 MMHG | HEART RATE: 113 BPM | DIASTOLIC BLOOD PRESSURE: 82 MMHG | RESPIRATION RATE: 18 BRPM | OXYGEN SATURATION: 98 % | TEMPERATURE: 98 F

## 2025-03-22 DIAGNOSIS — J06.9 UPPER RESPIRATORY TRACT INFECTION, UNSPECIFIED TYPE: ICD-10-CM

## 2025-03-22 DIAGNOSIS — J01.10 ACUTE NON-RECURRENT FRONTAL SINUSITIS: Primary | ICD-10-CM

## 2025-03-22 PROCEDURE — G0382 LEV 3 HOSP TYPE B ED VISIT: HCPCS

## 2025-03-22 PROCEDURE — S9083 URGENT CARE CENTER GLOBAL: HCPCS

## 2025-03-22 RX ORDER — AMOXICILLIN 500 MG/1
500 CAPSULE ORAL EVERY 12 HOURS SCHEDULED
Qty: 14 CAPSULE | Refills: 0 | Status: SHIPPED | OUTPATIENT
Start: 2025-03-22 | End: 2025-03-29

## 2025-03-22 NOTE — PROGRESS NOTES
[facility]  Name: Priya Johnston      : 1985      MRN: [id number]  Encounter Provider: JOCY Bass  Encounter Date: 3/22/2025   [facility]  :  Please begin antibiotics as directed.   Refer to Safe Medications During Pregnancy sheet given by Ob/Gyn-this will provide guidance regarding OTC meds that you can take.   Ensure good hydration.   Follow up with PCP or Ob/Gyn if no relief within one week.   Assessment & Plan  Upper respiratory tract infection, unspecified type         Acute non-recurrent frontal sinusitis    Orders:    amoxicillin (AMOXIL) 500 mg capsule; Take 1 capsule (500 mg total) by mouth every 12 (twelve) hours for 7 days        Patient Instructions  Patient Education     Sinusitis, Adult ED   General Information   You came to the Emergency Department (ED) for sinusitis. Your sinuses are hollow areas in the bones of your face. They have a thin lining that normally makes a small amount of mucus. When you have sinusitis, the lining gets swollen and makes extra mucus. You may have sinusitis with or after a cold. Most of the time sinusitis will get better in 1 to 2 weeks.  Sinusitis is most often caused by a virus, so antibiotics won’t help. But some people do need antibiotics. If the doctor ordered antibiotics for you, be sure to follow the instructions. It is important to take all of your antibiotics even if you start to feel better.  What care is needed at home?   Call your regular doctor to let them know you were in the ED. Make a follow-up appointment if you were told to.  Try to thin the mucus.  Drink lots of liquids to stay hydrated.  Use a cool mist humidifier to avoid dry air.  Use saline nose drops or a saline nose rinse to relieve stuffiness.  Wash your hands often. This will help keep others healthy.  Do not smoke or be in smoke-filled places. Avoid things that may cause breathing problems like fumes, pollution, dust, and  other common allergens and irritants.  You may want to take medicine like ibuprofen, naproxen, or acetaminophen to help with pain.  When do I need to get emergency help?   Return to the ED if:   You have a stiff neck, especially if you also have fever, chills, vomiting, or severe headache  You have trouble thinking clearly.  You have trouble seeing or have double vision.  You have swelling or redness or pain around one or both eyes.  You have a fever of 102°F (38.9°C) or higher, or have shaking chills or sweats.  When do I need to call the doctor?   You have an upset stomach and throwing up.  You have more pain in your face and head.  You are not getting better within 1 to 2 weeks.  You have new or worsening symptoms.  Last Reviewed Date   2020-08-03  Consumer Information Use and Disclaimer   This generalized information is a limited summary of diagnosis, treatment, and/or medication information. It is not meant to be comprehensive and should be used as a tool to help the user understand and/or assess potential diagnostic and treatment options. It does NOT include all information about conditions, treatments, medications, side effects, or risks that may apply to a specific patient. It is not intended to be medical advice or a substitute for the medical advice, diagnosis, or treatment of a health care provider based on the health care provider's examination and assessment of a patient’s specific and unique circumstances. Patients must speak with a health care provider for complete information about their health, medical questions, and treatment options, including any risks or benefits regarding use of medications. This information does not endorse any treatments or medications as safe, effective, or approved for treating a specific patient. [employer] and its affiliates disclaim any warranty or liability relating to this information or the use thereof. The use of this information is governed by the Terms of Use,  "available at https://www.[employer].com/en/know/clinical-effectiveness-terms   Copyright   Follow up with PCP in 3-5 days.  Proceed to  ER if symptoms worsen.    If tests are performed, our office will contact you with results only if changes need to made to the care plan discussed with you at the visit. You can review your full results on [facility] MyChart.    Chief Complaint:   Chief Complaint   Patient presents with    Sore Throat    Cough     Patient states that for about a week she has had sore throat, productive cough, and body aches. She notes improvement overall in symptoms but still has chest congestion. Patient 8 weeks pregnant     History of Present Illness   Patient is a 39 year old female, currently approximately 8 weeks pregnant, who presents for evaluation of sinus congestion and cough over the past week. She notes that these types of symptoms generally develop into a \"sinus infection\" for her.     Cough  This is a new problem. The current episode started in the past 7 days (x 1 week). The problem has been gradually worsening. The problem occurs hourly. The cough is Productive of sputum. Associated symptoms include myalgias, nasal congestion, postnasal drip and a sore throat. Pertinent negatives include no chest pain, chills, ear congestion, ear pain, eye redness, fever, headaches, heartburn, hemoptysis, rash, rhinorrhea, shortness of breath, sweats, weight loss or wheezing. Nothing aggravates the symptoms. Treatments tried: Tylenol. The treatment provided no relief. There is no history of asthma, bronchiectasis, bronchitis, COPD, emphysema, environmental allergies or pneumonia.         Review of Systems   Constitutional:  Negative for chills, fatigue, fever and weight loss.   HENT:  Positive for congestion, postnasal drip and sore throat. Negative for ear discharge, ear pain, rhinorrhea, sinus pressure, sinus pain and sneezing.    Eyes: Negative.  Negative for pain, discharge, redness and " itching.   Respiratory:  Positive for cough. Negative for apnea, hemoptysis, choking, chest tightness, shortness of breath, wheezing and stridor.    Cardiovascular: Negative.  Negative for chest pain and palpitations.   Gastrointestinal: Negative.  Negative for diarrhea, heartburn, nausea and vomiting.   Endocrine: Negative.  Negative for polydipsia, polyphagia and polyuria.   Genitourinary: Negative.  Negative for decreased urine volume and flank pain.   Musculoskeletal:  Positive for myalgias. Negative for arthralgias, back pain, neck pain and neck stiffness.   Skin: Negative.  Negative for color change and rash.   Allergic/Immunologic: Negative.  Negative for environmental allergies.   Neurological: Negative.  Negative for dizziness, facial asymmetry, light-headedness, numbness and headaches.   Hematological: Negative.  Negative for adenopathy.   Psychiatric/Behavioral: Negative.       Past Medical History   Past Medical History:   Diagnosis Date    Cluster headache 2009    Headache, tension-type 2005    Migraine 2009    Preeclampsia, severe 2024     (spontaneous vaginal delivery) 2024    Varicella     disease     Past Surgical History:   Procedure Laterality Date    WISDOM TOOTH EXTRACTION N/A      Family History   Problem Relation Age of Onset    Osteoporosis Mother     Migraines Mother     Asthma Mother     Diabetes Mother     Cancer Father         prostate    Diabetes Father     Dementia Maternal Grandmother     Migraines Maternal Grandmother     No Known Problems Half-Brother     No Known Problems Half-Sister      she reports that she has never smoked. She has never used smokeless tobacco. She reports current alcohol use of about 2.0 standard drinks of alcohol per week. She reports that she does not currently use drugs.  Current Outpatient Medications   Medication Instructions    amoxicillin (AMOXIL) 500 mg, Oral, Every 12 hours scheduled    Jencycla 0.35 mg, Oral,  Daily    magnesium 30 mg, 2 times daily    naproxen (EC NAPROSYN) 500 mg, Oral, Daily PRN    Prenatal Vit-Fe Fumarate-FA (PRENATAL PO) Take by mouth    propranolol (INDERAL LA) 60 mg, Oral, Daily at bedtime    Riboflavin (VITAMIN B-2 PO) Take by mouth    sertraline (ZOLOFT) 50 mg, Oral, Daily    SUMAtriptan (IMITREX) 100 mg, Oral, Once as needed, May repeat x1 in 2 hours, max 2/24 hours, 9/month   No Known Allergies     Objective   /82   Pulse (!) 113   Temp 98 °F (36.7 °C)   Resp 18   SpO2 98%      Physical Exam  Vitals and nursing note reviewed.   Constitutional:       General: She is not in acute distress.     Appearance: She is well-developed. She is not ill-appearing, toxic-appearing or diaphoretic.      Interventions: She is not intubated.  HENT:      Head: Normocephalic and atraumatic.      Right Ear: Hearing, tympanic membrane, ear canal and external ear normal. Tympanic membrane is not erythematous or bulging.      Left Ear: Hearing, tympanic membrane, ear canal and external ear normal. Tympanic membrane is not erythematous or bulging.      Mouth/Throat:      Pharynx: Oropharynx is clear. Uvula midline. No pharyngeal swelling, oropharyngeal exudate, posterior oropharyngeal erythema, uvula swelling or postnasal drip.      Tonsils: No tonsillar exudate or tonsillar abscesses. 1+ on the right. 1+ on the left.   Eyes:      Conjunctiva/sclera: Conjunctivae normal.   Neck:      Thyroid: No thyroid mass, thyromegaly or thyroid tenderness.   Cardiovascular:      Rate and Rhythm: Normal rate and regular rhythm.      Heart sounds: Normal heart sounds, S1 normal and S2 normal. Heart sounds not distant. No murmur heard.  Pulmonary:      Effort: Pulmonary effort is normal. No tachypnea, bradypnea, accessory muscle usage, prolonged expiration, respiratory distress or retractions. She is not intubated.      Breath sounds: Normal breath sounds. No stridor, decreased air movement or transmitted upper airway  "sounds. No decreased breath sounds, wheezing, rhonchi or rales.   Abdominal:      Palpations: Abdomen is soft.      Tenderness: There is no abdominal tenderness.   Musculoskeletal:         General: No swelling.      Cervical back: Full passive range of motion without pain, normal range of motion and neck supple. No spinous process tenderness or muscular tenderness. Normal range of motion.   Lymphadenopathy:      Cervical: No cervical adenopathy.      Right cervical: No superficial cervical adenopathy.     Left cervical: No superficial cervical adenopathy.   Skin:     General: Skin is warm and dry.      Capillary Refill: Capillary refill takes less than 2 seconds.   Neurological:      Mental Status: She is alert.   Psychiatric:         Mood and Affect: Mood normal.         Portions of the record may have been created with voice recognition software.  Occasional wrong word or \"sound a like\" substitutions may have occurred due to the inherent limitations of voice recognition software.  Read the chart carefully and recognize, using context, where substitutions have occurred.  "

## 2025-03-22 NOTE — PATIENT INSTRUCTIONS
Please begin antibiotics as directed.   Refer to Safe Medications During Pregnancy sheet given by Ob/Gyn-this will provide guidance regarding OTC meds that you can take.   Ensure good hydration.   Follow up with PCP or Ob/Gyn if no relief within one week.

## 2025-03-23 ENCOUNTER — PATIENT MESSAGE (OUTPATIENT)
Dept: OBGYN CLINIC | Facility: CLINIC | Age: 40
End: 2025-03-23

## 2025-03-24 ENCOUNTER — NURSE TRIAGE (OUTPATIENT)
Age: 40
End: 2025-03-24

## 2025-03-24 NOTE — TELEPHONE ENCOUNTER
"FOLLOW UP: Discuss with provider and call patient back    REASON FOR CONVERSATION: Urinary Symptoms    SYMPTOMS: 8w0d per lmp of 1/27/2025, urine flow difficult to start, needs to press on bladder to empty. Does not feel emptying completely- vaginal pressure    OTHER: H/O incarcerated bladder resulting in duron cath (12/2023)prior pregnancy resolved on it's own. No urology work up.  Priya concerned she is again having the same symptoms with this pregnancy.     DISPOSITION: See Within 2 Weeks in Office  ESC to Dr. Rodriguez  Late entry ESC reply: continue to do cat/cow exercise at home. Present to ED if unable to urinate. Will follow up at appt on Friday    Patient notified and in agreement to plan.   Reason for Disposition   All other urine symptoms    Answer Assessment - Initial Assessment Questions  1. SYMPTOM: \"What's the main symptom you're concerned about?\" (e.g., frequency, incontinence)      Feels need to urinate, does not feel it is all coming out- has to push on bladder to empty  2. ONSET: \"When did the  symptom  start?\"      friday  3. PAIN: \"Is there any pain?\" If Yes, ask: \"How bad is it?\" (Scale: 1-10; mild, moderate, severe)      denies  4. CAUSE: \"What do you think is causing the symptoms?\"      Incarcerated bladder  5. OTHER SYMPTOMS: \"Do you have any other symptoms?\" (e.g., blood in urine, fever, flank pain, pain with urination)      denies  6. PREGNANCY: \"Is there any chance you are pregnant?\" \"When was your last menstrual period?\"      LMP 1/27/2025    Protocols used: Urinary Symptoms-Adult-[place]    "

## 2025-03-24 NOTE — TELEPHONE ENCOUNTER
Regarding: newly preg. trouble urinating incarcerated uterus?  ----- Message from Angella CRAIG sent at 3/24/2025 11:21 AM EDT -----  Pt sent a myc message that with her last pregnancy she had an incarcerated uterus. She said she had to get a duron to empty her bladder. She is feeling again that she is having a hard time emptying her bladder. She has to push really hard to go. Sent a message yesterday. Has an upcoming apt on the 28th.

## 2025-03-28 ENCOUNTER — HOSPITAL ENCOUNTER (EMERGENCY)
Facility: HOSPITAL | Age: 40
Discharge: HOME | End: 2025-03-28
Attending: EMERGENCY MEDICINE
Payer: COMMERCIAL

## 2025-03-28 ENCOUNTER — ULTRASOUND (OUTPATIENT)
Dept: OBGYN CLINIC | Facility: CLINIC | Age: 40
End: 2025-03-28
Payer: COMMERCIAL

## 2025-03-28 VITALS
BODY MASS INDEX: 30.07 KG/M2 | SYSTOLIC BLOOD PRESSURE: 122 MMHG | WEIGHT: 203 LBS | HEIGHT: 69 IN | DIASTOLIC BLOOD PRESSURE: 74 MMHG

## 2025-03-28 VITALS
RESPIRATION RATE: 16 BRPM | TEMPERATURE: 98 F | SYSTOLIC BLOOD PRESSURE: 135 MMHG | HEART RATE: 89 BPM | OXYGEN SATURATION: 98 % | DIASTOLIC BLOOD PRESSURE: 76 MMHG

## 2025-03-28 VITALS
HEART RATE: 79 BPM | RESPIRATION RATE: 18 BRPM | OXYGEN SATURATION: 99 % | TEMPERATURE: 98.1 F | DIASTOLIC BLOOD PRESSURE: 63 MMHG | SYSTOLIC BLOOD PRESSURE: 112 MMHG

## 2025-03-28 DIAGNOSIS — T83.9XXA PROBLEM WITH FOLEY CATHETER, INITIAL ENCOUNTER (HCC): Primary | ICD-10-CM

## 2025-03-28 DIAGNOSIS — R33.9 URINARY RETENTION: Primary | ICD-10-CM

## 2025-03-28 DIAGNOSIS — Z3A.08 8 WEEKS GESTATION OF PREGNANCY: ICD-10-CM

## 2025-03-28 DIAGNOSIS — R39.198 DIFFICULTY URINATING: ICD-10-CM

## 2025-03-28 DIAGNOSIS — Z32.01 PREGNANCY, CONFIRMED, NOT FIRST: Primary | ICD-10-CM

## 2025-03-28 LAB
BILIRUB UR QL STRIP: NEGATIVE
CLARITY UR: CLEAR
COLOR UR: NORMAL
GLUCOSE UR STRIP-MCNC: NEGATIVE MG/DL
HGB UR QL STRIP.AUTO: NEGATIVE
KETONES UR STRIP-MCNC: NEGATIVE MG/DL
LEUKOCYTE ESTERASE UR QL STRIP: NEGATIVE
NITRITE UR QL STRIP: NEGATIVE
PH UR STRIP.AUTO: 7 [PH]
PROT UR STRIP-MCNC: NEGATIVE MG/DL
SP GR UR STRIP.AUTO: 1.01 (ref 1–1.03)
UROBILINOGEN UR STRIP-ACNC: <2 MG/DL

## 2025-03-28 PROCEDURE — 87086 URINE CULTURE/COLONY COUNT: CPT

## 2025-03-28 PROCEDURE — 99283 EMERGENCY DEPT VISIT LOW MDM: CPT

## 2025-03-28 PROCEDURE — 81003 URINALYSIS AUTO W/O SCOPE: CPT

## 2025-03-28 PROCEDURE — 99284 EMERGENCY DEPT VISIT MOD MDM: CPT | Performed by: EMERGENCY MEDICINE

## 2025-03-28 PROCEDURE — 76815 OB US LIMITED FETUS(S): CPT | Performed by: EMERGENCY MEDICINE

## 2025-03-28 PROCEDURE — 99214 OFFICE O/P EST MOD 30 MIN: CPT

## 2025-03-28 PROCEDURE — 76817 TRANSVAGINAL US OBSTETRIC: CPT

## 2025-03-28 PROCEDURE — 51702 INSERT TEMP BLADDER CATH: CPT | Performed by: EMERGENCY MEDICINE

## 2025-03-28 NOTE — ED NOTES
Patient teaching on how to empty leg bag. Pt also given nightly drainage bag and educated on how to switch duron bags. Pt demonstrates understanding, no further questions.      Mirlande Knight RN  03/28/25 [id number]

## 2025-03-28 NOTE — ED PROVIDER NOTES
Time reflects when diagnosis was documented in both MDM as applicable and the Disposition within this note       Time User Action Codes Description Comment    3/28/2025  6:41 AM Nahum Landin Add [R33.9] Urinary retention           ED Disposition       ED Disposition   Discharge    Condition   Stable    Date/Time   Fri Mar 28, 2025  6:41 AM    Comment   Priya Johnston discharge to home/self care.                   Assessment & Plan       Medical Decision Making  Patient is a 39-year-old female G2, P1, at 8 weeks gestation presenting to the emergency department due to urinary retention that has been worsening over the past week.  Patient reports that she had similar symptoms during her prior pregnancy and required a Franks catheter.  Patient reports that she has been able to urinate less and less over the past week.  Denies any fever, chills, nausea, vomiting, dysuria, or hematuria.  Notes slightly increased abdominal fullness.  Follows with [facility] and has a prenatal visit scheduled for today at 1 PM.    Bedside POCUS shows single IUP with fetal heart rate 168.  Ultrasound of bladder shows estimated bladder volume 192 mL    Plan to place Franks, will send urine for urinalysis as patient is pregnant we will assess for asymptomatic bacteriuria that may require treatment.    Franks returned to 300 cc of clear urine.  Urinalysis sent.  Urinalysis negative for UTI.  Patient giving leg bag Franks.    Ambulatory referral for urology sent due to patient's urinary retension.    Plan to discharge patient home with appropriate follow-up with OB/GYN and urology.     Amount and/or Complexity of Data Reviewed  Labs: ordered.             Medications - No data to display    ED Risk Strat Scores                            SBIRT 20yo+      Flowsheet Row Most Recent Value   Initial Alcohol Screen: US AUDIT-C     1. How often do you have a drink containing alcohol? 0 Filed at: 03/28/2025 0544   2. How many drinks  containing alcohol do you have on a typical day you are drinking?  0 Filed at: 2025   3a. Male UNDER 65: How often do you have five or more drinks on one occasion? 0 Filed at: 2025   3b. FEMALE Any Age, or MALE 65+: How often do you have 4 or more drinks on one occassion? 0 Filed at: 2025   Audit-C Score 0 Filed at: 2025   MICHELLE: How many times in the past year have you...    Used an illegal drug or used a prescription medication for non-medical reasons? Never Filed at: 2025                            History of Present Illness       Chief Complaint   Patient presents with    Urinary Retention     Pt 8 weeks pregnant, 2nd pregnancy, reports has been unable to urinate, went a little bit last night. This did happen in her first pregnancy and she required a duron catheter x5 days. HX pre-eclampsia w/ first       Past Medical History:   Diagnosis Date    Cluster headache 2009    Headache, tension-type 2005    Migraine 2009    Preeclampsia, severe 2024     (spontaneous vaginal delivery) 2024    Varicella     disease      Past Surgical History:   Procedure Laterality Date    WISDOM TOOTH EXTRACTION N/A       Family History   Problem Relation Age of Onset    Osteoporosis Mother     Migraines Mother     Asthma Mother     Diabetes Mother     Cancer Father         prostate    Diabetes Father     Dementia Maternal Grandmother     Migraines Maternal Grandmother     No Known Problems Half-Brother     No Known Problems Half-Sister       Social History     Tobacco Use    Smoking status: Never    Smokeless tobacco: Never   Vaping Use    Vaping status: Never Used   Substance Use Topics    Alcohol use: Yes     Alcohol/week: 2.0 standard drinks of alcohol     Types: 2 Cans of beer per week     Comment: socially    Drug use: Not Currently     Comment: nothing within the past 2 years (THC in past)      E-Cigarette/Vaping    E-Cigarette Use  Never User       E-Cigarette/Vaping Substances    Nicotine No     THC No     CBD No     Flavoring No     Other No     Unknown No       I have reviewed and agree with the history as documented.     Patient is a 39-year-old female G2, P1, at 8 weeks gestation presenting to the emergency department due to urinary retention that has been worsening over the past week.  Patient reports that she had similar symptoms during her prior pregnancy and required a Franks catheter.  Patient reports that she has been able to urinate less and less over the past week.  Denies any fever, chills, nausea, vomiting, dysuria, or hematuria.  Notes slightly increased abdominal fullness.  Follows with [place] [facility] and has a prenatal visit scheduled for today at 1 PM.          Review of Systems   Genitourinary:  Positive for difficulty urinating.   All other systems reviewed and are negative.          Objective       ED Triage Vitals [03/28/25 0542]   Temperature Pulse Blood Pressure Respirations SpO2 Patient Position - Orthostatic VS   98.1 °F (36.7 °C) 104 135/65 18 100 % Lying      Temp Source Heart Rate Source BP Location FiO2 (%) Pain Score    Oral Monitor Right arm -- --      Vitals      Date and Time Temp Pulse SpO2 Resp BP Pain Score FACES Pain Rating User   03/28/25 0643 -- 79 99 % -- 112/63 -- -- KB   03/28/25 0545 -- 115 99 % -- 135/65 -- -- KB   03/28/25 0542 98.1 °F (36.7 °C) 104 100 % 18 135/65 -- -- MM            Physical Exam  Vitals and nursing note reviewed.   Constitutional:       General: She is in acute distress (Secondary to urinary retention).      Appearance: Normal appearance.   HENT:      Head: Normocephalic and atraumatic.   Eyes:      Extraocular Movements: Extraocular movements intact.      Conjunctiva/sclera: Conjunctivae normal.   Cardiovascular:      Rate and Rhythm: Normal rate and regular rhythm.      Pulses: Normal pulses.      Heart sounds: Normal heart sounds. No murmur heard.  Pulmonary:       Effort: Pulmonary effort is normal. No respiratory distress.      Breath sounds: Normal breath sounds.   Abdominal:      General: There is no distension.      Palpations: Abdomen is soft.      Tenderness: There is no abdominal tenderness. There is no guarding or rebound.   Musculoskeletal:         General: No tenderness. Normal range of motion.      Cervical back: Normal range of motion.      Right lower leg: No edema.      Left lower leg: No edema.   Skin:     General: Skin is warm and dry.      Capillary Refill: Capillary refill takes less than 2 seconds.      Findings: No rash.   Neurological:      General: No focal deficit present.      Mental Status: She is alert and oriented to person, place, and time.      Sensory: No sensory deficit.   Psychiatric:         Mood and Affect: Mood normal.         Behavior: Behavior normal.         Results Reviewed       Procedure Component Value Units Date/Time    UA w Reflex to Microscopic w Reflex to Culture [[id number]] Collected: 03/28/25 0641    Lab Status: Final result Specimen: Urine, Indwelling Franks Catheter Updated: 03/28/25 0727     Color, UA Light Yellow     Clarity, UA Clear     Specific Gravity, UA 1.013     pH, UA 7.0     Leukocytes, UA Negative     Nitrite, UA Negative     Protein, UA Negative mg/dl      Glucose, UA Negative mg/dl      Ketones, UA Negative mg/dl      Urobilinogen, UA <2.0 mg/dl      Bilirubin, UA Negative     Occult Blood, UA Negative     URINE COMMENT --    Urine culture [[id number]] Collected: 03/28/25 0641    Lab Status: In process Specimen: Urine, Indwelling Franks Catheter Updated: 03/28/25 0727            No orders to display       POC Pelvic US    Date/Time: 3/28/2025 6:32 AM    Performed by: Nahum Landin MD  Authorized by: Nahum Landin MD    Patient location:  Bedside  Procedure details:     Exam Type:  Diagnostic    Indications: evaluate for IUP      Assessment for: evaluate fetal viability      Image quality: diagnostic      Image  availability:  Images available in PACS, still images obtained and video obtained  Uterine findings:     Intrauterine pregnancy: identified      Single gestation: identified      Fetal heart rate: identified      Fetal heart rate (bpm):  168  Interpretation:     Ultrasound impressions: normal      Pregnancy findings: intrauterine pregnancy (IUP)        ED Medication and Procedure Management   Prior to Admission Medications   Prescriptions Last Dose Informant Patient Reported? Taking?   Jencycla 0.35 MG tablet   No No   Sig: TAKE 1 TABLET BY MOUTH EVERY DAY   Prenatal Vit-Fe Fumarate-FA (PRENATAL PO)  Self Yes No   Sig: Take by mouth   Riboflavin (VITAMIN B-2 PO)  Self Yes No   Sig: Take by mouth   Patient not taking: Reported on 3/22/2025   SUMAtriptan (IMITREX) 100 mg tablet   No No   Sig: Take 1 tablet (100 mg total) by mouth once as needed for migraine May repeat x1 in 2 hours, max 2/24 hours, 9/month   Patient not taking: Reported on 3/22/2025   amoxicillin (AMOXIL) 500 mg capsule   No No   Sig: Take 1 capsule (500 mg total) by mouth every 12 (twelve) hours for 7 days   magnesium 30 MG tablet  Self Yes No   Sig: Take 30 mg by mouth 2 (two) times a day   Patient not taking: Reported on 3/22/2025   naproxen (EC NAPROSYN) 500 MG EC tablet   No No   Sig: Take 1 tablet (500 mg total) by mouth daily as needed (migraine)   Patient not taking: Reported on 3/22/2025   propranolol (INDERAL LA) 60 mg 24 hr capsule   No No   Sig: TAKE 1 CAPSULE (60 MG TOTAL) BY MOUTH DAILY AT BEDTIME   Patient not taking: Reported on 3/22/2025   sertraline (ZOLOFT) 50 mg tablet   No No   Sig: TAKE 1 TABLET BY MOUTH EVERY DAY   Patient not taking: Reported on 3/22/2025      Facility-Administered Medications: None     Patient's Medications   Discharge Prescriptions    No medications on file       ED SEPSIS DOCUMENTATION   Time reflects when diagnosis was documented in both MDM as applicable and the Disposition within this note       Time User  Action Codes Description Comment    3/28/2025  6:41 AM Nahum Landin Add [R33.9] Urinary retention                  Nahum Landin MD  03/28/25 0732

## 2025-03-28 NOTE — ED ATTENDING ATTESTATION
3/28/2025  I, Tyler Woodard MD, saw and evaluated the patient. I have discussed the patient with the resident/non-physician practitioner and agree with the resident's/non-physician practitioner's findings, Plan of Care, and MDM as documented in the resident's/non-physician practitioner's note, except where noted. All available labs and Radiology studies were reviewed.  I was present for key portions of any procedure(s) performed by the resident/non-physician practitioner and I was immediately available to provide assistance.       At this point I agree with the current assessment done in the Emergency Department.  I have conducted an independent evaluation of this patient a history and physical is as follows: Patient is a 39 year old female with difficulty urinating since last night at 2300. (+) nausea due to pregnancy. LMP - 1/27/25. Patient is 8 weeks pregnant. Has had prior urinary retention with previous pregnancy.  No vaginal bleeding. No difficulty with BMs. No fever. Was last seen at [facility] in [place] on 3/22/25 for sinusitis. [facility] website checked on this patient and no Rx found. NCAT. No scleral icterus. Moist mucous membranes. Lungs clear. Tachycardia without murmur. Abdomen soft with no tenderness. (+) bowel sounds. No edema. No rash noted. DDX including but not limited to: urinary retention,  hematuria, UTI; doubt tumor, neurologic etiology. POCUS done by ED resident and duron ordered. Will check UA. Will need f/u with OB and urology.     ED Course         Critical Care Time  Procedures

## 2025-03-28 NOTE — PROGRESS NOTES
Assessment/Plan:  Diagnoses and all orders for this visit:    Pregnancy, confirmed, not first  -     Ambulatory Referral to Maternal Fetal Medicine; Future  -     AMB US OB < 14 weeks single or first gestation level 1    Difficulty urinating  -     Ambulatory Referral to Physical Therapy; Future    8 weeks gestation of pregnancy  -     Ambulatory Referral to Physical Therapy; Future      - Viable IUP @ 8w 4d EGA  - RAMILA 11/3/2025  - Continue PNV  - Patient to call for concerns  - RTO in 2 days for duron catheter removal  - Patient to begin pelvic floor PT  - RTO ~ 10-12 weeks for OB intake  - call MFM for 13 week NT scan/genetic testing.     Subjective:       Patient ID: Priya Johnston [id number]     Priya Johnston is a 39 y.o.  presenting to the office for pregnancy confirmation. Patient's last menstrual period was 2025. , placing her at 8w4d today with RAMILA of 11/3/2025. She is feeling well.     She had an incarcerated uterus in her first pregnancy and felt similar symptoms over the past few days. She was seen at the ED this AM for difficulty emptying her bladder. She has a duron catheter in place. IUP was seen at ED.     Regular periods:yes  Nausea:yes  Vomiting: no  Bleeding: no  Cramping: no  Headaches: no  Fatigue: yes  Constipation: no  Blood type, if known: O+       OB History    Para Term  AB Living   3 1 1  1 1   SAB IAB Ectopic Multiple Live Births    1  0 1      # Outcome Date GA Lbr [place]/2nd Weight Sex Type Anes PTL Lv   3 Current            2 Term 24 37w6d / 01:07 2920 g (6 lb 7 oz) M Vag-Spont EPI N CARLOS   1 IAB               Obstetric Comments   Menarche 16     The following portions of the patient's history were reviewed and updated as appropriate: allergies, current medications, past family history, past medical history, past social history, past surgical history, and problem list.    Allergies:  Patient has no known allergies.    Medications:    Current Outpatient  "Medications:     amoxicillin (AMOXIL) 500 mg capsule, Take 1 capsule (500 mg total) by mouth every 12 (twelve) hours for 7 days, Disp: 14 capsule, Rfl: 0    magnesium 30 MG tablet, Take 30 mg by mouth 2 (two) times a day, Disp: , Rfl:     Prenatal Vit-Fe Fumarate-FA (PRENATAL PO), Take by mouth, Disp: , Rfl:     Riboflavin (VITAMIN B-2 PO), Take by mouth, Disp: , Rfl:     sertraline (ZOLOFT) 50 mg tablet, TAKE 1 TABLET BY MOUTH EVERY DAY, Disp: 90 tablet, Rfl: 1    Jencycla 0.35 MG tablet, TAKE 1 TABLET BY MOUTH EVERY DAY, Disp: 84 tablet, Rfl: 1    naproxen (EC NAPROSYN) 500 MG EC tablet, Take 1 tablet (500 mg total) by mouth daily as needed (migraine) (Patient not taking: Reported on 3/22/2025), Disp: 15 tablet, Rfl: 2    propranolol (INDERAL LA) 60 mg 24 hr capsule, TAKE 1 CAPSULE (60 MG TOTAL) BY MOUTH DAILY AT BEDTIME (Patient not taking: Reported on 3/22/2025), Disp: 90 capsule, Rfl: 1    SUMAtriptan (IMITREX) 100 mg tablet, Take 1 tablet (100 mg total) by mouth once as needed for migraine May repeat x1 in 2 hours, max 2/24 hours, 9/month (Patient not taking: Reported on 3/22/2025), Disp: 9 tablet, Rfl: 5      Objective:    Visit Vitals  /74 (BP Location: Right arm, Patient Position: Sitting, Cuff Size: Standard)   Ht 5' 9\" (1.753 m)   Wt 92.1 kg (203 lb)   LMP 01/27/2025   BMI 29.98 kg/m²   OB Status Pregnant   Smoking Status Never   BSA 2.08 m²     GEN: The patient was alert and oriented x3, pleasant well-appearing female in no acute distress.   PULM: nonlabored respirations  MSK: Normal gait  : WNL  Skin: warm, dry  Neuro: no focal deficits  Psych: normal affect and judgement, cooperative    Ultrasound:     Viability US     Gestational sac: present               Location: intrauterine  Yolk sac: present  Fetal pole: present               CRL: 1.85 cm = 8w2d  Cardiac activity: present               Rate: 169 bpm     Cul de sac: absence of free fluid  Uterus: normal in appearance           Ultrasound " Probe Disinfection    A transvaginal ultrasound was performed.   Prior to use, disinfection was performed with High Level Disinfection Process (Trophon)  Probe serial number [id number]: [id number] was used    Shavon Hall PA-C  03/28/25  3:37 PM

## 2025-03-28 NOTE — DISCHARGE INSTRUCTIONS
Please follow-up with your OB/GYN as scheduled.    Please also follow-up with urology for your urinary retention.  An ambulatory referral has been sent for you and information can be found below.    Please refer to the attached instructions for how to care for your urinary catheter.

## 2025-03-29 LAB — BACTERIA UR CULT: NORMAL

## 2025-03-29 NOTE — ED NOTES
RN clarified patient is comfortable caring for duron catheter at home and that she does not need additional supplies. Verified duron patency prior to discharge. Patient discharged wearing a leg bag, has a 2 liter bag at home.      Oliva Cee RN  03/28/25 7930

## 2025-03-29 NOTE — ED PROVIDER NOTES
Time reflects when diagnosis was documented in both MDM as applicable and the Disposition within this note       Time User Action Codes Description Comment    3/28/2025 10:35 PM Nahum Landin Add [T83.9XXA] Problem with Franks catheter, initial encounter (HCC)           ED Disposition       ED Disposition   Discharge    Condition   Stable    Date/Time   Fri Mar 28, 2025 10:35 PM    Comment   Priya Johnston discharge to home/self care.                   Assessment & Plan       Medical Decision Making  Patient is a 39-year-old female G2, P1 at 8 weeks and 4 days presents emergency department due to leakage from around her Franks catheter.  Patient presents emergency department this morning for urinary retention.  Franks catheter was placed returning 300 cc of urine.  Denies any urinary symptoms. Urinalysis negative for asymptomatic bacteriuria.  She reports that some urine has been draining into the bag however, the catheter feels slightly uncomfortable particularly as it has been moving despite being secured via a leg bag.  Had similar symptoms with her prior pregnancy.  Following with [facility], attended an appointment this afternoon which she was assessed.  Her OB/GYN plans to remove Franks catheter on Monday.    On exam, there is a 16 Kuwaiti catheter in place clear yellow urine in Franks bag.  Bedside ultrasound shows urinary catheter balloon in place within the bladder which is decompressed.  Balloon was aspirated returning 9 cc of clear saline.    Given balloons is confirmed in place, patient complaining of urine leaking from around catheter, and she is experiencing discomfort with movement of catheter, will attempt Franks exchange and replacement with 18 Kuwaiti catheter.    Patient reports improvement of her pain.  Franks catheter draining appropriately.  Encourage patient to maintain her appoint with OB/GYN on Monday.  All questions answered.  Patient discharged in stable condition              Medications - No data to display    ED Risk Strat Scores                                                History of Present Illness       Chief Complaint   Patient presents with    Urinary Catheter Problem     Pt had duron placed this AM for acute urinary retention. Pt reports she is leaking urine around catheter entry and that there is little urine in the bag. Reports bladder pressure. States the duron feels uncomfortable compared to her last experience w/ a duron.        Past Medical History:   Diagnosis Date    Cluster headache 2009    Headache, tension-type 2005    Migraine 2009    Preeclampsia, severe 2024     (spontaneous vaginal delivery) 2024    Varicella     disease      Past Surgical History:   Procedure Laterality Date    WISDOM TOOTH EXTRACTION N/A       Family History   Problem Relation Age of Onset    Osteoporosis Mother     Migraines Mother     Asthma Mother     Diabetes Mother     Cancer Father         prostate    Diabetes Father     Dementia Maternal Grandmother     Migraines Maternal Grandmother     No Known Problems Half-Brother     No Known Problems Half-Sister       Social History     Tobacco Use    Smoking status: Never    Smokeless tobacco: Never   Vaping Use    Vaping status: Never Used   Substance Use Topics    Alcohol use: Not Currently     Alcohol/week: 2.0 standard drinks of alcohol     Types: 2 Cans of beer per week     Comment: socially    Drug use: Not Currently     Comment: nothing within the past 2 years (THC in past)      E-Cigarette/Vaping    E-Cigarette Use Never User       E-Cigarette/Vaping Substances    Nicotine No     THC No     CBD No     Flavoring No     Other No     Unknown No       I have reviewed and agree with the history as documented.     Patient is a 39-year-old female G2, P1 at 8 weeks and 4 days presents emergency department due to leakage from around her Duron catheter.  Patient presents emergency department this morning  for urinary retention.  Franks catheter was placed returning 300 cc of urine.  Denies any urinary symptoms. Urinalysis negative for asymptomatic bacteriuria.  She reports that some urine has been draining into the bag however, the catheter feels slightly uncomfortable particularly as it has been moving despite being secured via a leg bag.  Had similar symptoms with her prior pregnancy.  Following with [facility], attended an appointment this afternoon which she was assessed.  Her OB/GYN plans to remove Franks catheter on Monday.      Urinary Catheter Problem      Review of Systems   Genitourinary:  Positive for difficulty urinating.   All other systems reviewed and are negative.          Objective       ED Triage Vitals [03/28/25 2207]   Temperature Pulse Blood Pressure Respirations SpO2 Patient Position - Orthostatic VS   98 °F (36.7 °C) 89 135/76 16 98 % Sitting      Temp Source Heart Rate Source BP Location FiO2 (%) Pain Score    Oral Monitor Right arm -- --      Vitals      Date and Time Temp Pulse SpO2 Resp BP Pain Score FACES Pain Rating User   03/28/25 2207 98 °F (36.7 °C) 89 98 % 16 135/76 -- -- AH            Physical Exam  Vitals and nursing note reviewed.   Constitutional:       General: She is in acute distress (Secondary to Franks catheter dysfunction).      Appearance: Normal appearance.   HENT:      Head: Normocephalic and atraumatic.   Eyes:      Extraocular Movements: Extraocular movements intact.      Conjunctiva/sclera: Conjunctivae normal.   Cardiovascular:      Rate and Rhythm: Normal rate and regular rhythm.      Pulses: Normal pulses.      Heart sounds: Normal heart sounds. No murmur heard.  Pulmonary:      Effort: Pulmonary effort is normal. No respiratory distress.      Breath sounds: Normal breath sounds.   Abdominal:      General: There is no distension.      Palpations: Abdomen is soft.      Tenderness: There is no abdominal tenderness. There is no guarding or rebound.    Genitourinary:     Comments: Chaperone present.  16 French Franks catheter in place with balloon inflated.  Clear yellow urine in Franks bag.  Musculoskeletal:         General: No tenderness. Normal range of motion.      Cervical back: Normal range of motion.      Right lower leg: No edema.      Left lower leg: No edema.   Skin:     General: Skin is warm and dry.      Capillary Refill: Capillary refill takes less than 2 seconds.      Findings: No rash.   Neurological:      General: No focal deficit present.      Mental Status: She is alert and oriented to person, place, and time.      Sensory: No sensory deficit.   Psychiatric:         Mood and Affect: Mood normal.         Behavior: Behavior normal.         Results Reviewed       None            No orders to display       Procedures    ED Medication and Procedure Management   Prior to Admission Medications   Prescriptions Last Dose Informant Patient Reported? Taking?   Jencycla 0.35 MG tablet   No No   Sig: TAKE 1 TABLET BY MOUTH EVERY DAY   Prenatal Vit-Fe Fumarate-FA (PRENATAL PO)  Self Yes No   Sig: Take by mouth   Riboflavin (VITAMIN B-2 PO)  Self Yes No   Sig: Take by mouth   SUMAtriptan (IMITREX) 100 mg tablet   No No   Sig: Take 1 tablet (100 mg total) by mouth once as needed for migraine May repeat x1 in 2 hours, max 2/24 hours, 9/month   Patient not taking: Reported on 3/22/2025   amoxicillin (AMOXIL) 500 mg capsule   No No   Sig: Take 1 capsule (500 mg total) by mouth every 12 (twelve) hours for 7 days   magnesium 30 MG tablet  Self Yes No   Sig: Take 30 mg by mouth 2 (two) times a day   naproxen (EC NAPROSYN) 500 MG EC tablet   No No   Sig: Take 1 tablet (500 mg total) by mouth daily as needed (migraine)   Patient not taking: Reported on 3/22/2025   propranolol (INDERAL LA) 60 mg 24 hr capsule   No No   Sig: TAKE 1 CAPSULE (60 MG TOTAL) BY MOUTH DAILY AT BEDTIME   Patient not taking: Reported on 3/22/2025   sertraline (ZOLOFT) 50 mg tablet   No No   Sig:  TAKE 1 TABLET BY MOUTH EVERY DAY      Facility-Administered Medications: None     Discharge Medication List as of 3/28/2025 10:36 PM        CONTINUE these medications which have NOT CHANGED    Details   amoxicillin (AMOXIL) 500 mg capsule Take 1 capsule (500 mg total) by mouth every 12 (twelve) hours for 7 days, Starting Sat 3/22/2025, Until Sat 3/29/2025, Normal      Jencycla 0.35 MG tablet TAKE 1 TABLET BY MOUTH EVERY DAY, Starting Mon 1/20/2025, Normal      magnesium 30 MG tablet Take 30 mg by mouth 2 (two) times a day, Historical Med      naproxen (EC NAPROSYN) 500 MG EC tablet Take 1 tablet (500 mg total) by mouth daily as needed (migraine), Starting Wed 9/25/2024, Normal      Prenatal Vit-Fe Fumarate-FA (PRENATAL PO) Take by mouth, Historical Med      propranolol (INDERAL LA) 60 mg 24 hr capsule TAKE 1 CAPSULE (60 MG TOTAL) BY MOUTH DAILY AT BEDTIME, Starting Fri 2/28/2025, Normal      Riboflavin (VITAMIN B-2 PO) Take by mouth, Historical Med      sertraline (ZOLOFT) 50 mg tablet TAKE 1 TABLET BY MOUTH EVERY DAY, Starting Sat 1/25/2025, Normal      SUMAtriptan (IMITREX) 100 mg tablet Take 1 tablet (100 mg total) by mouth once as needed for migraine May repeat x1 in 2 hours, max 2/24 hours, 9/month, Starting Wed 2/5/2025, Normal           No discharge procedures on file.  ED SEPSIS DOCUMENTATION   Time reflects when diagnosis was documented in both MDM as applicable and the Disposition within this note       Time User Action Codes Description Comment    3/28/2025 10:35 PM Nahum Landin Add [T83.9XXA] Problem with Franks catheter, initial encounter (HCC)                  Nahum Landin MD  03/29/25 0248

## 2025-03-29 NOTE — ED ATTENDING ATTESTATION
3/28/2025  I, Tyler Woodard MD, saw and evaluated the patient. I have discussed the patient with the resident/non-physician practitioner and agree with the resident's/non-physician practitioner's findings, Plan of Care, and MDM as documented in the resident's/non-physician practitioner's note, except where noted. All available labs and Radiology studies were reviewed.  I was present for key portions of any procedure(s) performed by the resident/non-physician practitioner and I was immediately available to provide assistance.       At this point I agree with the current assessment done in the Emergency Department.  I have conducted an independent evaluation of this patient a history and physical is as follows: Patient is a 39 year old female with leakage around duron catheter today. Was last seen in this ED on 3/28/25 for urinary retention and got 16 Arabic duron catheter and UA was negative. Patient is 8 weeks pregnant. No fever. No vaginal bleeding. No abdominal pain. [facility] website checked on this patient and no Rx found. NCAT. No scleral icterus. Moist mucous membranes. Lungs clear. Heart regular without murmur. Abdomen soft and nontender. Good bowel sounds. No edema. No rash noted. DDx including but not limited to: duron catheter malfunction; doubt urethral tear, UTI, hematuria; need for new duron catheter; doubt coagulopathy, metabolic abnormality, dehydration, MARCEL, purple urine bag syndrome; doubt tumor or renal colic.  Will replace duron with 18 Divehi duron and patient to f/u with OBGYN this Monday. No diagnostics needed at this time.     ED Course         Critical Care Time  Procedures

## 2025-03-31 ENCOUNTER — ROUTINE PRENATAL (OUTPATIENT)
Dept: OBGYN CLINIC | Facility: CLINIC | Age: 40
End: 2025-03-31

## 2025-03-31 VITALS — DIASTOLIC BLOOD PRESSURE: 70 MMHG | WEIGHT: 204 LBS | BODY MASS INDEX: 30.13 KG/M2 | SYSTOLIC BLOOD PRESSURE: 114 MMHG

## 2025-03-31 DIAGNOSIS — Z3A.09 9 WEEKS GESTATION OF PREGNANCY: ICD-10-CM

## 2025-03-31 DIAGNOSIS — R39.198 DIFFICULTY URINATING: ICD-10-CM

## 2025-03-31 DIAGNOSIS — Z97.8 FOLEY CATHETER IN PLACE: Primary | ICD-10-CM

## 2025-03-31 PROCEDURE — PNV

## 2025-03-31 NOTE — PROGRESS NOTES
- Duron catheter removed in office today and patient able to urinate in the office today on her own.     ASSESSMENT/PLAN:    Encounter Diagnosis     ICD-10-CM    1. Duron catheter in place  Z97.8       2. Difficulty urinating  R39.198       3. 9 weeks gestation of pregnancy  Z3A.09         SUBJECTIVE/HPI:      Patient ID: Priya Johnston [id number]     Priya Johnston is a 39 y.o.  presenting to the office for duron catheter removal. 18 French duron catheter in place due to urinary retention, placed in the ED. She is 9 weeks pregnant.       HISTORY:    Patient Active Problem List   Diagnosis   • Migraine with aura and without status migrainosus, not intractable   • History of pre-eclampsia       No Known Allergies      Current Outpatient Medications:   •  magnesium 30 MG tablet, Take 30 mg by mouth 2 (two) times a day, Disp: , Rfl:   •  Prenatal Vit-Fe Fumarate-FA (PRENATAL PO), Take by mouth, Disp: , Rfl:   •  Riboflavin (VITAMIN B-2 PO), Take by mouth, Disp: , Rfl:   •  sertraline (ZOLOFT) 50 mg tablet, TAKE 1 TABLET BY MOUTH EVERY DAY, Disp: 90 tablet, Rfl: 1  •  Jencycla 0.35 MG tablet, TAKE 1 TABLET BY MOUTH EVERY DAY, Disp: 84 tablet, Rfl: 1  •  naproxen (EC NAPROSYN) 500 MG EC tablet, Take 1 tablet (500 mg total) by mouth daily as needed (migraine) (Patient not taking: Reported on 3/22/2025), Disp: 15 tablet, Rfl: 2  •  propranolol (INDERAL LA) 60 mg 24 hr capsule, TAKE 1 CAPSULE (60 MG TOTAL) BY MOUTH DAILY AT BEDTIME (Patient not taking: Reported on 3/22/2025), Disp: 90 capsule, Rfl: 1  •  SUMAtriptan (IMITREX) 100 mg tablet, Take 1 tablet (100 mg total) by mouth once as needed for migraine May repeat x1 in 2 hours, max 2/24 hours, 9/month (Patient not taking: Reported on 3/22/2025), Disp: 9 tablet, Rfl: 5    OB History          3    Para   1    Term   1            AB   1    Living   1         SAB        IAB   1    Ectopic        Multiple   0    Live Births   1           Obstetric  Comments   Menarche 16               Past Medical History:   Diagnosis Date   • Cluster headache 2009   • Headache, tension-type 2005   • Migraine 2009   • Preeclampsia, severe 2024   •  (spontaneous vaginal delivery) 2024   • Varicella     disease        Past Surgical History:   Procedure Laterality Date   • WISDOM TOOTH EXTRACTION N/A         Family History   Problem Relation Age of Onset   • Osteoporosis Mother    • Migraines Mother    • Asthma Mother    • Diabetes Mother    • Cancer Father         prostate   • Diabetes Father    • Dementia Maternal Grandmother    • Migraines Maternal Grandmother    • No Known Problems Half-Brother    • No Known Problems Half-Sister       OBJECTIVE:    Visit Vitals  /70   Wt 92.5 kg (204 lb)   LMP 2025   BMI 30.13 kg/m²   OB Status Pregnant   Smoking Status Never   BSA 2.08 m²     Physical Exam  Constitutional:       Appearance: Normal appearance.   Genitourinary:      Vulva and urethral meatus normal.      No vaginal discharge.   HENT:      Head: Normocephalic and atraumatic.   Neurological:      General: No focal deficit present.      Mental Status: She is alert and oriented to person, place, and time.   Psychiatric:         Mood and Affect: Mood normal.         Behavior: Behavior normal.   Vitals and nursing note reviewed.

## 2025-04-01 ENCOUNTER — HOSPITAL ENCOUNTER (EMERGENCY)
Facility: HOSPITAL | Age: 40
Discharge: HOME | End: 2025-04-01
Attending: EMERGENCY MEDICINE
Payer: COMMERCIAL

## 2025-04-01 ENCOUNTER — APPOINTMENT (EMERGENCY)
Dept: ULTRASOUND IMAGING | Facility: HOSPITAL | Age: 40
End: 2025-04-01
Payer: COMMERCIAL

## 2025-04-01 VITALS
TEMPERATURE: 98.4 F | SYSTOLIC BLOOD PRESSURE: 133 MMHG | RESPIRATION RATE: 16 BRPM | HEART RATE: 99 BPM | DIASTOLIC BLOOD PRESSURE: 79 MMHG | OXYGEN SATURATION: 98 %

## 2025-04-01 DIAGNOSIS — R33.9 URINARY RETENTION: Primary | ICD-10-CM

## 2025-04-01 LAB
BILIRUB UR QL STRIP: NEGATIVE
CLARITY UR: CLEAR
COLOR UR: YELLOW
GLUCOSE UR STRIP-MCNC: NEGATIVE MG/DL
HGB UR QL STRIP.AUTO: NEGATIVE
KETONES UR STRIP-MCNC: NEGATIVE MG/DL
LEUKOCYTE ESTERASE UR QL STRIP: NEGATIVE
NITRITE UR QL STRIP: NEGATIVE
PH UR STRIP.AUTO: 6.5 [PH]
PROT UR STRIP-MCNC: NEGATIVE MG/DL
SP GR UR STRIP.AUTO: 1.01 (ref 1–1.03)
UROBILINOGEN UR STRIP-ACNC: <2 MG/DL

## 2025-04-01 PROCEDURE — 81003 URINALYSIS AUTO W/O SCOPE: CPT | Performed by: EMERGENCY MEDICINE

## 2025-04-01 PROCEDURE — 51702 INSERT TEMP BLADDER CATH: CPT | Performed by: EMERGENCY MEDICINE

## 2025-04-01 PROCEDURE — NC001 PR NO CHARGE: Performed by: EMERGENCY MEDICINE

## 2025-04-01 PROCEDURE — 87086 URINE CULTURE/COLONY COUNT: CPT | Performed by: EMERGENCY MEDICINE

## 2025-04-01 PROCEDURE — 76801 OB US < 14 WKS SINGLE FETUS: CPT

## 2025-04-01 PROCEDURE — 99284 EMERGENCY DEPT VISIT MOD MDM: CPT

## 2025-04-01 PROCEDURE — 99285 EMERGENCY DEPT VISIT HI MDM: CPT | Performed by: EMERGENCY MEDICINE

## 2025-04-01 RX ORDER — LIDOCAINE HYDROCHLORIDE 20 MG/ML
1 JELLY TOPICAL ONCE
Status: COMPLETED | OUTPATIENT
Start: 2025-04-01 | End: 2025-04-01

## 2025-04-01 RX ADMIN — LIDOCAINE HYDROCHLORIDE 1 APPLICATION: 20 JELLY TOPICAL at 05:34

## 2025-04-01 NOTE — DISCHARGE INSTRUCTIONS
Patient Education     Franks Catheter   About this topic   Franks catheter is a thin, flexible tube that drains urine from your bladder. The catheter joins your bladder to a special bag outside of your body. The bag holds the urine until you are able to drain the bag. You may need to have a catheter for a short time. You may need a catheter after you are sick or have had surgery. Sometimes, a catheter is used for a long time.       Why is this procedure done?   A Franks catheter can be used for a number of health problems:  There is something blocking the urine from coming out. The blockage might be a narrow part of the urethra or prostate.  You are not able to fully drain your bladder which is urinary retention.  You need to stay in bed because of a serious injury, illness, or surgery.  You had surgery on your urethra, bladder, or prostate and they need to heal.  What happens during the procedure?   You will be asked to lie on your back in bed.  The nurse will clean your genital area with a special soap. The soap will help prevent an infection.  The tip of the catheter will be covered with lubrication jelly. The nurse will place the catheter in the opening where urine comes out, which is your urethra. The catheter will be gently pushed in until urine comes out.  There is a small balloon at the end of the catheter that is in your bladder. The nurse fills the balloon with a little bit of sterile water to help hold the catheter in place.  The catheter will be attached to a drain bag and secured to your body with tape or a special strap. The bag may be strapped to your leg or may hang on the frame of your bed.  The procedure takes about 10 minutes.  What happens after the procedure?   The catheter will stay in place as long as needed to drain your urine.  If you need a catheter for a long time, it will need to be exchanged every few weeks to prevent infection.  The area around the catheter will need to be cleaned with  care each day with soap and water. Cleaning may help prevent germs from getting into your bladder.  The urine will be emptied from the drainage bag as needed.  What drugs may be needed?   The doctor may order drugs to:  Treat an infection  Relax the tubes inside that drain the urine into your bladder  Ease bladder spasms  What problems could happen?   The catheter has a balloon to stay inside the bladder. The balloon can break or leak and the catheter can fall out.  Urine flow stops or is blocked by kinks or bends in the tube, or if the drain bag is kept higher than your bladder  You may see blood in the collecting tube or bag.  Bladder infection  You may have pain if the tube is pulled hard by mistake.  Over time, the catheter balloon can make your urethra larger and make you leak urine.  Last Reviewed Date   2021-03-30  Consumer Information Use and Disclaimer   This generalized information is a limited summary of diagnosis, treatment, and/or medication information. It is not meant to be comprehensive and should be used as a tool to help the user understand and/or assess potential diagnostic and treatment options. It does NOT include all information about conditions, treatments, medications, side effects, or risks that may apply to a specific patient. It is not intended to be medical advice or a substitute for the medical advice, diagnosis, or treatment of a health care provider based on the health care provider's examination and assessment of a patient’s specific and unique circumstances. Patients must speak with a health care provider for complete information about their health, medical questions, and treatment options, including any risks or benefits regarding use of medications. This information does not endorse any treatments or medications as safe, effective, or approved for treating a specific patient. [employer] and its affiliates disclaim any warranty or liability relating to this information or the  use thereof. The use of this information is governed by the Terms of Use, available at https://www.wolterskluwer.com/en/know/clinical-effectiveness-terms   Copyright   Copyright © 2024 [employer], [employer] and its affiliates and/or licensors. All rights reserved.

## 2025-04-01 NOTE — ED ATTENDING ATTESTATION
4/1/2025  I, Clifford Hdz MD, saw and evaluated the patient. I have discussed the patient with the resident/non-physician practitioner and agree with the resident's/non-physician practitioner's findings, Plan of Care, and MDM as documented in the resident's/non-physician practitioner's note, except where noted. All available labs and Radiology studies were reviewed.  I was present for key portions of any procedure(s) performed by the resident/non-physician practitioner and I was immediately available to provide assistance.       At this point I agree with the current assessment done in the Emergency Department.  I have conducted an independent evaluation of this patient a history and physical is as follows:    39-year-old female approximately 9 weeks gestation presented for evaluation of acute urinary retention.  She had been seen in the emergency department few days ago and found to be retaining urine, had Franks catheter placed which was subsequently removed in the OB/GYN office yesterday.  She stated that she still felt she was retaining throughout the day yesterday but was able to urinate.  Today when she woke up she was unable to pass any urine and feels uncomfortable.  Bladder scan in the room notable for well over 100 mL of urine.  Franks catheter was placed without difficulty producing about 400 mL immediately upon insertion with relief of her symptoms.    She had a history of uterine incarceration during previous pregnancy which was suspected to have caused her urinary retention previously.  Will check formal pelvic ultrasound to rule out any notable uterine abnormalities at this time.      ED Course  ED Course as of 04/01/25 0740   Tue Apr 01, 2025   0713 UA unremarkable.     Signed out to day team pending ultrasound and final disposition.  Likely discharge with outpatient OB/GYN follow-up if no significant findings on the ultrasound.    Critical Care Time  Procedures

## 2025-04-01 NOTE — ED NOTES
Standard drainage bag changed to leg bag per pt request. Supplies provided for home use. Pt verbalizes comprehension of catheter care.      Ngozi Wei RN  04/01/25 0901

## 2025-04-01 NOTE — ED PROVIDER NOTES
Emergency Department Sign Out Note        Sign out and transfer of care from Dr. Moe. See Separate Emergency Department note.     The patient, Priya Johnston, was evaluated by the previous provider for urinary retention.    Workup Completed:  Urinalysis    ED Course / Workup Pending (followup):  Patient 9 weeks pregnant  Previously from uterine incarceration  Duron catheter placed  Waiting on pelvic ultrasound  Follow-up with OB/GYN                                     Procedures  Medical Decision Making  Laboratory results unremarkable  Transvaginal ultrasound shows single intrauterine gestation measuring 8 weeks 6 days  No abnormal uterus or bladder  Patient cleared for discharge with Duron catheter and follow-up with OB/GYN  Patient agreeable to plan, strict return precautions given  Patient stable condition clear for discharge    Amount and/or Complexity of Data Reviewed  Labs: ordered.  Radiology: ordered.    Risk  Prescription drug management.            Disposition  Final diagnoses:   Urinary retention     Time reflects when diagnosis was documented in both MDM as applicable and the Disposition within this note       Time User Action Codes Description Comment    4/1/2025  7:09 AM Naresh Moe [R33.9] Urinary retention           ED Disposition       ED Disposition   Discharge    Condition   Stable    Date/Time   Tue Apr 1, 2025  8:44 AM    Comment   Priya Johnston discharge to home/self care.                   Follow-up Information       Follow up With Specialties Details Why Contact Info Additional Information    Your OB/Gyn  Schedule an appointment as soon as possible for a visit in 3 days To check duron catheter and evaluate for urinary retention       [facility] Emergency Department Emergency Medicine Go to  If symptoms worsen [address] [facility] [address]  [phone] [facility] Emergency Department, [address]  [place], [address]          Patient's Medications   Discharge Prescriptions    No medications on file     No discharge procedures on file.       ED Provider  Electronically Signed by     Flavio Sharpe DO  04/01/25 0910

## 2025-04-01 NOTE — ED PROVIDER NOTES
Time reflects when diagnosis was documented in both MDM as applicable and the Disposition within this note       Time User Action Codes Description Comment    4/1/2025  7:09 AM Naresh Moe Add [R33.9] Urinary retention           ED Disposition       None          Assessment & Plan       Medical Decision Making  Priya Johnston is a 39 y.o. female who is 9 weeks pregnant being evaluated for urinary retention    Differential diagnoses include but not limited to: Urinary retention, uterine incarceration, urinary tract infection, pyelonephritis, kidney stone    Initial workup to include: Franks catheter placement    See ED Course for data/imaging interpretation and further MDM.    Disposition: Prior history of uterine incarceration with first pregnancy causing similar urinary retention which resolved following Franks placement and removal after 4 days.  Similar symptoms during this ED visit, 400 cc of urine drained following Franks catheter placement with significant relief of discomfort.  Pending pelvic ultrasound. Patient to follow-up with OB as soon as possible for reevaluation if no acute findings on ultrasound in ED.  Patient signed out to Dr. Parish.      Amount and/or Complexity of Data Reviewed  Labs: ordered. Decision-making details documented in ED Course.  Radiology: ordered.    Risk  Prescription drug management.        ED Course as of 04/01/25 0743   Tue Apr 01, 2025   0607 400 cc of urine drained following Franks catheter placement   0642 Patient reports significant relief of discomfort following Franks placement, pending pelvic ultrasound and UA   0658 UA w Reflex to Microscopic w Reflex to Culture  Negative leukocytes and nitrites, low suspicion for urinary tract infection at this time       Medications   lidocaine (URO-JET) 2 % urethral/mucosal gel 1 Application (1 Application Urethral Given 4/1/25 0534)       ED Risk Strat Scores                            SBIRT 22yo+      Flowsheet Row Most Recent Value    Initial Alcohol Screen: US AUDIT-C     1. How often do you have a drink containing alcohol? 0 Filed at: 2025   2. How many drinks containing alcohol do you have on a typical day you are drinking?  0 Filed at: 2025   3b. FEMALE Any Age, or MALE 65+: How often do you have 4 or more drinks on one occassion? 0 Filed at: 2025   Audit-C Score 0 Filed at: 2025   MICHELLE: How many times in the past year have you...    Used an illegal drug or used a prescription medication for non-medical reasons? Never Filed at: 2025                            History of Present Illness       Chief Complaint   Patient presents with    Urinary Retention     9 weeks preg, started with urinary retention Friday and had duron placed. OB took out yesterday at 9am. Experioencing similar sx again       Past Medical History:   Diagnosis Date    Cluster headache 2009    Headache, tension-type 2005    Migraine 2009    Preeclampsia, severe 2024     (spontaneous vaginal delivery) 2024    Varicella     disease      Past Surgical History:   Procedure Laterality Date    WISDOM TOOTH EXTRACTION N/A       Family History   Problem Relation Age of Onset    Osteoporosis Mother     Migraines Mother     Asthma Mother     Diabetes Mother     Cancer Father         prostate    Diabetes Father     Dementia Maternal Grandmother     Migraines Maternal Grandmother     No Known Problems Half-Brother     No Known Problems Half-Sister       Social History     Tobacco Use    Smoking status: Never    Smokeless tobacco: Never   Vaping Use    Vaping status: Never Used   Substance Use Topics    Alcohol use: Not Currently     Alcohol/week: 2.0 standard drinks of alcohol     Types: 2 Cans of beer per week     Comment: socially    Drug use: Not Currently     Comment: nothing within the past 2 years (THC in past)      E-Cigarette/Vaping    E-Cigarette Use Never User        E-Cigarette/Vaping Substances    Nicotine No     THC No     CBD No     Flavoring No     Other No     Unknown No       I have reviewed and agree with the history as documented.     Priya Johnston is a 39 y.o. female who is 9 weeks pregnant being evaluated for urinary retention. She reports prior history of similar symptoms during first pregnancy and was found to have retroverted uterine incarceration which was likely etiology of bladder obstruction.  No surgeries or procedure performed to reduce uterus at that time, Franks catheter was placed and removed within 4 days with resolution of symptoms. Patient seeking evaluation in ED today for several days of urinary retention which began on 3/28/2025.  Patient was evaluated in ED and had Franks catheter placed with relief of symptoms.  Followed with OB 1 day ago who removed the Franks catheter.  Patient awoke this morning with urge to urinate and was unable to do so.  She denies any systemic fevers or chills.  No abdominal pain, nausea, vomiting, diarrhea, hematuria.                  Review of Systems   Genitourinary:  Positive for difficulty urinating.   All other systems reviewed and are negative.          Objective       ED Triage Vitals   Temperature Pulse Blood Pressure Respirations SpO2 Patient Position - Orthostatic VS   04/01/25 0508 04/01/25 0507 04/01/25 0507 04/01/25 0507 04/01/25 0507 04/01/25 0507   98.4 °F (36.9 °C) 99 133/79 16 98 % Sitting      Temp Source Heart Rate Source BP Location FiO2 (%) Pain Score    04/01/25 0508 04/01/25 0507 04/01/25 0507 -- 04/01/25 0507    Oral Monitor Right arm  7      Vitals      Date and Time Temp Pulse SpO2 Resp BP Pain Score FACES Pain Rating User   04/01/25 0508 98.4 °F (36.9 °C) -- -- -- -- -- -- DB   04/01/25 0507 -- 99 98 % 16 133/79 7 -- DB            Physical Exam  Constitutional:       Appearance: Normal appearance.   Eyes:      Extraocular Movements: Extraocular movements intact.      Conjunctiva/sclera:  Conjunctivae normal.      Pupils: Pupils are equal, round, and reactive to light.   Cardiovascular:      Rate and Rhythm: Normal rate and regular rhythm.      Pulses: Normal pulses.      Heart sounds: Normal heart sounds. No murmur heard.     No friction rub. No gallop.   Pulmonary:      Effort: Pulmonary effort is normal. No respiratory distress.      Breath sounds: Normal breath sounds. No stridor. No wheezing, rhonchi or rales.   Abdominal:      General: Abdomen is flat. Bowel sounds are normal. There is no distension.      Palpations: Abdomen is soft.      Tenderness: There is abdominal tenderness in the suprapubic area. There is no right CVA tenderness, left CVA tenderness, guarding or rebound.   Skin:     General: Skin is warm and dry.      Capillary Refill: Capillary refill takes less than 2 seconds.   Neurological:      General: No focal deficit present.      Mental Status: She is alert and oriented to person, place, and time. Mental status is at baseline.         Results Reviewed       Procedure Component Value Units Date/Time    UA w Reflex to Microscopic w Reflex to Culture [[id number]] Collected: 04/01/25 0639    Lab Status: Final result Specimen: Urine, Indwelling Franks Catheter Updated: 04/01/25 0654     Color, UA Yellow     Clarity, UA Clear     Specific Gravity, UA 1.015     pH, UA 6.5     Leukocytes, UA Negative     Nitrite, UA Negative     Protein, UA Negative mg/dl      Glucose, UA Negative mg/dl      Ketones, UA Negative mg/dl      Urobilinogen, UA <2.0 mg/dl      Bilirubin, UA Negative     Occult Blood, UA Negative     URINE COMMENT --    Urine culture [[id number]] Collected: 04/01/25 0639    Lab Status: In process Specimen: Urine, Indwelling Franks Catheter Updated: 04/01/25 0654            US OB < 14 weeks with transvaginal    (Results Pending)       POC Pelvic US    Date/Time: 4/1/2025 6:14 AM    Performed by: Naresh Moe DO  Authorized by: Naresh Moe DO    Patient location:  ED  Procedure  details:     Exam Type:  Educational    Indications: evaluate for IUP      Assessment for: evaluate fetal viability      Views obtained: uterus (transverse and sagittal)      Image quality: limited diagnostic      Image availability:  Images available in PACS  Interpretation:     Ultrasound impressions: normal      Pregnancy findings: intrauterine pregnancy (IUP)        ED Medication and Procedure Management   Prior to Admission Medications   Prescriptions Last Dose Informant Patient Reported? Taking?   Jencycla 0.35 MG tablet   No No   Sig: TAKE 1 TABLET BY MOUTH EVERY DAY   Prenatal Vit-Fe Fumarate-FA (PRENATAL PO)  Self Yes No   Sig: Take by mouth   Riboflavin (VITAMIN B-2 PO)  Self Yes No   Sig: Take by mouth   SUMAtriptan (IMITREX) 100 mg tablet   No No   Sig: Take 1 tablet (100 mg total) by mouth once as needed for migraine May repeat x1 in 2 hours, max 2/24 hours, 9/month   Patient not taking: Reported on 3/22/2025   magnesium 30 MG tablet  Self Yes No   Sig: Take 30 mg by mouth 2 (two) times a day   naproxen (EC NAPROSYN) 500 MG EC tablet   No No   Sig: Take 1 tablet (500 mg total) by mouth daily as needed (migraine)   Patient not taking: Reported on 3/22/2025   propranolol (INDERAL LA) 60 mg 24 hr capsule   No No   Sig: TAKE 1 CAPSULE (60 MG TOTAL) BY MOUTH DAILY AT BEDTIME   Patient not taking: Reported on 3/22/2025   sertraline (ZOLOFT) 50 mg tablet   No No   Sig: TAKE 1 TABLET BY MOUTH EVERY DAY      Facility-Administered Medications: None     Patient's Medications   Discharge Prescriptions    No medications on file     No discharge procedures on file.  ED SEPSIS DOCUMENTATION   Time reflects when diagnosis was documented in both MDM as applicable and the Disposition within this note       Time User Action Codes Description Comment    4/1/2025  7:09 AM Naresh Moe [R33.9] Urinary retention                  Naresh Moe DO  04/01/25 0743

## 2025-04-02 LAB — BACTERIA UR CULT: NORMAL

## 2025-04-04 ENCOUNTER — ROUTINE PRENATAL (OUTPATIENT)
Dept: OBGYN CLINIC | Facility: CLINIC | Age: 40
End: 2025-04-04

## 2025-04-04 VITALS — DIASTOLIC BLOOD PRESSURE: 68 MMHG | WEIGHT: 204 LBS | SYSTOLIC BLOOD PRESSURE: 118 MMHG | BODY MASS INDEX: 30.13 KG/M2

## 2025-04-04 DIAGNOSIS — R33.9 URINARY RETENTION: ICD-10-CM

## 2025-04-04 DIAGNOSIS — M62.89 PELVIC FLOOR DYSFUNCTION: ICD-10-CM

## 2025-04-04 DIAGNOSIS — Z3A.09 9 WEEKS GESTATION OF PREGNANCY: Primary | ICD-10-CM

## 2025-04-04 NOTE — PROGRESS NOTES
39 y.o.  female at 9w4d (Estimated Date of Delivery: 11/3/25) for PNV.    Pre- Vitals      Flowsheet Row Most Recent Value   Prenatal Assessment    Fetal Heart Rate present   Prenatal Vitals    Blood Pressure 118/68   Weight - Scale 92.5 kg (204 lb)   Urine Albumin/Glucose    Dilation/Effacement/Station    Cervical Dilation 0   Vaginal Drainage    Edema           TWG: Not found.    Leakage of fluid: no  Vaginal bleeding: no  Contractions/Cramping: pelvic pain/pressure  Fetal movement: no      Franks removed. Discussed time voiding at night. Both nights she ended up in ED, she had not voided in 4 hours.     Examined in hands/knees position and then supine. Pressure placed on uterus to elevate from pelvis. Patient with some cramping after exam.     Area of parameters to return to ED. Recommend pelvic floor PT.     RTO  4/10 as scheduled.

## 2025-04-04 NOTE — PATIENT INSTRUCTIONS
[email or web address]    Physical Therapy at [facility]’s team of trained female therapists offer a wide variety of services designed to address the special healthcare needs of women. Our specially trained clinicians work with you to address your concerns and come beside you to support and encourage you through the healing process. Our offices are designed to keep your sensitive treatments private at all times. We are here to ensure your comfort and mentor you through our pelvic floor therapy programs at your pace.    Our female team of experts treat the following conditions faced by women:      Urinary or bowel incontinence  Urinary urgency or frequency  Painful [place]  Painful Bladder Syndrome  Overactive Bladder  Constipation  Pelvic Girdle Pain             Sacroiliac Dysfunction   Gynecological Cancers    Pregnancy & Postpartum related discomfort  Buttock/Tailbone Pain                       Lumbar/Thoracic Pain  Hip Abnormalities/Pain    Scar Adhesions  Diastasis Recti  Osteoporosis          /Episiotomy Recovery  Vulvodynia  Pelvic Pain

## 2025-04-09 NOTE — PATIENT INSTRUCTIONS
Congratulations!! Please review our Pregnancy Essential Guide and [facility] L&D Virtual tour from our networks website.     [facility] Pregnancy Essentials Guide  [facility] ([email or web address])     [facility] - Virtual Tour ([employer])

## 2025-04-10 ENCOUNTER — INITIAL PRENATAL (OUTPATIENT)
Dept: OBGYN CLINIC | Facility: CLINIC | Age: 40
End: 2025-04-10

## 2025-04-10 VITALS
WEIGHT: 202 LBS | BODY MASS INDEX: 29.92 KG/M2 | HEIGHT: 69 IN | DIASTOLIC BLOOD PRESSURE: 70 MMHG | SYSTOLIC BLOOD PRESSURE: 110 MMHG

## 2025-04-10 DIAGNOSIS — Z36.9 ENCOUNTER FOR ANTENATAL SCREENING: ICD-10-CM

## 2025-04-10 DIAGNOSIS — Z83.3 FAMILY HISTORY OF TYPE 2 DIABETES MELLITUS: ICD-10-CM

## 2025-04-10 DIAGNOSIS — Z87.59 HISTORY OF PRE-ECLAMPSIA: ICD-10-CM

## 2025-04-10 DIAGNOSIS — Z34.81 PRENATAL CARE, SUBSEQUENT PREGNANCY, FIRST TRIMESTER: Primary | ICD-10-CM

## 2025-04-10 DIAGNOSIS — Z31.430 ENCOUNTER OF FEMALE FOR TESTING FOR GENETIC DISEASE CARRIER STATUS FOR PROCREATIVE MANAGEMENT: ICD-10-CM

## 2025-04-10 PROCEDURE — OBC

## 2025-04-10 NOTE — PROGRESS NOTES
OB INTAKE INTERVIEW  Patient is 39 y.o. who presents for OB intake at 10 3/7 wks  She is accompanied by herself during this encounter  The father of her baby (Venancio Johnston) is involved in the pregnancy and is 42 years old.      Last Menstrual Period: 25  Ultrasound: Measured 8 weeks 2 days on 3/28  Estimated Date of Delivery: 11/3/25 confirmed by 8 week US    Signs/Symptoms of Pregnancy  Current pregnancy symptoms: Nausea and vomiting  Constipation YES-sometimes  Headaches YES  Cramping/spotting no  PICA cravings no    Diabetes-  There is no height or weight on file to calculate BMI.  If patient has 1 or more, please order early 1 hour GTT  History of GDM no  BMI >35 no  History of PCOS or current metformin use (should stop for 7 days prior to 1hr GTT unless pre-existing diabetes)  no  History of LGA/macrosomic infant (4000g/9lbs) no    If patient has 2 or more, please order early 1 hour GTT  BMI>30 no  AMA YES  First degree relative with type 2 diabetes YES- Patient Father with T2DM  History of chronic HTN, hyperlipidemia, elevated A1C no  High risk race (, , ,  or ) no    Hypertension- if you answer yes to any of the following, please order baseline preeclampsia labs (cbc, comprehensive metabolic panel, urine protein creatinine ratio, uric acid)  History of of chronic HTN no  History of gestational HTN no  History of preeclampsia, eclampsia, or HELLP syndrome YES-Pre-E  History of diabetes no  History of lupus,sjogrens syndrome, kidney disease no    Thyroid- if yes order TSH with reflex T4  History of thyroid disease no    Bleeding Disorder or Hx of DVT-patient or first degree relative with history of. Order the following if not done previously.   (Factor V, antithrombin III, prothrombin gene mutation, protein C and S Ag, lupus anticoagulant, anticardiolipin, beta-2 glycoprotein)   no    OB/GYN-  History of abnormal pap smear no       Date of  last pap smear 1/10/24  History of HPV no  History of Herpes/HSV no  History of other STI (gonorrhea, chlamydia, trich) no  History of prior  YES  History of prior  no  History of  delivery prior to 36 weeks 6 days no  History of Varicella or Vaccination Had Varicella  History of blood transfusion no  Ok for blood transfusion YES    Substance screening-   History of tobacco use no  Currently using tobacco no  Substance Use Screen Level (N/A, LOW, HIGH) Low    MRSA Screening-   Does the pt have a hx of MRSA? no    Immunizations:  Influenza vaccine given this season YES  Discussed Tdap vaccine YES  Discussed COVID Vaccine YES    Genetic/[facility]-  Do you or your partner have a history of any of the following in yourselves or first degree relatives?  Cystic fibrosis no  Spinal muscular atrophy no  Hemoglobinopathy/Sickle Cell/Thalassemia no  Fragile X Intellectual Disability no    If no, discuss Carrier Screening being completed once in a lifetime as a standard of care lab test. Place orders for Cystic Fibrosis Gene Test (OYL877) and Spinal Muscular Atrophy DNA ([id number])      Appointment for Nuchal Translucency Ultrasound at [facility] scheduled for       Interview education  [facility] Pregnancy Essentials Book reviewed, discussed and attached to their AVS Yes    Nurse/Family Partnership- patient may qualify No; referral placed No    Prenatal lab work scripts Yes  Extra labs ordered:  SMA and CF screening  1 hr GTT  Pre-E labs    Aspirin/Preeclampsia Screen    Risk Level Risk Factor Recommendation   LOW Prior Uncomplicated full-term delivery YES No Aspirin recommendation        MODERATE Nulliparity no Recommend low-dose aspirin if     BMI>30 no 2 or more moderate risk factors    Family History Preeclampsia (mother/sister) no     35yr old or greater YES     Black Race, Concern for SDOH/Low Socioeconomic no     IVF Pregnancy  no     Personal History Risks (low birth weight, prior adverse preg outcome, >10yr  preg interval) no         HIGH History of Preeclampsia YES Recommend low-dose aspirin if     Multifetal gestation no 1 or more high risk factors    Chronic HTN no     Type 1 or 2 Diabetes no     Renal Disease no     Autoimmune Disease  no      Contraindications to ASA therapy:  NSAID/ ASA allergy: no  Nasal polyps: no  Asthma with history of ASA induced bronchospasm: no  Relative contraindications:  History of GI bleed: no  Active peptic ulcer disease: no  Severe hepatic dysfunction: no    Patient should be recommended to take ASA 162mg during this pregnancy from 12-36wks to lower her risk of preeclampsia: ASA Therapy Discussed.          The patient has a history now or in prior pregnancy notable for:  pre-clampsia-2024. Incarcerated uterus, Migraines, Anxiety EPDS-7      Details that I feel the provider should be aware of: This is an unplanned however welcomed pregnancy for parents. Patient is experiencing some nausea and vomiting. Patient has not taken anything for it. OTC medications and diet were discussed. Patient with Pre-E in her first pregnancy, was on a BP med for a month after delivery and then discontinued med. Patient BP today 110/ 70. Pre-E labs ordered. Patient's states she has an incarcerated Uterus and had several urethral catheters placed in last pregnancy due to inability to void. Patient just had a recent urethral catheter removed a week ago after catheter being in for 8 days due to inability to void. Patient feels fine today and able to void. Patient has a scheduled P.T. appointment for 4/15 for pelvic floor therapy. Patient currently takes Zoloft for Anxiety and sees her therapist Monthly. 1hr GTT ordered per screen.Patient knows to call OB for symptoms and how to contact her nurse navigator. Patient was made aware to have lab orders completed before next appointment. Patient denies any questions at this point and verbalizes understanding.         PN1 visit scheduled. The patient was oriented to  our practice, the navigator role, reviewed delivering physicians and [facility] for Delivery. All questions were answered.    Interviewed by: Ava Lew RN

## 2025-04-11 ENCOUNTER — TELEPHONE (OUTPATIENT)
Age: 40
End: 2025-04-11

## 2025-04-11 NOTE — TELEPHONE ENCOUNTER
Lorie from  Physical Therapy is requesting an insurance referral for the following specialty:      States an inbasket was sent on 4/9 - nothing seen    Test Name / Order Name:   Consult and treat Physical Therapy    DX Code: R39.198, Z3A.08    Date Of Service: 4/15    Location/Facility Name/Address/Phone #:   [facility]  Physical therapy    Location / Facility NPI: [id number]    Best Phone # To Reach The Patient:  [phone]    Please call Lorie after completed at [phone]

## 2025-04-15 ENCOUNTER — EVALUATION (OUTPATIENT)
Dept: PHYSICAL THERAPY | Facility: REHABILITATION | Age: 40
End: 2025-04-15
Payer: COMMERCIAL

## 2025-04-15 DIAGNOSIS — M62.89 PELVIC FLOOR TENSION: ICD-10-CM

## 2025-04-15 DIAGNOSIS — Z3A.08 8 WEEKS GESTATION OF PREGNANCY: ICD-10-CM

## 2025-04-15 DIAGNOSIS — R39.15 URINARY URGENCY: Primary | ICD-10-CM

## 2025-04-15 DIAGNOSIS — R39.198 DIFFICULTY URINATING: ICD-10-CM

## 2025-04-15 PROCEDURE — 97162 PT EVAL MOD COMPLEX 30 MIN: CPT | Performed by: PHYSICAL THERAPIST

## 2025-04-15 PROCEDURE — 97530 THERAPEUTIC ACTIVITIES: CPT | Performed by: PHYSICAL THERAPIST

## 2025-04-15 NOTE — TELEPHONE ENCOUNTER
Lorie from PT called back to check status as pt appt is today.    Spoke to Marcia and manager is working on insurance referral and will fax over.

## 2025-04-15 NOTE — PROGRESS NOTES
PT Evaluation     Today's date: 4/15/2025  Patient name: Priya Johnston  : 1985  MRN: [id number]  Referring provider: Shavon Hall, *  Dx:   Encounter Diagnosis     ICD-10-CM    1. Urinary urgency  R39.15       2. Difficulty urinating  R39.198 Ambulatory Referral to Physical Therapy      3. 8 weeks gestation of pregnancy  Z3A.08 Ambulatory Referral to Physical Therapy      4. Pelvic floor tension  M62.89                      Assessment  Impairments: abnormal coordination, abnormal muscle firing, abnormal muscle tone, abnormal or restricted ROM, activity intolerance, impaired physical strength and lacks appropriate home exercise program  Symptom irritability: moderate    Assessment details: Priya Johnston is a 39 y.o. female who presents with concerns of recent onset of urinary urgency and frequency as well as sensation of incomplete bladder emptying. She is currently pregnant with an encapsulated uterus however she also presents with high tone through her pelvic floor which may be contributing to her urinary symptoms. Examination reveals weak pelvic floor strength, decreased holding endurance and poor ability to volitionally relax. She is likely in a hypertonic muscular pattern which leads to weakness, fatigue and bladder symptoms.       The plan of care was discussed and included education regarding pelvic floor anatomy, explanation of exam technique, explanation of exam findings and discussion of treatment plan as well as the importance of patient compliance and adherence to physical therapy visits. Patient would benefit from skilled physical therapy services  to address deficits and ultimately meet goal of independent self management of condition.     Patient provided written and verbal consent for pelvic floor muscle exam: yes        Understanding of Dx/Px/POC: good     Prognosis: good    Goals  STGs to be met in 4 weeks:  * Patient will be compliant with introductory HEP as prescribed.  *  Presents with ability to volitionally relax her pelvic floor.    LTGs to be met by discharge:  * Patient reports that she is able to successfully suppress an urge to empty her bladder for 20' without leakage.   * Normalize sEMG findings to indicate strength average > 12uV and resting average < 2.5uV.   * Presents with improved fluid intake to avoid concentrated and irritating urine by discharge.  * Presents with improved nocturia to 2 toiletings/night for more thorough sleep.   * Patient implements urge suppression strategies throughout the day and reports that her average voiding interval is 2+ hours upon discharge.   * Patient will report that urinary urgency reduces by 75%.   * Patient will be compliant with comprehensive home exercise program for self management of condition.         Plan  Patient would benefit from: skilled physical therapy  Referral necessary: No  Planned modality interventions: biofeedback    Planned therapy interventions: manual therapy, neuromuscular re-education, patient/caregiver education, strengthening, therapeutic activities, therapeutic exercise and home exercise program    Frequency: 1x week  Duration in weeks: 16  Plan of Care beginning date: 4/15/2025  Plan of Care expiration date: 2025  Treatment plan discussed with: patient and PTA    PT Pelvic Floor Subjective:   History of Present Illness:   Patient is a  who is currently pregnant with RAMILA 11/3/25 who has chief concern of constant urinary urge and sensation of incomplete emptying. Was told during her first pregnancy that she has an incarcerated uterus. Currently experiencing nausea and vomitting, fatigue.     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[employer])    Life stress level: 9  Hand  dominance:  Ambidextrous  Diet and Exercise:      Enjoys walking but has not been recently due to fatigue level  OB/ gyn History    Gestational History:     Prior Pregnancy: Yes      Number of prior pregnancies: 3    Number of term pregnancies: 1    Delivery Type: vaginal delivery      Delivery Complications:  24 - son, Mak -vaginal delivery -induced at 37W due to pre-eclampsia, 6lb 3oz - slight tear with repair per patient report  Bladder Function:     Voiding Difficulties positive for: urgency, frequent urination and incomplete emptying       Voiding Difficulties comments:     Voiding frequency: every 1-2 hours    Urinary leakage: no urine leakage    Nocturia (episodes per night): 2 and 3    Painful urination: No      Intake (ounces): Water intake (oz): 80.     Intake (ounces) comment: Stopped breastfeeding at 5 months with her son. He is currently 9 1/2 months old.   Bowel Function:     Bowel frequency: daily and every 2 days    [place] Stool Scale: type 4  Sexual Function:     Sexually Active:  Sexually active  Pain:     No pain reported by patient.    Current pain ratin    At best pain ratin    At worst pain ratin  Diagnostic Tests:     Ultrasound: normal  Treatments:     None    Patient Goals:     Patient goals for therapy:  Improved quality of life, fully empty bladder or bowels and improved comfort      Objective       Abdominal Assessment:      Position: supine exam      General Perineum Exam:   perineum intact.     General perineum exam comments: No unusual discharge, irritation, organ prolapse or skin breakdown evident      Visual Inspection of Perineum:   Excursion of perineal body in cephalad direction with contraction of pelvic floor muscles (PFM): weak and fair   Excursion of perineal body in caudal direction with relaxation of pelvic floor muscles (PFM): weak  Involuntary contraction with coughing: yes  Cotton swab test: non-tender  Cough reflex: cough reflex  Sphincter Tone  Resting: normal  Sphincter Tone Squeeze: normal  Sensation: intact    Pelvic Organ Prolapse   no pelvic organ prolapse  Perineal body inspection: within normal limits        Pelvic Floor Muscle Exam:      Breathing pattern with contraction: within normal limits   Pelvic floor muscle relaxation is delayed and incomplete.    30 seconds required for complete relaxation.        PERFECT Score   Power right: 2/5   Power left: 2/5   Endurance (seconds to max): 5   Repetitions (before fatigue): 4         Pelvic exam completed: vaginally            Precautions:   Patient Active Problem List   Diagnosis    Migraine with aura and without status migrainosus, not intractable    History of pre-eclampsia           POC Expires Auth Status Start Date Exp Date PT Visit Limit DA expires DA provider   8/5               Weeks/Days Gestation 11/1           Blood Pressure            Date of Service 4/15           Visits Used            Visits Remaining                        Manuals            Gentle PFM release with cueing for release with breath  **                                              Neuro Re-Ed            DB + PFM                                                                                     Ther Ex            Aerobic warmup  TM          Physioball CW/CCW circes, hip tilts                                                            Ther Activity            Education Anatomy and POC                                                                        Modalities

## 2025-04-15 NOTE — TELEPHONE ENCOUNTER
Scanned referral into chart and LVM for SENA Melo to let her know that the referral has been completed.

## 2025-04-15 NOTE — TELEPHONE ENCOUNTER
Transaction ID: [id number] ID: [id number] Date: 2025-04-15  Priya Johnston Patient  Member ID  [id number]    Date of Birth  1985    Gender  Female    Eligibility Status  Active Coverage    Group Number  [id number]    Plan / Coverage Date  2025-01-01 - 2025-12-31    Transaction Type  Referral    Organization  [facility]    Payer  [facility] BLUE CROSS    [employer] logo     Certificate Information  Certification Number  [id number]    Status  CERTIFIED IN TOTAL

## 2025-04-15 NOTE — TELEPHONE ENCOUNTER
Informed referral is showing as pending. I will ask the  if she can check the status on her end and then will call Lorie back with an update.

## 2025-04-22 ENCOUNTER — APPOINTMENT (OUTPATIENT)
Dept: LAB | Facility: CLINIC | Age: 40
End: 2025-04-22
Attending: OBSTETRICS & GYNECOLOGY
Payer: COMMERCIAL

## 2025-04-22 DIAGNOSIS — Z36.9 ENCOUNTER FOR ANTENATAL SCREENING: ICD-10-CM

## 2025-04-22 DIAGNOSIS — Z83.3 FAMILY HISTORY OF TYPE 2 DIABETES MELLITUS: ICD-10-CM

## 2025-04-22 DIAGNOSIS — Z34.81 PRENATAL CARE, SUBSEQUENT PREGNANCY, FIRST TRIMESTER: ICD-10-CM

## 2025-04-22 DIAGNOSIS — E78.49 FAMILIAL HYPERLIPIDEMIA: ICD-10-CM

## 2025-04-22 DIAGNOSIS — Z87.59 HISTORY OF PRE-ECLAMPSIA: ICD-10-CM

## 2025-04-22 DIAGNOSIS — Z31.430 ENCOUNTER OF FEMALE FOR TESTING FOR GENETIC DISEASE CARRIER STATUS FOR PROCREATIVE MANAGEMENT: ICD-10-CM

## 2025-04-22 LAB
ABO GROUP BLD: NORMAL
ALBUMIN SERPL BCG-MCNC: 4.1 G/DL (ref 3.5–5)
ALP SERPL-CCNC: 88 U/L (ref 34–104)
ALT SERPL W P-5'-P-CCNC: 13 U/L (ref 7–52)
ANION GAP SERPL CALCULATED.3IONS-SCNC: 7 MMOL/L (ref 4–13)
AST SERPL W P-5'-P-CCNC: 11 U/L (ref 13–39)
BACTERIA UR QL AUTO: ABNORMAL /HPF
BASOPHILS # BLD AUTO: 0.02 THOUSANDS/ÂΜL (ref 0–0.1)
BASOPHILS NFR BLD AUTO: 0 % (ref 0–1)
BILIRUB SERPL-MCNC: 0.31 MG/DL (ref 0.2–1)
BILIRUB UR QL STRIP: NEGATIVE
BLD GP AB SCN SERPL QL: NEGATIVE
BUN SERPL-MCNC: 7 MG/DL (ref 5–25)
CALCIUM SERPL-MCNC: 9.2 MG/DL (ref 8.4–10.2)
CHLORIDE SERPL-SCNC: 106 MMOL/L (ref 96–108)
CLARITY UR: CLEAR
CO2 SERPL-SCNC: 24 MMOL/L (ref 21–32)
COLOR UR: YELLOW
CREAT SERPL-MCNC: 0.55 MG/DL (ref 0.6–1.3)
CREAT UR-MCNC: 154 MG/DL
EOSINOPHIL # BLD AUTO: 0.13 THOUSAND/ÂΜL (ref 0–0.61)
EOSINOPHIL NFR BLD AUTO: 2 % (ref 0–6)
ERYTHROCYTE [DISTWIDTH] IN BLOOD BY AUTOMATED COUNT: 12 % (ref 11.6–15.1)
GFR SERPL CREATININE-BSD FRML MDRD: 120 ML/MIN/1.73SQ M
GLUCOSE 1H P 50 G GLC PO SERPL-MCNC: 81 MG/DL (ref 70–134)
GLUCOSE SERPL-MCNC: 80 MG/DL (ref 65–140)
GLUCOSE UR STRIP-MCNC: NEGATIVE MG/DL
HCT VFR BLD AUTO: 40.1 % (ref 34.8–46.1)
HGB BLD-MCNC: 13.3 G/DL (ref 11.5–15.4)
HGB UR QL STRIP.AUTO: NEGATIVE
IMM GRANULOCYTES # BLD AUTO: 0.05 THOUSAND/UL (ref 0–0.2)
IMM GRANULOCYTES NFR BLD AUTO: 1 % (ref 0–2)
KETONES UR STRIP-MCNC: NEGATIVE MG/DL
LEUKOCYTE ESTERASE UR QL STRIP: ABNORMAL
LYMPHOCYTES # BLD AUTO: 1.69 THOUSANDS/ÂΜL (ref 0.6–4.47)
LYMPHOCYTES NFR BLD AUTO: 23 % (ref 14–44)
MCH RBC QN AUTO: 29 PG (ref 26.8–34.3)
MCHC RBC AUTO-ENTMCNC: 33.2 G/DL (ref 31.4–37.4)
MCV RBC AUTO: 87 FL (ref 82–98)
MONOCYTES # BLD AUTO: 0.66 THOUSAND/ÂΜL (ref 0.17–1.22)
MONOCYTES NFR BLD AUTO: 9 % (ref 4–12)
MUCOUS THREADS UR QL AUTO: ABNORMAL
NEUTROPHILS # BLD AUTO: 4.81 THOUSANDS/ÂΜL (ref 1.85–7.62)
NEUTS SEG NFR BLD AUTO: 65 % (ref 43–75)
NITRITE UR QL STRIP: NEGATIVE
NON-SQ EPI CELLS URNS QL MICRO: ABNORMAL /HPF
NRBC BLD AUTO-RTO: 0 /100 WBCS
PH UR STRIP.AUTO: 5.5 [PH]
PLATELET # BLD AUTO: 241 THOUSANDS/UL (ref 149–390)
PMV BLD AUTO: 9 FL (ref 8.9–12.7)
POTASSIUM SERPL-SCNC: 3.7 MMOL/L (ref 3.5–5.3)
PROT SERPL-MCNC: 6.8 G/DL (ref 6.4–8.4)
PROT UR STRIP-MCNC: NEGATIVE MG/DL
PROT UR-MCNC: 11.9 MG/DL
PROT/CREAT UR: 0.1 MG/G{CREAT}
RBC # BLD AUTO: 4.59 MILLION/UL (ref 3.81–5.12)
RBC #/AREA URNS AUTO: ABNORMAL /HPF
RH BLD: POSITIVE
RUBV IGG SERPL IA-ACNC: 10.5 IU/ML
SODIUM SERPL-SCNC: 137 MMOL/L (ref 135–147)
SP GR UR STRIP.AUTO: 1.02 (ref 1–1.03)
SPECIMEN EXPIRATION DATE: NORMAL
URATE SERPL-MCNC: 2.4 MG/DL (ref 2–7.5)
UROBILINOGEN UR STRIP-ACNC: <2 MG/DL
WBC # BLD AUTO: 7.36 THOUSAND/UL (ref 4.31–10.16)
WBC #/AREA URNS AUTO: ABNORMAL /HPF

## 2025-04-22 PROCEDURE — 36415 COLL VENOUS BLD VENIPUNCTURE: CPT

## 2025-04-22 PROCEDURE — 86762 RUBELLA ANTIBODY: CPT

## 2025-04-22 PROCEDURE — 86900 BLOOD TYPING SEROLOGIC ABO: CPT

## 2025-04-22 PROCEDURE — 85025 COMPLETE CBC W/AUTO DIFF WBC: CPT

## 2025-04-22 PROCEDURE — 82570 ASSAY OF URINE CREATININE: CPT

## 2025-04-22 PROCEDURE — 87340 HEPATITIS B SURFACE AG IA: CPT

## 2025-04-22 PROCEDURE — 82950 GLUCOSE TEST: CPT

## 2025-04-22 PROCEDURE — 87389 HIV-1 AG W/HIV-1&-2 AB AG IA: CPT

## 2025-04-22 PROCEDURE — 86803 HEPATITIS C AB TEST: CPT

## 2025-04-22 PROCEDURE — 86901 BLOOD TYPING SEROLOGIC RH(D): CPT

## 2025-04-22 PROCEDURE — 87077 CULTURE AEROBIC IDENTIFY: CPT

## 2025-04-22 PROCEDURE — 84550 ASSAY OF BLOOD/URIC ACID: CPT

## 2025-04-22 PROCEDURE — 86850 RBC ANTIBODY SCREEN: CPT

## 2025-04-22 PROCEDURE — 87086 URINE CULTURE/COLONY COUNT: CPT

## 2025-04-22 PROCEDURE — 86780 TREPONEMA PALLIDUM: CPT

## 2025-04-22 PROCEDURE — 87186 SC STD MICRODIL/AGAR DIL: CPT

## 2025-04-22 PROCEDURE — 80053 COMPREHEN METABOLIC PANEL: CPT

## 2025-04-22 PROCEDURE — 84156 ASSAY OF PROTEIN URINE: CPT

## 2025-04-22 PROCEDURE — 81001 URINALYSIS AUTO W/SCOPE: CPT

## 2025-04-23 LAB
HBV SURFACE AG SER QL: NORMAL
HCV AB SER QL: NORMAL
HIV 1+2 AB+HIV1 P24 AG SERPL QL IA: NORMAL
TREPONEMA PALLIDUM IGG+IGM AB [PRESENCE] IN SERUM OR PLASMA BY IMMUNOASSAY: NORMAL

## 2025-04-24 ENCOUNTER — RESULTS FOLLOW-UP (OUTPATIENT)
Dept: OBGYN CLINIC | Facility: CLINIC | Age: 40
End: 2025-04-24

## 2025-04-24 DIAGNOSIS — O23.40 URINARY TRACT INFECTION AFFECTING PREGNANCY: Primary | ICD-10-CM

## 2025-04-24 LAB
BACTERIA UR CULT: ABNORMAL
BACTERIA UR CULT: ABNORMAL

## 2025-04-25 ENCOUNTER — TELEPHONE (OUTPATIENT)
Dept: PHYSICAL THERAPY | Facility: REHABILITATION | Age: 40
End: 2025-04-25

## 2025-04-25 LAB
CITATION REF LAB TEST: NORMAL
CLINICAL INFO: NORMAL
ETHNIC BACKGROUND STATED: NORMAL
GENE DIS ANL CARRIER INTERP-IMP: NORMAL
GENE MUT TESTED BLD/T: NORMAL
LAB DIRECTOR NAME PROVIDER: NORMAL
REASON FOR REFERRAL (NARRATIVE): NORMAL
RECOMMENDATION PATIENT DOC-IMP: NORMAL
REF LAB TEST METHOD: NORMAL
SERVICE CMNT-IMP: NORMAL
SMN1 GENE MUT ANL BLD/T: NORMAL
SPECIMEN SOURCE: NORMAL

## 2025-04-25 RX ORDER — CEFUROXIME AXETIL 250 MG/1
250 TABLET ORAL EVERY 12 HOURS SCHEDULED
Qty: 14 TABLET | Refills: 0 | Status: SHIPPED | OUTPATIENT
Start: 2025-04-25 | End: 2025-05-02

## 2025-04-25 NOTE — TELEPHONE ENCOUNTER
Contacted due to missing her appointment at 230 on April 25, requested a call back to confirm her next follow up on 04/30.

## 2025-04-29 ENCOUNTER — ROUTINE PRENATAL (OUTPATIENT)
Facility: HOSPITAL | Age: 40
End: 2025-04-29
Payer: COMMERCIAL

## 2025-04-29 ENCOUNTER — APPOINTMENT (OUTPATIENT)
Dept: LAB | Facility: CLINIC | Age: 40
End: 2025-04-29
Attending: OBSTETRICS & GYNECOLOGY
Payer: COMMERCIAL

## 2025-04-29 ENCOUNTER — INITIAL PRENATAL (OUTPATIENT)
Dept: OBGYN CLINIC | Facility: CLINIC | Age: 40
End: 2025-04-29

## 2025-04-29 VITALS
OXYGEN SATURATION: 97 % | BODY MASS INDEX: 29.53 KG/M2 | WEIGHT: 199.4 LBS | DIASTOLIC BLOOD PRESSURE: 64 MMHG | SYSTOLIC BLOOD PRESSURE: 120 MMHG | HEIGHT: 69 IN | HEART RATE: 100 BPM

## 2025-04-29 VITALS — WEIGHT: 200 LBS | SYSTOLIC BLOOD PRESSURE: 120 MMHG | BODY MASS INDEX: 29.53 KG/M2 | DIASTOLIC BLOOD PRESSURE: 80 MMHG

## 2025-04-29 DIAGNOSIS — O09.521 SUPERVISION OF ELDERLY MULTIGRAVIDA IN FIRST TRIMESTER: ICD-10-CM

## 2025-04-29 DIAGNOSIS — Z36.9 ANTENATAL SCREENING ENCOUNTER: ICD-10-CM

## 2025-04-29 DIAGNOSIS — Z3A.13 13 WEEKS GESTATION OF PREGNANCY: Primary | ICD-10-CM

## 2025-04-29 DIAGNOSIS — Z36.82 ENCOUNTER FOR (NT) NUCHAL TRANSLUCENCY SCAN: ICD-10-CM

## 2025-04-29 DIAGNOSIS — Z32.01 PREGNANCY, CONFIRMED, NOT FIRST: ICD-10-CM

## 2025-04-29 DIAGNOSIS — O26.899 URINARY URGENCY DURING PREGNANCY: ICD-10-CM

## 2025-04-29 DIAGNOSIS — R51.9 PREGNANCY HEADACHE IN FIRST TRIMESTER: ICD-10-CM

## 2025-04-29 DIAGNOSIS — R39.15 URINARY URGENCY DURING PREGNANCY: ICD-10-CM

## 2025-04-29 DIAGNOSIS — O26.891 PREGNANCY HEADACHE IN FIRST TRIMESTER: ICD-10-CM

## 2025-04-29 PROBLEM — Z3A.12 12 WEEKS GESTATION OF PREGNANCY: Status: ACTIVE | Noted: 2025-04-29

## 2025-04-29 PROCEDURE — PNV: Performed by: PHYSICIAN ASSISTANT

## 2025-04-29 PROCEDURE — 99244 OFF/OP CNSLTJ NEW/EST MOD 40: CPT | Performed by: OBSTETRICS & GYNECOLOGY

## 2025-04-29 PROCEDURE — 87491 CHLMYD TRACH DNA AMP PROBE: CPT | Performed by: PHYSICIAN ASSISTANT

## 2025-04-29 PROCEDURE — 36415 COLL VENOUS BLD VENIPUNCTURE: CPT

## 2025-04-29 PROCEDURE — 76813 OB US NUCHAL MEAS 1 GEST: CPT | Performed by: OBSTETRICS & GYNECOLOGY

## 2025-04-29 PROCEDURE — 87591 N.GONORRHOEAE DNA AMP PROB: CPT | Performed by: PHYSICIAN ASSISTANT

## 2025-04-29 RX ORDER — BUTALBITAL, ACETAMINOPHEN AND CAFFEINE 300; 40; 50 MG/1; MG/1; MG/1
1 CAPSULE ORAL EVERY 12 HOURS
Qty: 30 CAPSULE | Refills: 1 | Status: SHIPPED | OUTPATIENT
Start: 2025-04-29

## 2025-04-29 RX ORDER — ASPIRIN 81 MG/1
81 TABLET, CHEWABLE ORAL DAILY
COMMUNITY

## 2025-04-29 NOTE — PROGRESS NOTES
Patient chose to have [facility] AyafphnV13 Non-Invasive Prenatal Screen [id number] PLUS w/ SCA, WITH fetal sex.  Patient choose to be billed through insurance.     Patient given brochure and is aware [facility] will contact patient's insurance and coordinate coverage.  Provided [facility] contact information. General inquiries [phone], Cost estimates [phone] and [facility] Billing [phone]. Website [email or web address].     Blood collection tubes labeled with patient identifiers (name, medical record number, and date of birth).     Filled out [facility] order form. Patient chose to be sent to an outpatient lab to complete blood work. .      If patient chose to have blood work drawn at a [facility] lab we requested patient notify MFM (via phone call or Datahug message) when blood collected so office can follow up on results.       Maternal Fetal Medicine will have results in approximately 5-7 business days and will call patient or notify via Datahug.  Patient aware viewing lab result online will reveal fetal sex if ordered.    Patient verbalized understanding of all instructions and no questions at this time.

## 2025-04-29 NOTE — PROGRESS NOTES
Patient chose to have [facility] VhzorveG80 Non-Invasive Prenatal Screen [id number] PLUS w/ SCA, WITH fetal sex.  Patient choose to be billed through insurance.     Patient given brochure and is aware [facility] will contact patient's insurance and coordinate coverage.  Provided [facility] contact information. General inquiries [phone], Cost estimates [phone] and [facility] Billing [phone]. Website [employer].     Blood collection tubes labeled with patient identifiers (name, medical record number, and date of birth).     Filled out [facility] order form. Patient chose to be sent to an outpatient lab to complete blood work. .      If patient chose to have blood work drawn at a [facility] lab we requested patient notify MFM (via phone call or Mobspire message) when blood collected so office can follow up on results.       Maternal Fetal Medicine will have results in approximately 5-7 business days and will call patient or notify via [employer].  Patient aware viewing lab result online will reveal fetal sex if ordered.    Patient verbalized understanding of all instructions and no questions at this time.

## 2025-04-29 NOTE — PROGRESS NOTES
The patient is here for a 13 week 1ST OB. She is due for Gc/chlamydia due.     No cramping or bleeding.     The patient has nausea daily. She has vomiting every few days. The patient has headaches that vary from mild to migraines. She has headaches a few times a week.   No dizziness.     Urine dip- protein neg, glucose neg.

## 2025-04-29 NOTE — PROGRESS NOTES
39 y.o.  female at 13w1d (Estimated Date of Delivery: 11/3/25) for PNV.    Pre-Shilo Vitals      Flowsheet Row Most Recent Value   Prenatal Assessment    Prenatal Vitals    Blood Pressure 120/80   Weight - Scale 90.7 kg (200 lb)   Urine Albumin/Glucose    Dilation/Effacement/Station    Vaginal Drainage    Draining Fluid No   Edema    LLE Edema None   RLE Edema None   Facial Edema None          TW kg (0 lb)    Leakage of fluid: no  Vaginal bleeding: no  Contractions/Cramping: no  Fetal movement: no  Pt is feeling well with the exception of daily HAs--Tylenol not helping  This was not an issue in her 1st pregnancy 1 year ago  Will restart Magnesium  Will take Rx for Fioricet  Cultures today  Rx afp    RTO in 4 weeks.

## 2025-04-29 NOTE — PROGRESS NOTES
"[facility]:  Priya Johnston was seen today at 13w1d for nuchal translucency ultrasound.  See ultrasound report under \"OB Procedures\" tab.      My recommendations are as follows:  We reviewed the availability of aneuploidy screening, as well as diagnostic testing, which are available to all pregnant women. We reviewed limitations, risks, and benefits of screening and testing. She elected to proceed with Non Invasive Prenatal Screening (NIPS). MSAFP screening should be ordered through your office at 15-20 weeks gestation, and completed prior to fetal anatomic survey. She does not wish to pursue diagnostic testing at this time.  A detailed anatomic survey as well as transvaginal cervical length screening are recommended between 20-22 weeks gestation.  Please offer carrier screening per ACOG guidelines.  They are not interested in carrier screening testing at this time.     Advanced Maternal Age (AMA) is defined as maternal age 35 or greater at EDC. Advanced maternal age is associated with an increased risk of several pregnancy outcomes, including aneuploidy/genetic syndromes, poor fetal growth, stillbirth, maternal hypertensive disorders, and gestational diabetes.  Risk of adverse outcomes is proportional to patient age. Despite these increased risks, many women of advanced maternal age have normal, healthy pregnancy outcomes, particularly if they have no co-existing medical conditions. Genetic counseling is available to further discuss genetic screening and testing options available in pregnancy. For women age 35 and older, we recommend an ultrasound at 30-32 weeks to assess fetal growth.     Priya has already initiated low-dose aspirin to reduce her recurrence risk for hypertensive complications.  She should continue this therapy until 36 weeks gestation.     We briefly discussed her ongoing issues with urinary urgency.  She is receiving treatment for urinary tract infection as well as " ongoing pelvic floor physical therapy.  There is no evidence of uterine incarceration by today's ultrasound.  We discussed that referral to urogynecology can be considered if symptoms are persistent.    Please don't hesitate to contact our office with any concerns or questions.    -Vanessa Nieves MD

## 2025-04-29 NOTE — LETTER
April 29, 2025     Shavon Hall PA-C  [address]   [address]  [place] [address]    Patient: Priya Johnston   YOB: 1985   Date of Visit: 4/29/2025       Dear  Shavon Hall PA-C:    Thank you for referring Priya Johnston to me for evaluation. Below are my notes for this consultation.    If you have questions, please do not hesitate to call me. I look forward to following your patient along with you.         Sincerely,        Vanessa Nieves MD        CC: No Recipients    Ines Hortonan  4/29/2025  9:55 AM  Sign when Signing Visit  Patient chose to have [facility] PhemebcB17 Non-Invasive Prenatal Screen [id number] PLUS w/ SCA, WITH fetal sex.  Patient choose to be billed through insurance.     Patient given brochure and is aware [facility] will contact patient's insurance and coordinate coverage.  Provided [facility] contact information. General inquiries [phone], Cost estimates [phone] and [facility] Billing [phone]. Website [email or web address].     Blood collection tubes labeled with patient identifiers (name, medical record number, and date of birth).     Filled out [facility] order form. Patient chose to be sent to an outpatient lab to complete blood work. .      If patient chose to have blood work drawn at a [facility] lab we requested patient notify MFM (via phone call or Med Access message) when blood collected so office can follow up on results.       Maternal Fetal Medicine will have results in approximately 5-7 business days and will call patient or notify via Med Access.  Patient aware viewing lab result online will reveal fetal sex if ordered.    Patient verbalized understanding of all instructions and no questions at this time.      Juliana Reyes  4/29/2025  9:50 AM  Sign when Signing Visit  Patient chose to have [facility] UodahiuY42 Non-Invasive Prenatal Screen [id number] PLUS w/ SCA, WITH fetal sex.  Patient choose to be billed  "through insurance.     Patient given brochure and is aware [facility] will contact patient's insurance and coordinate coverage.  Provided [facility] contact information. General inquiries [phone], Cost estimates [phone] and [facility] Billing [phone]. Website [employer].     Blood collection tubes labeled with patient identifiers (name, medical record number, and date of birth).     Filled out [facility] order form. Patient chose to be sent to an outpatient lab to complete blood work. .      If patient chose to have blood work drawn at a [facility] lab we requested patient notify MFM (via phone call or OPPRTUNITYhart message) when blood collected so office can follow up on results.       Maternal Fetal Medicine will have results in approximately 5-7 business days and will call patient or notify via [employer].  Patient aware viewing lab result online will reveal fetal sex if ordered.    Patient verbalized understanding of all instructions and no questions at this time.     Vanessa Nieves MD  2025 10:03 AM  Sign when Signing Visit  [facility]:  Priya Johnston was seen today at 13w1d for nuchal translucency ultrasound.  See ultrasound report under \"OB Procedures\" tab.      My recommendations are as follows:  We reviewed the availability of aneuploidy screening, as well as diagnostic testing, which are available to all pregnant women. We reviewed limitations, risks, and benefits of screening and testing. She elected to proceed with Non Invasive Prenatal Screening (NIPS). MSAFP screening should be ordered through your office at 15-20 weeks gestation, and completed prior to fetal anatomic survey. She does not wish to pursue diagnostic testing at this time.  A detailed anatomic survey as well as transvaginal cervical length screening are recommended between 20-22 weeks gestation.  Please offer carrier screening per ACOG guidelines.  They are not interested in " carrier screening testing at this time.     Advanced Maternal Age (AMA) is defined as maternal age 35 or greater at EDC. Advanced maternal age is associated with an increased risk of several pregnancy outcomes, including aneuploidy/genetic syndromes, poor fetal growth, stillbirth, maternal hypertensive disorders, and gestational diabetes.  Risk of adverse outcomes is proportional to patient age. Despite these increased risks, many women of advanced maternal age have normal, healthy pregnancy outcomes, particularly if they have no co-existing medical conditions. Genetic counseling is available to further discuss genetic screening and testing options available in pregnancy. For women age 35 and older, we recommend an ultrasound at 30-32 weeks to assess fetal growth.     Priya has already initiated low-dose aspirin to reduce her recurrence risk for hypertensive complications.  She should continue this therapy until 36 weeks gestation.     We briefly discussed her ongoing issues with urinary urgency.  She is receiving treatment for urinary tract infection as well as ongoing pelvic floor physical therapy.  There is no evidence of uterine incarceration by today's ultrasound.  We discussed that referral to urogynecology can be considered if symptoms are persistent.    Please don't hesitate to contact our office with any concerns or questions.    -Vanessa Nieves MD

## 2025-04-30 ENCOUNTER — APPOINTMENT (OUTPATIENT)
Dept: PHYSICAL THERAPY | Facility: REHABILITATION | Age: 40
End: 2025-04-30
Payer: COMMERCIAL

## 2025-04-30 ENCOUNTER — RESULTS FOLLOW-UP (OUTPATIENT)
Dept: OBGYN CLINIC | Facility: CLINIC | Age: 40
End: 2025-04-30

## 2025-04-30 LAB
C TRACH DNA SPEC QL NAA+PROBE: NEGATIVE
MISCELLANEOUS LAB TEST RESULT: NORMAL
N GONORRHOEA DNA SPEC QL NAA+PROBE: NEGATIVE

## 2025-05-03 LAB
CFDNA.FET/CFDNA.TOTAL SFR FETUS: NORMAL %
CFDNA.FET/CFDNA.TOTAL SFR FETUS: NORMAL %
CITATION REF LAB TEST: NORMAL
FET 13+18+21+X+Y ANEUP PLAS.CFDNA: NEGATIVE
FET CHR 21 TS PLAS.CFDNA QL: NEGATIVE
FET CHR 21 TS PLAS.CFDNA QL: NEGATIVE
FET MS X RISK WBC.DNA+CFDNA QL: NOT DETECTED
FET SEX PLAS.CFDNA DOSAGE CFDNA: NORMAL
FET TS 13 RISK PLAS.CFDNA QL: NEGATIVE
FET X + Y ANEUP RISK PLAS.CFDNA SEQ-IMP: NOT DETECTED
GA EST FROM CONCEPTION DATE: NORMAL D
GESTATIONAL AGE > 9:: YES
LAB DIRECTOR NAME PROVIDER: NORMAL
LABORATORY COMMENT REPORT: NORMAL
LIMITATIONS OF THE TEST: NORMAL
NEGATIVE PREDICTIVE VALUE: NORMAL
PERFORMANCE CHARACTERISTICS: NORMAL
POSITIVE PREDICTIVE VALUE: NORMAL
REF LAB TEST METHOD: NORMAL
SL AMB NOTE:: NORMAL
TEST PERFORMANCE INFO SPEC: NORMAL

## 2025-05-05 ENCOUNTER — RESULTS FOLLOW-UP (OUTPATIENT)
Dept: PERINATAL CARE | Facility: OTHER | Age: 40
End: 2025-05-05

## 2025-05-06 ENCOUNTER — OFFICE VISIT (OUTPATIENT)
Dept: PHYSICAL THERAPY | Facility: REHABILITATION | Age: 40
End: 2025-05-06
Payer: COMMERCIAL

## 2025-05-06 ENCOUNTER — RESULTS FOLLOW-UP (OUTPATIENT)
Dept: FAMILY MEDICINE CLINIC | Facility: CLINIC | Age: 40
End: 2025-05-06

## 2025-05-06 DIAGNOSIS — R39.198 DIFFICULTY URINATING: Primary | ICD-10-CM

## 2025-05-06 DIAGNOSIS — M62.89 PELVIC FLOOR TENSION: ICD-10-CM

## 2025-05-06 DIAGNOSIS — R39.15 URINARY URGENCY: ICD-10-CM

## 2025-05-06 PROCEDURE — 97110 THERAPEUTIC EXERCISES: CPT | Performed by: PHYSICAL THERAPIST

## 2025-05-06 PROCEDURE — 97140 MANUAL THERAPY 1/> REGIONS: CPT | Performed by: PHYSICAL THERAPIST

## 2025-05-06 NOTE — PROGRESS NOTES
Daily Note     Today's date: 2025  Patient name: Priya Johnston  : 1985  MRN: [id number]  Referring provider: Shavon Hall, *  Dx:   Encounter Diagnosis     ICD-10-CM    1. Difficulty urinating  R39.198       2. Urinary urgency  R39.15       3. Pelvic floor tension  M62.89                      Subjective: Reports no new c/o since IE. Urinary urgency continues to be constant and rates 5/10.       Objective: See treatment diary below      Assessment: Tolerated treatment well. Patient would benefit from continued PT. Warmup, TE and manual therapy all well tolerated. Advised to perform ball exercises, adductor stretches and cat/cow stretches at home. Expressed good understanding.       Plan: Continue per plan of care.      Precautions:   Patient Active Problem List   Diagnosis    Migraine with aura and without status migrainosus, not intractable    History of pre-eclampsia           POC Expires Auth Status Start Date Exp Date PT Visit Limit DA expires DA provider   8/5               Weeks/Days Gestation  14          Blood Pressure  118/78 L arm seated          Date of Service 4/15 5/6          Visits Used            Visits Remaining                        Manuals            Gentle PFM release with cueing for release with breath  15'          Hip stretches all planes  10'                                  Neuro Re-Ed            DB + PFM                                                                                     Ther Ex            Aerobic warmup  TM 2.9mph x8'          Physioball CW/CCW circes, hip tilts  BPB 1' each          Side lunge add stretch  Slider x10 each          Cat/cow  x15                                  Ther Activity            Education Anatomy and POC                                                                        Modalities

## 2025-05-10 ENCOUNTER — PATIENT MESSAGE (OUTPATIENT)
Dept: OBGYN CLINIC | Facility: CLINIC | Age: 40
End: 2025-05-10

## 2025-05-10 DIAGNOSIS — O21.9 NAUSEA AND VOMITING IN PREGNANCY: Primary | ICD-10-CM

## 2025-05-12 RX ORDER — METOCLOPRAMIDE 10 MG/1
10 TABLET ORAL 4 TIMES DAILY PRN
Qty: 30 TABLET | Refills: 1 | Status: SHIPPED | OUTPATIENT
Start: 2025-05-12

## 2025-05-12 RX ORDER — ONDANSETRON 4 MG/1
4 TABLET, FILM COATED ORAL EVERY 8 HOURS PRN
Qty: 20 TABLET | Refills: 1 | Status: SHIPPED | OUTPATIENT
Start: 2025-05-12

## 2025-05-13 ENCOUNTER — OFFICE VISIT (OUTPATIENT)
Dept: PHYSICAL THERAPY | Facility: REHABILITATION | Age: 40
End: 2025-05-13
Payer: COMMERCIAL

## 2025-05-13 DIAGNOSIS — R39.198 DIFFICULTY URINATING: Primary | ICD-10-CM

## 2025-05-13 DIAGNOSIS — R39.15 URINARY URGENCY: ICD-10-CM

## 2025-05-13 DIAGNOSIS — M62.89 PELVIC FLOOR TENSION: ICD-10-CM

## 2025-05-13 LAB
CFTR FULL MUT ANL BLD/T SEQ: NORMAL
CITATION REF LAB TEST: NORMAL
CLINICAL INFO: NORMAL
ETHNIC BACKGROUND STATED: NORMAL
GENE DIS ANL CARRIER INTERP-IMP: NORMAL
INDICATION: NORMAL
LAB DIRECTOR NAME PROVIDER: NORMAL
RECOMMENDATION PATIENT DOC-IMP: NORMAL
REF LAB TEST METHOD: NORMAL
SERVICE CMNT-IMP: NORMAL
SPECIMEN SOURCE: NORMAL

## 2025-05-13 PROCEDURE — 97110 THERAPEUTIC EXERCISES: CPT | Performed by: PHYSICAL THERAPIST

## 2025-05-13 PROCEDURE — 97140 MANUAL THERAPY 1/> REGIONS: CPT | Performed by: PHYSICAL THERAPIST

## 2025-05-13 PROCEDURE — 97530 THERAPEUTIC ACTIVITIES: CPT | Performed by: PHYSICAL THERAPIST

## 2025-05-13 NOTE — PROGRESS NOTES
Daily Note     Today's date: 2025  Patient name: Priya Johnston  : 1985  MRN: [id number]  Referring provider: Shavon Hall, *  Dx:   Encounter Diagnosis     ICD-10-CM    1. Difficulty urinating  R39.198       2. Urinary urgency  R39.15       3. Pelvic floor tension  M62.89                      Subjective: Nausea and dizziness have persisted despite fact that she is in 2nd trimester. She has to  2 medications at pharmacy today that OB prescribed for nausea. SHIELA has started with coughing from allergies. Continues with urinary urgency and is emptying bladder q 2 hours at work.       Objective: See treatment diary below      Assessment: Tolerated treatment well. Patient would benefit from continued PT. We discussed urge suppression and handout provided, also encouraged to attempt to stretch voiding intervals to every 2 1/2 hours. MT reveals less tension along periurethral tissue which both patient and therapist could appreciate. Encouraged to begin doing PFM exercises while focusing on both contraction and relaxation twice daily x 10 reps due to SHIELA.       Plan: Continue per plan of care.      Precautions:   Patient Active Problem List   Diagnosis    Migraine with aura and without status migrainosus, not intractable    History of pre-eclampsia           POC Expires Auth Status Start Date Exp Date PT Visit Limit DA expires DA provider   8/5               Weeks/Days Gestation  14 15         Blood Pressure  118/78 L arm seated BF ex: R arm seated 118/76; After ex: 118/78         Date of Service 4/15 5/6 5/13         Visits Used            Visits Remaining                        Manuals            Gentle PFM release with cueing for release with breath  15' 20' edwardo periurethral          Hip stretches all planes  10' 10'                                 Neuro Re-Ed            DB + PFM                                                                                     Ther Ex             Aerobic warmup  TM 2.9mph x8' TM x 2.9 x8'         Physioball CW/CCW circes, hip tilts  BPB 1' each BPB x1' each         Side lunge add stretch  Slider x10 each Side and reverse lunges x 15 each         Cat/cow  x15                                  Ther Activity            Education Anatomy and POC   Urge suppresion, scheduled voiding                                                                     Modalities

## 2025-05-19 NOTE — PROGRESS NOTES
Daily Note     Today's date: 2025  Patient name: Priya Johnston  : 1985  MRN: [id number]  Referring provider: Shavon Hall, *  Dx:   Encounter Diagnosis     ICD-10-CM    1. Difficulty urinating  R39.198       2. Urinary urgency  R39.15       3. Pelvic floor tension  M62.89                      Subjective: Feeling better regarding nausea. She has implemented 2 1/2 hour voiding intervals as requested and this is achievable for the most part. Feels as though bladder urgency is there 75% of the time, not 100%. No ill effects from MT after last session. Began noticing fetal movements this week.       Objective: See treatment diary below      Assessment: Tolerated treatment well. Patient would benefit from continued PT. Slightly decreased session due to traffic. Did well with TE based session the entire visit. Good form throughout. Challenged with quadruped bear exercise with PFM focus. Requested to perform new exercises at home this week. Doing well.     Plan: Continue per plan of care.      Precautions:   Patient Active Problem List   Diagnosis    Migraine with aura and without status migrainosus, not intractable    History of pre-eclampsia           POC Expires Auth Status Start Date Exp Date PT Visit Limit DA expires DA provider   8/5               Weeks/Days Gestation  14 15 16        Blood Pressure  118/78 L arm seated BF ex: R arm seated 118/76; After ex: 118/78 BE: 118/82  AE: 124/86  5' later: 122/70         Date of Service 4/15 5/6 5/13 5/20        Visits Used            Visits Remaining                        Manuals            Gentle PFM release with cueing for relaxation with breath  15' 20' edwardo periurethral          Hip stretches all planes  10' 10'                                 Neuro Re-Ed            DB + PFM                                                                                     Ther Ex            Aerobic warmup  TM 2.9mph x8' TM x 2.9 x8' TM x 5'        "  Physioball CW/CCW circes, hip tilts  BPB 1' each BPB x1' each BPB x1' each        Side lunge add stretch  Slider x10 each Side and reverse lunges x 15 each Side and reverse lunges x 15 each        Bear w/PFM    10x5\"        Cat/cow  x15  10        Bridge with YCO abduction    10 w/ focus on relaxation        Clams w/ YCO    X12 each        Ther Activity            Education Anatomy and POC   Urge suppresion, scheduled voiding                                                                     Modalities                                           "

## 2025-05-20 ENCOUNTER — OFFICE VISIT (OUTPATIENT)
Dept: PHYSICAL THERAPY | Facility: REHABILITATION | Age: 40
End: 2025-05-20
Payer: COMMERCIAL

## 2025-05-20 DIAGNOSIS — M62.89 PELVIC FLOOR TENSION: ICD-10-CM

## 2025-05-20 DIAGNOSIS — R39.15 URINARY URGENCY: ICD-10-CM

## 2025-05-20 DIAGNOSIS — R39.198 DIFFICULTY URINATING: Primary | ICD-10-CM

## 2025-05-20 PROCEDURE — 97110 THERAPEUTIC EXERCISES: CPT | Performed by: PHYSICAL THERAPIST

## 2025-05-23 ENCOUNTER — TELEPHONE (OUTPATIENT)
Dept: OBGYN CLINIC | Facility: CLINIC | Age: 40
End: 2025-05-23

## 2025-05-23 NOTE — TELEPHONE ENCOUNTER
Patient was called for 2nd  trimester follow up.  Left a voicemail.  Patient was reminded that she can message this nurse via My Chart and If  having any symptoms or concerns to Please call [phone].      KWAME Escalante  OB Nurse Navigator

## 2025-05-27 ENCOUNTER — OFFICE VISIT (OUTPATIENT)
Dept: PHYSICAL THERAPY | Facility: REHABILITATION | Age: 40
End: 2025-05-27
Payer: COMMERCIAL

## 2025-05-27 DIAGNOSIS — R39.198 DIFFICULTY URINATING: Primary | ICD-10-CM

## 2025-05-27 DIAGNOSIS — M62.89 PELVIC FLOOR TENSION: ICD-10-CM

## 2025-05-27 DIAGNOSIS — R39.15 URINARY URGENCY: ICD-10-CM

## 2025-05-27 PROCEDURE — 97110 THERAPEUTIC EXERCISES: CPT

## 2025-05-27 PROCEDURE — 97140 MANUAL THERAPY 1/> REGIONS: CPT

## 2025-05-27 NOTE — PROGRESS NOTES
"Daily Note     Today's date: 2025  Patient name: Priya Johnston  : 1985  MRN: [id number]  Referring provider: Shavon Hall, *  Dx:   Encounter Diagnosis     ICD-10-CM    1. Difficulty urinating  R39.198       2. Pelvic floor tension  M62.89       3. Urinary urgency  R39.15           Start Time: 0930  Stop Time: 1015  Total time in clinic (min): 45 minutes    Subjective: Pt is doing well, still has symptoms.      Objective: See treatment diary below      Assessment: Tolerated treatment well. Patient would benefit from continued PT. Focussed on basis exercises on mat with focus on relaxation post contraction in addition to gentle stretching of b/l hips & MFR of adductors.      Plan: Continue per plan of care.      Precautions:   Patient Active Problem List   Diagnosis    Migraine with aura and without status migrainosus, not intractable    History of pre-eclampsia           POC Expires Auth Status Start Date Exp Date PT Visit Limit DA expires DA provider   8/5               Weeks/Days Gestation  14 15 16 17       Blood Pressure  118/78 L arm seated BF ex: R arm seated 118/76; After ex: 118/78 BE: 118/82  AE: 124/86  5' later: 122/70         Date of Service 4/15 5/6 5/13 5/20 5/27       Visits Used            Visits Remaining                        Manuals            Gentle PFM release with cueing for relaxation with breath  15' 20' edwardo periurethral          Hip stretches all planes  10' 10'  15' including MFR of adductors                               Neuro Re-Ed            DB + PFM                                                                                     Ther Ex            Aerobic warmup  TM 2.9mph x8' TM x 2.9 x8' TM x 5'         Physioball CW/CCW circes, hip tilts  BPB 1' each BPB x1' each BPB x1' each        Side lunge add stretch  Slider x10 each Side and reverse lunges x 15 each Side and reverse lunges x 15 each        Bear w/PFM    10x5\"        Cat/cow  x15  10 10x     "   Q-ped     10x       Q-ped hip hinge     10x       Bridge with YCO abduction    10 w/ focus on relaxation 2x10       Single leg bridge     On wall 10x       Clams w/ YCO    X12 each 2x10       Ther Activity            Education Anatomy and POC   Urge suppresion, scheduled voiding                                                                     Modalities

## 2025-05-30 ENCOUNTER — TELEPHONE (OUTPATIENT)
Dept: OBGYN CLINIC | Facility: CLINIC | Age: 40
End: 2025-05-30

## 2025-05-30 ENCOUNTER — ROUTINE PRENATAL (OUTPATIENT)
Dept: OBGYN CLINIC | Facility: CLINIC | Age: 40
End: 2025-05-30

## 2025-05-30 VITALS — BODY MASS INDEX: 29.98 KG/M2 | SYSTOLIC BLOOD PRESSURE: 114 MMHG | DIASTOLIC BLOOD PRESSURE: 60 MMHG | WEIGHT: 203 LBS

## 2025-05-30 DIAGNOSIS — O26.899 URINARY URGENCY DURING PREGNANCY: Primary | ICD-10-CM

## 2025-05-30 DIAGNOSIS — Z87.59 HISTORY OF PRE-ECLAMPSIA: ICD-10-CM

## 2025-05-30 DIAGNOSIS — R39.15 URINARY URGENCY DURING PREGNANCY: Primary | ICD-10-CM

## 2025-05-30 DIAGNOSIS — Z3A.17 17 WEEKS GESTATION OF PREGNANCY: ICD-10-CM

## 2025-05-30 PROCEDURE — PNV: Performed by: OBSTETRICS & GYNECOLOGY

## 2025-05-30 NOTE — PROGRESS NOTES
39 y.o.   female at 17.4 wga for PNV. BP : 114/60. TWG: 3  + FM, - LOF, - Ctxn, - bleeding  Feeling well.  Improved urinary symptoms but still feeling that she doesn't completely empty  Still having nausea - recommend starting pepcid as she may be experiencing reflux  Reminded pt to do AFP  Level 2 US scheduled  F/u 4 weeks

## 2025-05-30 NOTE — TELEPHONE ENCOUNTER
Patient was called for 2nd  trimester follow up.  Left a voicemail.  Patient was reminded that she can message this nurse via My Chart and If  having any symptoms or concerns to Please call [phone].      KWAME Escalante  OB Nurse Navigator

## 2025-06-03 ENCOUNTER — OFFICE VISIT (OUTPATIENT)
Dept: PHYSICAL THERAPY | Facility: REHABILITATION | Age: 40
End: 2025-06-03
Payer: COMMERCIAL

## 2025-06-03 DIAGNOSIS — M62.89 PELVIC FLOOR TENSION: ICD-10-CM

## 2025-06-03 DIAGNOSIS — R39.198 DIFFICULTY URINATING: Primary | ICD-10-CM

## 2025-06-03 DIAGNOSIS — R39.15 URINARY URGENCY: ICD-10-CM

## 2025-06-03 PROCEDURE — 97110 THERAPEUTIC EXERCISES: CPT

## 2025-06-03 PROCEDURE — 97140 MANUAL THERAPY 1/> REGIONS: CPT

## 2025-06-03 NOTE — PROGRESS NOTES
Daily Note     Today's date: 6/3/2025  Patient name: Priya Johnston  : 1985  MRN: [id number]  Referring provider: Shavon Hall, *  Dx:   Encounter Diagnosis     ICD-10-CM    1. Difficulty urinating  R39.198       2. Pelvic floor tension  M62.89       3. Urinary urgency  R39.15           Start Time: 09  Stop Time: 1015  Total time in clinic (min): 45 minutes    Subjective: Pt is doing ok, notes she might be feeling little bit of a regression with her symptoms. Continues to have issues with empty her bladder completley. Notes she does have leakage when she coughs really hard, she does use do a Kegel before and it helps sometimes. Patient demonstrated good technique during exercises and would benefit from ongoing PT.       Objective: See treatment diary below      Assessment: Tolerated treatment well. Continued with gentle stretching to b/l hips, mobility and and proximal hip strengthening to address pfm impairments and improve symptoms. Emphasized relaxation in order to decrease pelvic tension. Continued MFR to adductors to release tissue restrictions- TTP noted to proximal b/l adductors today.         Plan: Continue per plan of care.      Precautions:   Patient Active Problem List   Diagnosis    Migraine with aura and without status migrainosus, not intractable    History of pre-eclampsia           POC Expires Auth Status Start Date Exp Date PT Visit Limit DA expires DA provider   8/5               Weeks/Days Gestation  14 15 16 17 18      Blood Pressure  118/78 L arm seated BF ex: R arm seated 118/76; After ex: 118/78 BE: 118/82  AE: 124/86  5' later: 122/70   BE:  118/70  R arm      Date of Service 4/15 5/6 5/13 5/20 5/27 6/3      Visits Used            Visits Remaining                        Manuals            Gentle PFM release with cueing for relaxation with breath  15' 20' edwardo periurethral          Hip stretches all planes  10' 10'  15' including MFR of adductors 15' including MFR of  "adductors                              Neuro Re-Ed            DB + PFM                                                                                     Ther Ex            Aerobic warmup  TM 2.9mph x8' TM x 2.9 x8' TM x 5'         Physioball CW/CCW circes, hip tilts  BPB 1' each BPB x1' each BPB x1' each        Side lunge add stretch  Slider x10 each Side and reverse lunges x 15 each Side and reverse lunges x 15 each        Bear w/PFM    10x5\"        Cat/cow  x15  10 10x 10x      Q-ped     10x 10x      Q-ped hip hinge     10x 10x      Bridge with YCO abduction    10 w/ focus on relaxation 2x10 2x10       Single leg bridge     On wall 10x On wall 15x      Clams w/ YCO    X12 each 2x10 2x10      Ther Activity            Education Anatomy and POC   Urge suppresion, scheduled voiding                                                                     Modalities                                             "

## 2025-06-17 PROBLEM — O09.513 AMA (ADVANCED MATERNAL AGE) PRIMIGRAVIDA 35+, THIRD TRIMESTER: Status: ACTIVE | Noted: 2025-06-17

## 2025-06-17 PROBLEM — O09.299 HX OF PREECLAMPSIA, PRIOR PREGNANCY, CURRENTLY PREGNANT: Status: ACTIVE | Noted: 2025-06-17

## 2025-06-18 ENCOUNTER — ROUTINE PRENATAL (OUTPATIENT)
Facility: HOSPITAL | Age: 40
End: 2025-06-18
Payer: COMMERCIAL

## 2025-06-18 ENCOUNTER — ANCILLARY PROCEDURE (OUTPATIENT)
Facility: HOSPITAL | Age: 40
End: 2025-06-18
Attending: OBSTETRICS & GYNECOLOGY
Payer: COMMERCIAL

## 2025-06-18 VITALS
WEIGHT: 204.37 LBS | DIASTOLIC BLOOD PRESSURE: 64 MMHG | HEART RATE: 89 BPM | HEIGHT: 69 IN | BODY MASS INDEX: 30.27 KG/M2 | SYSTOLIC BLOOD PRESSURE: 120 MMHG

## 2025-06-18 DIAGNOSIS — Z3A.20 20 WEEKS GESTATION OF PREGNANCY: ICD-10-CM

## 2025-06-18 DIAGNOSIS — Z3A.20 20 WEEKS GESTATION OF PREGNANCY: Primary | ICD-10-CM

## 2025-06-18 DIAGNOSIS — O09.513 AMA (ADVANCED MATERNAL AGE) PRIMIGRAVIDA 35+, THIRD TRIMESTER: ICD-10-CM

## 2025-06-18 DIAGNOSIS — Z36.3 ENCOUNTER FOR ANTENATAL SCREENING FOR MALFORMATION: ICD-10-CM

## 2025-06-18 DIAGNOSIS — Z36.86 ENCOUNTER FOR ANTENATAL SCREENING FOR CERVICAL LENGTH: ICD-10-CM

## 2025-06-18 DIAGNOSIS — O09.299 HX OF PREECLAMPSIA, PRIOR PREGNANCY, CURRENTLY PREGNANT: ICD-10-CM

## 2025-06-18 PROCEDURE — 76811 OB US DETAILED SNGL FETUS: CPT | Performed by: OBSTETRICS & GYNECOLOGY

## 2025-06-18 PROCEDURE — 76817 TRANSVAGINAL US OBSTETRIC: CPT | Performed by: OBSTETRICS & GYNECOLOGY

## 2025-06-18 PROCEDURE — 99213 OFFICE O/P EST LOW 20 MIN: CPT | Performed by: OBSTETRICS & GYNECOLOGY

## 2025-06-18 NOTE — ASSESSMENT & PLAN NOTE
Growth scan advised 28 weeks to also complete the missed anatomy  Low dose aspirin 162 mg daily is advised until 36 weeks

## 2025-06-18 NOTE — ASSESSMENT & PLAN NOTE
"She had a low risk aneuploidy screening with NIPT that was reviewed prior to today's visit.     An MSAFP screen was not completed prior to today's visit    We reviewed the reassuring anatomic survey and cervical length today. See today's [facility] US report under \"imaging\" tab. Although encouraging, even a normal-appearing ultrasound cannot exclude all malformations, or the possibility of a genetic syndrome.         "

## 2025-06-18 NOTE — PROGRESS NOTES
"[facility] return visit      HPI: Priya Johnston is a 39 y.o.  at 20w2d who presents today for anatomic survey and cervical length screening ultrasound at our [facility]   See ultrasound report under \"imaging\" tab.         She denies any complaints today.    NIPS was low risk for aneuploidy and was reviewed prior to today's visit.     Vitals:    25 1100   BP: 120/64   Pulse: 89     Problem List Items Addressed This Visit       20 weeks gestation of pregnancy - Primary    She had a low risk aneuploidy screening with NIPT that was reviewed prior to today's visit.     An MSAFP screen was not completed prior to today's visit    We reviewed the reassuring anatomic survey and cervical length today. See today's [facility] US report under \"imaging\" tab. Although encouraging, even a normal-appearing ultrasound cannot exclude all malformations, or the possibility of a genetic syndrome.                AMA (advanced maternal age) primigravida 35+, third trimester    Growth scan advised 32 weeks         Hx of preeclampsia, prior pregnancy, currently pregnant    Growth scan advised 32 weeks  Low dose aspirin 162 mg daily is advised until 36 weeks          Other Visit Diagnoses         Encounter for  screening for malformation          Encounter for  screening for cervical length                Summary of recommended follow up:  Growth at 28 weeks and review of missed anatomy      Please see today's [facility] ultrasound report documented separately under \"imaging\" tab in Epic.    Please don't hesitate to contact our office with any concerns or questions.    -Georgette Veliz MD           "

## 2025-06-23 ENCOUNTER — OFFICE VISIT (OUTPATIENT)
Dept: PHYSICAL THERAPY | Facility: REHABILITATION | Age: 40
End: 2025-06-23
Payer: COMMERCIAL

## 2025-06-23 DIAGNOSIS — R39.198 DIFFICULTY URINATING: Primary | ICD-10-CM

## 2025-06-23 DIAGNOSIS — M62.89 PELVIC FLOOR TENSION: ICD-10-CM

## 2025-06-23 DIAGNOSIS — R39.15 URINARY URGENCY: ICD-10-CM

## 2025-06-23 PROCEDURE — 97140 MANUAL THERAPY 1/> REGIONS: CPT | Performed by: PHYSICAL THERAPIST

## 2025-06-23 PROCEDURE — 97110 THERAPEUTIC EXERCISES: CPT | Performed by: PHYSICAL THERAPIST

## 2025-06-23 NOTE — PROGRESS NOTES
Daily Note     Today's date: 2025  Patient name: Priya Johnston  : 1985  MRN: [id number]  Referring provider: Shavon Hall, *  Dx:   Encounter Diagnosis     ICD-10-CM    1. Difficulty urinating  R39.198       2. Pelvic floor tension  M62.89       3. Urinary urgency  R39.15                        Subjective:     Has been feeling some pain and tingling into R thigh down to foot along L2-L4 dermatomes. Has been occurring ~ 1 week. Discomfort prevents her from doing exercises, especially clamshells. Also notes suprapubic pressure. She had 20 week appt with MFM and US was reassuring.       Objective: See treatment diary below      Assessment: Tolerated treatment well. Added prone lumbar extension utilizing prone pregnancy pillows with mild reduction of pain and was advised to do EIS x5 every time she empties her bladder. She also was advised to do gentle HS partner stretches with . PFM assessment this visit as follows:  Pelvic floor strength:  Power: 2+/5  Endurance: 5 seconds  Reps @ 4 seconds: 5  Fast holds in 10 seconds: 10  Mild tension noted along L LA that reduced with MT  No TTP, sensation intact, no pain reported.           Plan: Continue per plan of care.      Precautions:   Patient Active Problem List   Diagnosis    Migraine with aura and without status migrainosus, not intractable    History of pre-eclampsia           POC Expires Auth Status Start Date Exp Date PT Visit Limit DA expires DA provider   8/5               Weeks/Days Gestation  14 15 16 17 18 21W0D     Blood Pressure  118/78 L arm seated BF ex: R arm seated 118/76; After ex: 118/78 BE: 118/82  AE: 124/86  5' later: 122/70   BE:  118/70  R arm BE: 118/68    AE: 116/72     Date of Service 4/15 5/6 5/13 5/20 5/27 6/3 6/23     Visits Used            Visits Remaining                        Manuals            Gentle PFM release with cueing for relaxation with breath  15' 20' edwardo periurethral     20' LG     Hip  "stretches all planes  10' 10'  15' including MFR of adductors 15' including MFR of adductors      P-A mobs       L1-L5 gr II     PPU w/ OP using pregnancy pillows       10x     Neuro Re-Ed            DB + PFM                                                                                     Ther Ex            Aerobic warmup  TM 2.9mph x8' TM x 2.9 x8' TM x 5'    TM 5'     Hamstring stretch active assisted        10x     QP bird-dogs        10x ea     Physioball CW/CCW circes, hip tilts  BPB 1' each BPB x1' each BPB x1' each        Side lunge add stretch  Slider x10 each Side and reverse lunges x 15 each Side and reverse lunges x 15 each        Bear w/PFM    10x5\"        Cat/cow  x15  10 10x 10x      Q-ped     10x 10x      Q-ped hip hinge     10x 10x      Bridge with YCO abduction    10 w/ focus on relaxation 2x10 2x10       Single leg bridge     On wall 10x On wall 15x      Clams w/ YCO    X12 each 2x10 2x10      Ther Activity            Education Anatomy and POC   Urge suppresion, scheduled voiding                                                                     Modalities                                             "

## 2025-06-25 ENCOUNTER — ROUTINE PRENATAL (OUTPATIENT)
Dept: OBGYN CLINIC | Facility: CLINIC | Age: 40
End: 2025-06-25

## 2025-06-25 VITALS — BODY MASS INDEX: 30.27 KG/M2 | DIASTOLIC BLOOD PRESSURE: 72 MMHG | WEIGHT: 205 LBS | SYSTOLIC BLOOD PRESSURE: 110 MMHG

## 2025-06-25 DIAGNOSIS — Z3A.21 21 WEEKS GESTATION OF PREGNANCY: ICD-10-CM

## 2025-06-25 DIAGNOSIS — O09.522 AMA (ADVANCED MATERNAL AGE) MULTIGRAVIDA 35+, SECOND TRIMESTER: Primary | ICD-10-CM

## 2025-06-25 PROCEDURE — PNV: Performed by: PHYSICIAN ASSISTANT

## 2025-06-25 NOTE — PROGRESS NOTES
The patient is here for a 21 week prenatal visit.     The patient has mild swelling in her feet.     No nausea or vomiting. No headache or dizziness.     Urine dip- protein neg, glucose neg.

## 2025-06-30 NOTE — PROGRESS NOTES
39 y.o.  female at 22w0d (Estimated Date of Delivery: 11/3/25) for PNV.    Pre-Shilo Vitals      Flowsheet Row Most Recent Value   Prenatal Assessment    Movement Present   Prenatal Vitals    Blood Pressure 110/72   Weight - Scale 93 kg (205 lb)   Urine Albumin/Glucose    Dilation/Effacement/Station    Vaginal Drainage    Draining Fluid No   Edema           TW.268 kg (5 lb)    Leakage of fluid: no  Vaginal bleeding: no  Contractions/Cramping: no  Fetal movement: yes  Pt feeling well  20 week US 25    RTO in 4 weeks.

## 2025-07-08 ENCOUNTER — OFFICE VISIT (OUTPATIENT)
Dept: PHYSICAL THERAPY | Facility: REHABILITATION | Age: 40
End: 2025-07-08
Payer: COMMERCIAL

## 2025-07-08 DIAGNOSIS — M62.89 PELVIC FLOOR TENSION: ICD-10-CM

## 2025-07-08 DIAGNOSIS — R39.198 DIFFICULTY URINATING: Primary | ICD-10-CM

## 2025-07-08 DIAGNOSIS — R39.15 URINARY URGENCY: ICD-10-CM

## 2025-07-08 PROCEDURE — 97110 THERAPEUTIC EXERCISES: CPT

## 2025-07-08 PROCEDURE — 97140 MANUAL THERAPY 1/> REGIONS: CPT

## 2025-07-08 NOTE — PROGRESS NOTES
Daily Note     Today's date: 2025  Patient name: Priya Johnston  : 1985  MRN: [id number]  Referring provider: Shavon Hall, *  Dx:   Encounter Diagnosis     ICD-10-CM    1. Difficulty urinating  R39.198       2. Pelvic floor tension  M62.89       3. Urinary urgency  R39.15                          Subjective: Exercises from last session have eliminate sxs into R thigh, but still has intense pelvic and back pain. She does not have any changes with bladder.       Objective: See treatment diary below      Assessment: Tolerated treatment well. Continued with lumbar mobs and PPU due to relief following last session. Pt tolerated treatment well t/o, no report of sxs. Pt will continue to benefit from skilled PT to manage pain and bladder sxs. Pt will be going away on vacation.           Plan: Continue per plan of care.      Precautions:   Patient Active Problem List   Diagnosis    Migraine with aura and without status migrainosus, not intractable    History of pre-eclampsia           POC Expires Auth Status Start Date Exp Date PT Visit Limit DA expires DA provider   8/5               Weeks/Days Gestation  14 15 16 17 18 21W0D 23w1d    Blood Pressure  118/78 L arm seated BF ex: R arm seated 118/76; After ex: 118/78 BE: 118/82  AE: 124/86  5' later: 122/70   BE:  118/70  R arm BE: 118/68    AE: 116/72 BE: 128/80 L arm seated     Date of Service 4/15 5/6 5/13 5/20 5/27 6/3 6/23 7/8    Visits Used            Visits Remaining            Treatment completed by Tory Richard, VILMA with direct supervision and collaboration by Ursula Salas PTA.           x    Manuals            Gentle PFM release with cueing for relaxation with breath  15' 20' edwardo periurethral     20' LG     Hip stretches all planes  10' 10'  15' including MFR of adductors 15' including MFR of adductors  10' IASTM adductors     P-A mobs       L1-L5 gr II L1-L5 gr II SS     PPU w/ OP using pregnancy pillows       10x 10x   "  Neuro Re-Ed            DB + PFM                                                                                     Ther Ex            Aerobic warmup  TM 2.9mph x8' TM x 2.9 x8' TM x 5'    TM 5' TM 5'     Hamstring stretch active assisted        10x     QP bird-dogs        10x ea     Physioball CW/CCW circes, hip tilts  BPB 1' each BPB x1' each BPB x1' each        Side lunge add stretch  Slider x10 each Side and reverse lunges x 15 each Side and reverse lunges x 15 each        Bear w/PFM    10x5\"        Cat/cow  x15  10 10x 10x      Q-ped     10x 10x      Q-ped hip hinge     10x 10x  10x2    Q-ped donkey kick        10x2    Physioball rows, shoulder ext, twists        10x2 ea     Bridge with YCO abduction    10 w/ focus on relaxation 2x10 2x10       Single leg bridge     On wall 10x On wall 15x      Clams w/ YCO    X12 each 2x10 2x10      Ther Activity            Education Anatomy and POC   Urge suppresion, scheduled voiding                                                                     Modalities                                             "

## 2025-07-27 DIAGNOSIS — F41.8 DEPRESSION WITH ANXIETY: ICD-10-CM

## 2025-07-28 ENCOUNTER — ROUTINE PRENATAL (OUTPATIENT)
Dept: OBGYN CLINIC | Facility: CLINIC | Age: 40
End: 2025-07-28

## 2025-07-28 VITALS — BODY MASS INDEX: 30.86 KG/M2 | WEIGHT: 209 LBS | DIASTOLIC BLOOD PRESSURE: 62 MMHG | SYSTOLIC BLOOD PRESSURE: 120 MMHG

## 2025-07-28 DIAGNOSIS — O09.299 HX OF PREECLAMPSIA, PRIOR PREGNANCY, CURRENTLY PREGNANT: Primary | ICD-10-CM

## 2025-07-28 DIAGNOSIS — O09.522 AMA (ADVANCED MATERNAL AGE) MULTIGRAVIDA 35+, SECOND TRIMESTER: ICD-10-CM

## 2025-07-28 DIAGNOSIS — Z36.9 ANTENATAL SCREENING ENCOUNTER: ICD-10-CM

## 2025-07-28 DIAGNOSIS — Z3A.26 26 WEEKS GESTATION OF PREGNANCY: ICD-10-CM

## 2025-07-28 PROCEDURE — PNV: Performed by: PHYSICIAN ASSISTANT

## 2025-07-29 ENCOUNTER — TELEPHONE (OUTPATIENT)
Age: 40
End: 2025-07-29

## 2025-07-29 ENCOUNTER — EVALUATION (OUTPATIENT)
Dept: PHYSICAL THERAPY | Facility: REHABILITATION | Age: 40
End: 2025-07-29
Payer: COMMERCIAL

## 2025-07-29 DIAGNOSIS — R39.15 URINARY URGENCY: ICD-10-CM

## 2025-07-29 DIAGNOSIS — M62.89 PELVIC FLOOR TENSION: ICD-10-CM

## 2025-07-29 DIAGNOSIS — R39.198 DIFFICULTY URINATING: Primary | ICD-10-CM

## 2025-07-29 PROCEDURE — 97140 MANUAL THERAPY 1/> REGIONS: CPT | Performed by: PHYSICAL THERAPIST

## 2025-07-29 PROCEDURE — 97164 PT RE-EVAL EST PLAN CARE: CPT | Performed by: PHYSICAL THERAPIST

## 2025-07-29 PROCEDURE — 97530 THERAPEUTIC ACTIVITIES: CPT | Performed by: PHYSICAL THERAPIST

## 2025-07-29 PROCEDURE — 97112 NEUROMUSCULAR REEDUCATION: CPT | Performed by: PHYSICAL THERAPIST

## 2025-07-29 PROCEDURE — 97110 THERAPEUTIC EXERCISES: CPT | Performed by: PHYSICAL THERAPIST

## 2025-08-01 ENCOUNTER — APPOINTMENT (OUTPATIENT)
Dept: LAB | Facility: CLINIC | Age: 40
End: 2025-08-01
Payer: COMMERCIAL

## 2025-08-01 DIAGNOSIS — E78.49 FAMILIAL HYPERLIPIDEMIA: ICD-10-CM

## 2025-08-01 DIAGNOSIS — Z3A.26 26 WEEKS GESTATION OF PREGNANCY: ICD-10-CM

## 2025-08-01 DIAGNOSIS — Z3A.13 13 WEEKS GESTATION OF PREGNANCY: ICD-10-CM

## 2025-08-01 DIAGNOSIS — Z36.9 ANTENATAL SCREENING ENCOUNTER: ICD-10-CM

## 2025-08-01 LAB
BASOPHILS # BLD AUTO: 0.04 THOUSANDS/ÂΜL (ref 0–0.1)
BASOPHILS NFR BLD AUTO: 0 % (ref 0–1)
EOSINOPHIL # BLD AUTO: 0.13 THOUSAND/ÂΜL (ref 0–0.61)
EOSINOPHIL NFR BLD AUTO: 1 % (ref 0–6)
ERYTHROCYTE [DISTWIDTH] IN BLOOD BY AUTOMATED COUNT: 14.3 % (ref 11.6–15.1)
GLUCOSE 1H P 50 G GLC PO SERPL-MCNC: 83 MG/DL (ref 70–134)
HCT VFR BLD AUTO: 36.4 % (ref 34.8–46.1)
HGB BLD-MCNC: 11.9 G/DL (ref 11.5–15.4)
IMM GRANULOCYTES # BLD AUTO: 0.21 THOUSAND/UL (ref 0–0.2)
IMM GRANULOCYTES NFR BLD AUTO: 2 % (ref 0–2)
LYMPHOCYTES # BLD AUTO: 1.52 THOUSANDS/ÂΜL (ref 0.6–4.47)
LYMPHOCYTES NFR BLD AUTO: 16 % (ref 14–44)
MCH RBC QN AUTO: 29.8 PG (ref 26.8–34.3)
MCHC RBC AUTO-ENTMCNC: 32.7 G/DL (ref 31.4–37.4)
MCV RBC AUTO: 91 FL (ref 82–98)
MONOCYTES # BLD AUTO: 0.66 THOUSAND/ÂΜL (ref 0.17–1.22)
MONOCYTES NFR BLD AUTO: 7 % (ref 4–12)
NEUTROPHILS # BLD AUTO: 6.8 THOUSANDS/ÂΜL (ref 1.85–7.62)
NEUTS SEG NFR BLD AUTO: 74 % (ref 43–75)
NRBC BLD AUTO-RTO: 0 /100 WBCS
PLATELET # BLD AUTO: 192 THOUSANDS/UL (ref 149–390)
PMV BLD AUTO: 9 FL (ref 8.9–12.7)
RBC # BLD AUTO: 4 MILLION/UL (ref 3.81–5.12)
TREPONEMA PALLIDUM IGG+IGM AB [PRESENCE] IN SERUM OR PLASMA BY IMMUNOASSAY: NORMAL
WBC # BLD AUTO: 9.36 THOUSAND/UL (ref 4.31–10.16)

## 2025-08-01 PROCEDURE — 85025 COMPLETE CBC W/AUTO DIFF WBC: CPT

## 2025-08-01 PROCEDURE — 82105 ALPHA-FETOPROTEIN SERUM: CPT

## 2025-08-01 PROCEDURE — 86780 TREPONEMA PALLIDUM: CPT

## 2025-08-01 PROCEDURE — 82950 GLUCOSE TEST: CPT

## 2025-08-01 PROCEDURE — 36415 COLL VENOUS BLD VENIPUNCTURE: CPT

## 2025-08-06 LAB
2ND TRIMESTER 4 SCREEN SERPL-IMP: NORMAL
AFP ADJ MOM SERPL: NORMAL
AFP INTERP AMN-IMP: NORMAL
AFP INTERP SERPL-IMP: NORMAL
AFP INTERP SERPL-IMP: NORMAL
AFP SERPL-MCNC: 120 NG/ML
AGE AT DELIVERY: 37.9 YR
GA METHOD: NORMAL
GA: 26.6 WEEKS
IDDM PATIENT QL: NO
MULTIPLE PREGNANCY: NO
NEURAL TUBE DEFECT RISK FETUS: NORMAL %

## 2025-08-11 ENCOUNTER — ROUTINE PRENATAL (OUTPATIENT)
Dept: OBGYN CLINIC | Facility: CLINIC | Age: 40
End: 2025-08-11
Payer: COMMERCIAL

## 2025-08-11 PROBLEM — Z3A.28 28 WEEKS GESTATION OF PREGNANCY: Status: ACTIVE | Noted: 2025-04-29

## 2025-08-21 ENCOUNTER — ULTRASOUND (OUTPATIENT)
Facility: HOSPITAL | Age: 40
End: 2025-08-21
Payer: COMMERCIAL

## 2025-08-21 VITALS
DIASTOLIC BLOOD PRESSURE: 76 MMHG | SYSTOLIC BLOOD PRESSURE: 118 MMHG | WEIGHT: 216 LBS | HEIGHT: 69 IN | HEART RATE: 91 BPM | BODY MASS INDEX: 31.99 KG/M2

## 2025-08-21 DIAGNOSIS — O09.299 HX OF PREECLAMPSIA, PRIOR PREGNANCY, CURRENTLY PREGNANT: ICD-10-CM

## 2025-08-21 DIAGNOSIS — Z3A.29 29 WEEKS GESTATION OF PREGNANCY: Primary | ICD-10-CM

## 2025-08-21 DIAGNOSIS — O09.513 AMA (ADVANCED MATERNAL AGE) PRIMIGRAVIDA 35+, THIRD TRIMESTER: ICD-10-CM
